# Patient Record
Sex: FEMALE | Race: WHITE | NOT HISPANIC OR LATINO | Employment: FULL TIME | URBAN - METROPOLITAN AREA
[De-identification: names, ages, dates, MRNs, and addresses within clinical notes are randomized per-mention and may not be internally consistent; named-entity substitution may affect disease eponyms.]

---

## 2017-08-08 ENCOUNTER — TELEPHONE (OUTPATIENT)
Dept: MEDICAL GROUP | Facility: PHYSICIAN GROUP | Age: 37
End: 2017-08-08

## 2017-08-08 NOTE — TELEPHONE ENCOUNTER
NEW PATIENT VISIT PRE-VISIT PLANNING    1.  [employer] Patient is checked in Patient Demographics? YES    2.  Immunizations were updated in [facility] using WebIZ?: Yes       •  Web Iz Recommendations: FLU and VARICELLA (Chicken Pox)     3.  Is this appointment scheduled as a Hospital Follow-Up? No    4.  Patient is due for the following Health Maintenance Topics:   There are no preventive care reminders to display for this patient.    - Patient declines Immunizations: Patient is up to date on all vaccines     5.  Reviewed/Updated the following with patient:       •   Preferred Pharmacy? YES       •   Preferred Lab? YES       •   Medications? YES. Was Abstract Encounter opened and chart updated? YES       •   Social History? YES. Was Abstract Encounter opened and chart updated? YES       •   Family History? YES. Was Abstract Encounter opened and chart updated? YES    6.  Updated Care Team?       •   DME Company (gait device, O2, CPAP, etc.) NO       •   Other Specialists (eye doctor, derm, GYN, cardiology, endo, etc): YES    7.  Patient was NOT informed to arrive 15 min prior to their scheduled appointment and bring in their medication bottles.

## 2017-08-09 ENCOUNTER — OFFICE VISIT (OUTPATIENT)
Dept: MEDICAL GROUP | Facility: PHYSICIAN GROUP | Age: 37
End: 2017-08-09
Payer: COMMERCIAL

## 2017-08-09 VITALS
BODY MASS INDEX: 36.7 KG/M2 | SYSTOLIC BLOOD PRESSURE: 128 MMHG | DIASTOLIC BLOOD PRESSURE: 76 MMHG | HEIGHT: 64 IN | WEIGHT: 214.95 LBS | TEMPERATURE: 97.6 F | OXYGEN SATURATION: 99 % | HEART RATE: 86 BPM

## 2017-08-09 DIAGNOSIS — D64.9 NORMOCYTIC ANEMIA: ICD-10-CM

## 2017-08-09 DIAGNOSIS — D72.829 LEUKOCYTOSIS, UNSPECIFIED TYPE: ICD-10-CM

## 2017-08-09 DIAGNOSIS — L98.9 SKIN LESION: ICD-10-CM

## 2017-08-09 DIAGNOSIS — I10 ESSENTIAL HYPERTENSION: ICD-10-CM

## 2017-08-09 DIAGNOSIS — E66.9 OBESITY (BMI 35.0-39.9 WITHOUT COMORBIDITY): ICD-10-CM

## 2017-08-09 PROCEDURE — 99204 OFFICE O/P NEW MOD 45 MIN: CPT | Performed by: INTERNAL MEDICINE

## 2017-08-09 ASSESSMENT — PATIENT HEALTH QUESTIONNAIRE - PHQ9: CLINICAL INTERPRETATION OF PHQ2 SCORE: 0

## 2017-08-09 NOTE — PROGRESS NOTES
Subjective:   Halle Savage is a 37 y.o. female here today for hypertension, lab work, referral for weight program improvement     Skin lesion  She has a growth, nodule on the back of the left ear, more of the skin rag, brown color. It has been for so many year, not changed. Get irritated sometimes.     Obesity (BMI 35.0-39.9 without comorbidity) ([facility])  Counseling for a small portion, balanced diet, exercising 3-5 times per week.      Normocytic anemia  Review of lab work 06/2016, showed normocytic anemia, post pregnancy. Need to be monitored. She denies extreme fatigue, shortness of breath, dizziness, being pale. She denies hematochezia, melanotic stool or abdominal pain. She still has regular period     Hypertension  Sometimes /80. She has been hypertensive during pregnancy. She used to be on medications during pregnancy. 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 She tries to eat healthy. She does not add salt     Elevated WBC count  From lab work 06/2016. She denies fever, unintentional weight loss, lymphadenopathy.         Current medicines (including changes today)  Current Outpatient Prescriptions   Medication Sig Dispense Refill   • Multiple Vitamins-Minerals (MULTIVITAMIN PO) Take  by mouth.     • Oxycodone-Acetaminophen (PERCOCET-10)  MG Tab Take 1 Tab by mouth every four hours as needed for Severe Pain ((Pain Scale 7-10)). 20 Tab 0   • ibuprofen (MOTRIN) 600 MG Tab Take 1 Tab by mouth every 6 hours as needed (Cramping). 30 Tab 3   • labetalol (NORMODYNE) 200 MG Tab Take 1 Tab by mouth 2 Times a Day. 60 Tab 3   • NIFEdipine SR (ADALAT CC) 60 MG CR tablet Take 1 Tab by mouth every day. 30 Tab 1   • docusate sodium 100 MG Cap Take 100 mg by mouth 2 times a day as needed for Constipation. 60 Cap 3   • Prenatal Vit-Fe Fumarate-FA (PRENATAL ONE DAILY PO) Take  by mouth.     • aspirin EC  (ECOTRIN) 81 MG Tablet Delayed Response Take 81 mg by mouth every day.     • azithromycin (ZITHROMAX) 250 MG Tab z-katalina; U.D. 6 Tab 1     No current facility-administered medications for this visit.     She  has a past medical history of Hypertension.    Current Outpatient Prescriptions   Medication Sig Dispense Refill   • Multiple Vitamins-Minerals (MULTIVITAMIN PO) Take  by mouth.     • Oxycodone-Acetaminophen (PERCOCET-10)  MG Tab Take 1 Tab by mouth every four hours as needed for Severe Pain ((Pain Scale 7-10)). 20 Tab 0   • ibuprofen (MOTRIN) 600 MG Tab Take 1 Tab by mouth every 6 hours as needed (Cramping). 30 Tab 3   • labetalol (NORMODYNE) 200 MG Tab Take 1 Tab by mouth 2 Times a Day. 60 Tab 3   • NIFEdipine SR (ADALAT CC) 60 MG CR tablet Take 1 Tab by mouth every day. 30 Tab 1   • docusate sodium 100 MG Cap Take 100 mg by mouth 2 times a day as needed for Constipation. 60 Cap 3   • Prenatal Vit-Fe Fumarate-FA (PRENATAL ONE DAILY PO) Take  by mouth.     • aspirin EC (ECOTRIN) 81 MG Tablet Delayed Response Take 81 mg by mouth every day.     • azithromycin (ZITHROMAX) 250 MG Tab z-katalina; U.D. 6 Tab 1     No current facility-administered medications for this visit.       Allergies as of 08/09/2017 - Pete as Reviewed 08/09/2017   Allergen Reaction Noted   • Augmentin Hives 07/13/2014       Social History     Social History   • Marital Status: Single     Spouse Name: N/A   • Number of Children: N/A   • Years of Education: N/A     Occupational History   • Not on file.     Social History Main Topics   • Smoking status: Never Smoker    • Smokeless tobacco: Never Used   • Alcohol Use: No   • Drug Use: No   • Sexual Activity: Yes      Comment: 3 boys     Other Topics Concern   • Not on file     Social History Narrative        Family History   Problem Relation Age of Onset   • Hypertension Mother    • Hyperlipidemia Mother    • Hypertension Father    • Hyperlipidemia Father    • Alcohol/Drug Father    • Cancer  "Father      kidney cancer   • Diabetes Neg Hx        Past Surgical History   Procedure Laterality Date   • Primary c section     • Repeat c section w tubal ligation  6/1/2016     Procedure: REPEAT C SECTION WITH TUBAL LIGATION;  Surgeon: Jana Shepherd M.D.;  Location: LABOR AND DELIVERY;  Service:    • Tonsillectomy     • Eye surgery       lasix   • Lymph node excision       right side of the neck   • Tube & ectopic preg., removal         ROS   All systems reviewed are negative except for HPI       Objective:     Blood pressure 128/76, pulse 86, temperature 36.4 °C (97.6 °F), height 1.613 m (5' 3.5\"), weight 97.5 kg (214 lb 15.2 oz), SpO2 99 %. Body mass index is 37.47 kg/(m^2).   Physical Exam:  Constitutional: Alert, no distress.  Skin: Warm, dry, good turgor, no rashes in visible areas.  Eye: Equal, round and reactive, conjunctiva clear, lids normal.  ENMT: Lips without lesions, good dentition, oropharynx clear.  Neck: Trachea midline, no masses, no thyromegaly. No cervical or supraclavicular lymphadenopathy  Respiratory: Unlabored respiratory effort, lungs clear to auscultation, no wheezes, no ronchi.  Cardiovascular: Normal S1, S2, no murmur, no edema.  Abdomen: Soft, non-tender, no masses, no hepatosplenomegaly.  Psych: Alert and oriented x3, normal affect and mood.  Muscleskeletal: leg straight test negative, hip range of motion intact. No tender point, no hip swelling or tender point         Assessment and Plan:   The following treatment plan was discussed    1. Obesity (BMI 35.0-39.9 without comorbidity) (HCC)  Counseling for a small portion, balanced diet, exercising 3-5 times per week.  Referral in place for weight loss program. TSH to evalute rule out hypothyroidism.     - REFERRAL TO [facility] HEALTH IMPROVEMENT PROGRAMS (HIP) Services Requested:: General-HIP Staff to Evaluate Best Program, Weight Management Program; Reason for Visit:: Overweight/Obesity  - TSH WITH REFLEX TO FT4; Future    2. " Essential hypertension  Continue to monitor 2-3 times per week, reevaluate in 4 weeks   Lab work to follow  - COMP METABOLIC PANEL; Future  - LIPID PROFILE; Future    3. Leukocytosis, unspecified type  Reevaluate, likely reactive at that time   - CBC WITH DIFFERENTIAL; Future    4. Normocytic anemia  Due to pregnancy. Continue to monitor   - CBC WITH DIFFERENTIAL; Future    5. Skin lesion  Skin tag, referral to derm for resection on monitoring   - REFERRAL TO DERMATOLOGY      Followup: Return in about 4 weeks (around 9/6/2017) for Short.

## 2017-08-09 NOTE — MR AVS SNAPSHOT
"        Halle Savage   2017 9:20 AM   Office Visit   MRN: [id number]    Department:  [facility]   Dept Phone:  [phone]    Description:  Female : 1980   Provider:  Slick Whitten M.D.           Reason for Visit     Hypertension High bp after pregnancy/would like to re-check       Allergies as of 2017     Allergen Noted Reactions    Augmentin 2014   Hives      You were diagnosed with     Obesity (BMI 35.0-39.9 without comorbidity) ([facility])   [630034]       Essential hypertension   [5049847]       Leukocytosis, unspecified type   [7231737]       Normocytic anemia   [683127]       Skin lesion   [524952]         Vital Signs     Blood Pressure Pulse Temperature Height Weight Body Mass Index    128/76 mmHg 86 36.4 °C (97.6 °F) 1.613 m (5' 3.5\") 97.5 kg (214 lb 15.2 oz) 37.47 kg/m2    Oxygen Saturation Smoking Status                99% Never Smoker           Basic Information     Date Of Birth Sex Race Ethnicity Preferred Language    1980 Female White Non- English      Your appointments     Sep 11, 2017  7:00 AM   Established Patient with Slick Whitten M.D.   [facility] (--)    [address]  [address]  [facility] [address] [phone]           You will be receiving a confirmation call a few days before your appointment from our automated call confirmation system.              Problem List              ICD-10-CM Priority Class Noted - Resolved    Hypertension I10   2015 - Present    Elevated WBC count D72.829   2015 - Present    Obesity (BMI 35.0-39.9 without comorbidity) (HCC) E66.9   2017 - Present    Normocytic anemia D64.9   2017 - Present    Skin lesion L98.9   2017 - Present      Health Maintenance        Date Due Completion Dates    IMM INFLUENZA (1) 2017 10/3/2016, 10/5/2015, 10/13/2014, 10/24/2013, 2012, 2011, 10/5/2010, 9/15/2009    PAP SMEAR 2017 (Done)    Override on 2014: Done (Dr. Jauregui per " patient)    IMM DTaP/Tdap/Td Vaccine (5 - Td) 5/31/2026 5/31/2016, 6/10/2011, 9/9/2009, 4/26/1994, 4/4/1984, 7/16/1982, 1980, 1979, 1980            Current Immunizations     DTP 7/16/1982, 1979, 1979, 1980    INFLUENZA VACCINE H1N1 10/22/2009    Influenza TIV (IM) 10/24/2013, 9/29/2012, 9/15/2009    Influenza Vac Subunit Quad Inj (Pf) 10/5/2010    Influenza Vaccine Quad Inj (Pf) 10/3/2016  8:16 AM, 10/5/2015  8:02 AM, 10/13/2014    Influenza Vaccine Quad Inj (Preserved) 9/29/2011    MMR Vaccine 6/10/2011, 9/9/2009, 8/26/1991, 7/16/1982    OPV - Historical Data 4/4/1984, 7/16/1982, 1979, 1979, 1979    TD Vaccine 4/26/1994    Tdap Vaccine 5/31/2016, 6/10/2011, 9/9/2009    Tuberculin Skin Test 5/19/2014 12:10 PM, 9/4/2013 12:20 PM, 9/10/2012 12:00 PM    dT Vaccine 4/4/1984      Below and/or attached are the medications your provider expects you to take. Review all of your home medications and newly ordered medications with your provider and/or pharmacist. Follow medication instructions as directed by your provider and/or pharmacist. Please keep your medication list with you and share with your provider. Update the information when medications are discontinued, doses are changed, or new medications (including over-the-counter products) are added; and carry medication information at all times in the event of emergency situations     Allergies:  AUGMENTIN - Hives               Medications  Valid as of: August 09, 2017 - 10:07 AM    Generic Name Brand Name Tablet Size Instructions for use    Aspirin (Tablet Delayed Response) ECOTRIN 81 MG Take 81 mg by mouth every day.        Azithromycin (Tab) ZITHROMAX 250 MG z-katalina; U.D.        Docusate Sodium (Cap)  MG Take 100 mg by mouth 2 times a day as needed for Constipation.        Ibuprofen (Tab) MOTRIN 600 MG Take 1 Tab by mouth every 6 hours as needed (Cramping).        Labetalol HCl (Tab) NORMODYNE 200 MG Take 1 Tab by  mouth 2 Times a Day.        Multiple Vitamins-Minerals   Take  by mouth.        NIFEdipine (TABLET SR 24 HR) ADALAT CC 60 MG Take 1 Tab by mouth every day.        Oxycodone-Acetaminophen (Tab) PERCOCET-10  MG Take 1 Tab by mouth every four hours as needed for Severe Pain ((Pain Scale 7-10)).        Prenatal Vit-Fe Fumarate-FA   Take  by mouth.        .                 Medicines prescribed today were sent to:     [facility] [address] [facility] [place] - [address] AT [facility] [address]    [address] [place] [address]    Phone: [phone] Fax: [phone]    Open 24 Hours?: No      Medication refill instructions:       If your prescription bottle indicates you have medication refills left, it is not necessary to call your provider’s office. Please contact your pharmacy and they will refill your medication.    If your prescription bottle indicates you do not have any refills left, you may request refills at any time through one of the following ways: The online "[employer] system (except Urgent Care), by calling your provider’s office, or by asking your pharmacy to contact your provider’s office with a refill request. Medication refills are processed only during regular business hours and may not be available until the next business day. Your provider may request additional information or to have a follow-up visit with you prior to refilling your medication.   *Please Note: Medication refills are assigned a new Rx number when refilled electronically. Your pharmacy may indicate that no refills were authorized even though a new prescription for the same medication is available at the pharmacy. Please request the medicine by name with the pharmacy before contacting your provider for a refill.        Your To Do List     Future Labs/Procedures Complete By Expires    CBC WITH DIFFERENTIAL  As directed 8/10/2018    COMP METABOLIC PANEL  As directed 8/10/2018    LIPID PROFILE  As directed  8/10/2018    TSH WITH REFLEX TO FT4  As directed 8/9/2018      Referral     A referral request has been sent to our patient care coordination department. Please allow 3-5 business days for us to process this request and contact you either by phone or mail. If you do not hear from us by the 5th business day, please call us at [phone].           [employer] Access Code: Activation code not generated  Current [employer] Status: Active

## 2017-08-09 NOTE — ASSESSMENT & PLAN NOTE
Review of lab work 06/2016, showed normocytic anemia, post pregnancy. Need to be monitored. She denies extreme fatigue, shortness of breath, dizziness, being pale. She denies hematochezia, melanotic stool or abdominal pain. She still has regular period

## 2017-08-09 NOTE — ASSESSMENT & PLAN NOTE
She has a growth, nodule on the back of the left ear, more of the skin rag, brown color. It has been for so many year, not changed. Get irritated sometimes.

## 2017-08-09 NOTE — ASSESSMENT & PLAN NOTE
Sometimes /80. She has been hypertensive during pregnancy. She used to be on medications during pregnancy. 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 She tries to eat healthy. She does not add salt

## 2017-08-24 ENCOUNTER — HOSPITAL ENCOUNTER (OUTPATIENT)
Dept: LAB | Facility: MEDICAL CENTER | Age: 37
End: 2017-08-24
Attending: INTERNAL MEDICINE
Payer: COMMERCIAL

## 2017-08-24 DIAGNOSIS — D64.9 NORMOCYTIC ANEMIA: ICD-10-CM

## 2017-08-24 DIAGNOSIS — D72.829 LEUKOCYTOSIS, UNSPECIFIED TYPE: ICD-10-CM

## 2017-08-24 DIAGNOSIS — E66.9 OBESITY (BMI 35.0-39.9 WITHOUT COMORBIDITY): ICD-10-CM

## 2017-08-24 DIAGNOSIS — I10 ESSENTIAL HYPERTENSION: ICD-10-CM

## 2017-08-24 LAB
ALBUMIN SERPL BCP-MCNC: 3.8 G/DL (ref 3.2–4.9)
ALBUMIN/GLOB SERPL: 1.1 G/DL
ALP SERPL-CCNC: 74 U/L (ref 30–99)
ALT SERPL-CCNC: 5 U/L (ref 2–50)
ANION GAP SERPL CALC-SCNC: 7 MMOL/L (ref 0–11.9)
AST SERPL-CCNC: 9 U/L (ref 12–45)
BASOPHILS # BLD AUTO: 1 % (ref 0–1.8)
BASOPHILS # BLD: 0.1 K/UL (ref 0–0.12)
BILIRUB SERPL-MCNC: 0.4 MG/DL (ref 0.1–1.5)
BUN SERPL-MCNC: 6 MG/DL (ref 8–22)
CALCIUM SERPL-MCNC: 9.2 MG/DL (ref 8.5–10.5)
CHLORIDE SERPL-SCNC: 106 MMOL/L (ref 96–112)
CHOLEST SERPL-MCNC: 169 MG/DL (ref 100–199)
CO2 SERPL-SCNC: 24 MMOL/L (ref 20–33)
CREAT SERPL-MCNC: 0.59 MG/DL (ref 0.5–1.4)
EOSINOPHIL # BLD AUTO: 0.11 K/UL (ref 0–0.51)
EOSINOPHIL NFR BLD: 1.1 % (ref 0–6.9)
ERYTHROCYTE [DISTWIDTH] IN BLOOD BY AUTOMATED COUNT: 44.5 FL (ref 35.9–50)
FASTING STATUS PATIENT QL REPORTED: NORMAL
GFR SERPL CREATININE-BSD FRML MDRD: >60 ML/MIN/1.73 M 2
GLOBULIN SER CALC-MCNC: 3.6 G/DL (ref 1.9–3.5)
GLUCOSE SERPL-MCNC: 79 MG/DL (ref 65–99)
HCT VFR BLD AUTO: 40.6 % (ref 37–47)
HDLC SERPL-MCNC: 40 MG/DL
HGB BLD-MCNC: 12.4 G/DL (ref 12–16)
IMM GRANULOCYTES # BLD AUTO: 0.02 K/UL (ref 0–0.11)
IMM GRANULOCYTES NFR BLD AUTO: 0.2 % (ref 0–0.9)
LDLC SERPL CALC-MCNC: 109 MG/DL
LYMPHOCYTES # BLD AUTO: 2.19 K/UL (ref 1–4.8)
LYMPHOCYTES NFR BLD: 21.9 % (ref 22–41)
MCH RBC QN AUTO: 26.8 PG (ref 27–33)
MCHC RBC AUTO-ENTMCNC: 30.5 G/DL (ref 33.6–35)
MCV RBC AUTO: 87.9 FL (ref 81.4–97.8)
MONOCYTES # BLD AUTO: 0.92 K/UL (ref 0–0.85)
MONOCYTES NFR BLD AUTO: 9.2 % (ref 0–13.4)
NEUTROPHILS # BLD AUTO: 6.68 K/UL (ref 2–7.15)
NEUTROPHILS NFR BLD: 66.6 % (ref 44–72)
NRBC # BLD AUTO: 0 K/UL
NRBC BLD AUTO-RTO: 0 /100 WBC
PLATELET # BLD AUTO: 339 K/UL (ref 164–446)
PMV BLD AUTO: 13.3 FL (ref 9–12.9)
POTASSIUM SERPL-SCNC: 3.9 MMOL/L (ref 3.6–5.5)
PROT SERPL-MCNC: 7.4 G/DL (ref 6–8.2)
RBC # BLD AUTO: 4.62 M/UL (ref 4.2–5.4)
SODIUM SERPL-SCNC: 137 MMOL/L (ref 135–145)
TRIGL SERPL-MCNC: 100 MG/DL (ref 0–149)
TSH SERPL DL<=0.005 MIU/L-ACNC: 1.78 UIU/ML (ref 0.3–3.7)
WBC # BLD AUTO: 10 K/UL (ref 4.8–10.8)

## 2017-08-24 PROCEDURE — 36415 COLL VENOUS BLD VENIPUNCTURE: CPT

## 2017-08-24 PROCEDURE — 84443 ASSAY THYROID STIM HORMONE: CPT

## 2017-08-24 PROCEDURE — 80053 COMPREHEN METABOLIC PANEL: CPT

## 2017-08-24 PROCEDURE — 80061 LIPID PANEL: CPT

## 2017-08-24 PROCEDURE — 85025 COMPLETE CBC W/AUTO DIFF WBC: CPT

## 2017-09-11 ENCOUNTER — OFFICE VISIT (OUTPATIENT)
Dept: MEDICAL GROUP | Facility: PHYSICIAN GROUP | Age: 37
End: 2017-09-11
Payer: COMMERCIAL

## 2017-09-11 VITALS
OXYGEN SATURATION: 97 % | SYSTOLIC BLOOD PRESSURE: 128 MMHG | HEART RATE: 80 BPM | RESPIRATION RATE: 16 BRPM | DIASTOLIC BLOOD PRESSURE: 80 MMHG | TEMPERATURE: 97.6 F | HEIGHT: 64 IN | WEIGHT: 215 LBS | BODY MASS INDEX: 36.7 KG/M2

## 2017-09-11 DIAGNOSIS — Z23 NEED FOR VACCINATION: ICD-10-CM

## 2017-09-11 DIAGNOSIS — E66.9 OBESITY (BMI 35.0-39.9 WITHOUT COMORBIDITY): ICD-10-CM

## 2017-09-11 DIAGNOSIS — I10 ESSENTIAL HYPERTENSION: ICD-10-CM

## 2017-09-11 DIAGNOSIS — L98.9 SKIN LESION: ICD-10-CM

## 2017-09-11 PROBLEM — D64.9 NORMOCYTIC ANEMIA: Status: RESOLVED | Noted: 2017-08-09 | Resolved: 2017-09-11

## 2017-09-11 PROCEDURE — 90686 IIV4 VACC NO PRSV 0.5 ML IM: CPT | Performed by: INTERNAL MEDICINE

## 2017-09-11 PROCEDURE — 90471 IMMUNIZATION ADMIN: CPT | Performed by: INTERNAL MEDICINE

## 2017-09-11 PROCEDURE — 99214 OFFICE O/P EST MOD 30 MIN: CPT | Mod: 25 | Performed by: INTERNAL MEDICINE

## 2017-09-11 RX ORDER — LISINOPRIL 10 MG/1
10 TABLET ORAL DAILY
Qty: 30 TAB | Refills: 3 | Status: SHIPPED | OUTPATIENT
Start: 2017-09-11 | End: 2018-03-08 | Stop reason: SDUPTHER

## 2017-09-11 ASSESSMENT — PAIN SCALES - GENERAL: PAINLEVEL: NO PAIN

## 2017-09-11 NOTE — ASSESSMENT & PLAN NOTE
She tells me most the time BP well controled at home. It is usually around 130/80. 30 % of the time will be 146/85. She denies headache, chest pain, palpitations, leg swelling. 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 She does not use salt in excessive. She has had tubal ligation, no plan for other pregnancy.

## 2017-09-11 NOTE — PROGRESS NOTES
Subjective:   Halle Savage is a 37 y.o. female here today for lab work review, hypertension     Hypertension  She tells me most the time BP well controled at home. It is usually around 130/80. 30 % of the time will be 146/85. She denies headache, chest pain, palpitations, leg swelling. 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 She does not use salt in excessive. She has had tubal ligation, no plan for other pregnancy.     Obesity (BMI 35.0-39.9 without comorbidity) ([facility])  Counseling for a small portion, balanced diet, exercising 3-5 times per week. She is planning to participate at  program for weight loss. Not very active due to job, children. Sometimes does not eat very healthy       Skin lesion  She has upcoming apt with derm, this week.     Lab work review. Anemia resolved, leukocytosis resolved. TSH nl     Current medicines (including changes today)  Current Outpatient Prescriptions   Medication Sig Dispense Refill   • lisinopril (PRINIVIL) 10 MG Tab Take 1 Tab by mouth every day. 30 Tab 3   • Multiple Vitamins-Minerals (MULTIVITAMIN PO) Take  by mouth.       No current facility-administered medications for this visit.      She  has a past medical history of Hypertension.    Current Outpatient Prescriptions   Medication Sig Dispense Refill   • lisinopril (PRINIVIL) 10 MG Tab Take 1 Tab by mouth every day. 30 Tab 3   • Multiple Vitamins-Minerals (MULTIVITAMIN PO) Take  by mouth.       No current facility-administered medications for this visit.        Allergies as of 09/11/2017 - Reviewed 09/11/2017   Allergen Reaction Noted   • Augmentin Hives 07/13/2014       Social History     Social History   • Marital status: Single     Spouse name: N/A   • Number of children: N/A   • Years of education: N/A     Occupational History   • Not on file.     Social History Main Topics   • Smoking status: Never Smoker   • Smokeless tobacco: Never Used   • Alcohol use No   • Drug  "use: No   • Sexual activity: Yes      Comment: 3 boys     Other Topics Concern   • Not on file     Social History Narrative   • No narrative on file        Family History   Problem Relation Age of Onset   • Hypertension Mother    • Hyperlipidemia Mother    • Hypertension Father    • Hyperlipidemia Father    • Alcohol/Drug Father    • Cancer Father      kidney cancer   • Diabetes Neg Hx        Past Surgical History:   Procedure Laterality Date   • REPEAT C SECTION W TUBAL LIGATION  6/1/2016    Procedure: REPEAT C SECTION WITH TUBAL LIGATION;  Surgeon: Jana Shepherd M.D.;  Location: LABOR AND DELIVERY;  Service:    • EYE SURGERY      lasix   • LYMPH NODE EXCISION      right side of the neck   • PRIMARY C SECTION     • TONSILLECTOMY     • TUBE & ECTOPIC PREG., REMOVAL         ROS   All systems reviewed are negative except for HPI         Objective:     Blood pressure 128/80, pulse 80, temperature 36.4 °C (97.6 °F), resp. rate 16, height 1.613 m (5' 3.5\"), weight 97.5 kg (215 lb), last menstrual period 09/01/2017, SpO2 97 %, not currently breastfeeding. Body mass index is 37.48 kg/m².   Physical Exam:  Constitutional: Alert, no distress.  Skin: Warm, dry, good turgor, no rashes in visible areas.  Eye: Equal, round and reactive, conjunctiva clear, lids normal.  ENMT: Lips without lesions, good dentition, oropharynx clear.  Neck: Trachea midline, no masses, no thyromegaly. No cervical or supraclavicular lymphadenopathy  Respiratory: Unlabored respiratory effort, lungs clear to auscultation, no wheezes, no ronchi.  Cardiovascular: Normal S1, S2, no murmur, no edema.  Abdomen: Soft, non-tender, no masses, no hepatosplenomegaly.  Psych: Alert and oriented x3, normal affect and mood.        Assessment and Plan:   The following treatment plan was discussed    1. Essential hypertension  Discuss with pt treatment option. Because of her age, she will benefits for treatment of low dose of lisinopril 10 mg. Discuss with her, " side effects as dry cough, pt agrees with the plan   - lisinopril (PRINIVIL) 10 MG Tab; Take 1 Tab by mouth every day.  Dispense: 30 Tab; Refill: 3  - COMP METABOLIC PANEL; Future  - MICROALBUMIN CREAT RATIO URINE; Future    2. Skin lesion  Follow up with derm     3. Obesity (BMI 35.0-39.9 without comorbidity) (HCC)  Counseling for a small portion, balanced diet, exercising 3-5 times per week.    4. Need for vaccination  Flu shot ordered   - INFLUENZA VACCINE QUAD INJ >3Y(PF)      Followup: Return in about 2 months (around 11/11/2017).

## 2017-09-11 NOTE — ASSESSMENT & PLAN NOTE
Counseling for a small portion, balanced diet, exercising 3-5 times per week. She is planning to participate at  program for weight loss. Not very active due to job, children. Sometimes does not eat very healthy

## 2017-09-27 ENCOUNTER — HOSPITAL ENCOUNTER (OUTPATIENT)
Dept: LAB | Facility: MEDICAL CENTER | Age: 37
End: 2017-09-27
Payer: COMMERCIAL

## 2017-09-27 LAB
BDY FAT % MEASURED: 43.7 %
BP DIAS: 69 MMHG
BP SYS: 115 MMHG
CHOLEST SERPL-MCNC: 172 MG/DL (ref 100–199)
DIABETES HTDIA: NO
EVENT NAME HTEVT: NORMAL
FASTING HTFAS: YES
GLUCOSE SERPL-MCNC: 76 MG/DL (ref 65–99)
HDLC SERPL-MCNC: 41 MG/DL
HYPERTENSION HTHYP: YES
LDLC SERPL CALC-MCNC: 112 MG/DL
SCREENING LOC CITY HTCIT: NORMAL
SCREENING LOC STATE HTSTA: NORMAL
SCREENING LOCATION HTLOC: NORMAL
SMOKING HTSMO: NO
SUBSCRIBER ID HTSID: NORMAL
TRIGL SERPL-MCNC: 94 MG/DL (ref 0–149)

## 2017-09-27 PROCEDURE — 82947 ASSAY GLUCOSE BLOOD QUANT: CPT

## 2017-09-27 PROCEDURE — S5190 WELLNESS ASSESSMENT BY NONPH: HCPCS

## 2017-09-27 PROCEDURE — 80061 LIPID PANEL: CPT

## 2017-09-27 PROCEDURE — 36415 COLL VENOUS BLD VENIPUNCTURE: CPT

## 2017-09-28 ENCOUNTER — EH NON-PROVIDER (OUTPATIENT)
Dept: OCCUPATIONAL MEDICINE | Facility: CLINIC | Age: 37
End: 2017-09-28

## 2017-09-28 ENCOUNTER — TELEPHONE (OUTPATIENT)
Dept: MEDICAL GROUP | Facility: PHYSICIAN GROUP | Age: 37
End: 2017-09-28

## 2017-09-28 DIAGNOSIS — Z29.89 NEED FOR ISOLATION: ICD-10-CM

## 2017-09-28 PROCEDURE — 94375 RESPIRATORY FLOW VOLUME LOOP: CPT

## 2017-09-28 NOTE — TELEPHONE ENCOUNTER
----- Message from Slick Whitten M.D. sent at 9/28/2017  7:28 AM PDT -----  Hello,  Blood pressure, fasting glucose are normal  Cholesterol is slight elevated, but not bad. Try to avoid fat food, and try to exercise 3-5 times per week  Best,  Slick Whitten M.D.

## 2017-11-11 ENCOUNTER — HOSPITAL ENCOUNTER (OUTPATIENT)
Dept: LAB | Facility: MEDICAL CENTER | Age: 37
End: 2017-11-11
Attending: INTERNAL MEDICINE
Payer: COMMERCIAL

## 2017-11-11 DIAGNOSIS — I10 ESSENTIAL HYPERTENSION: ICD-10-CM

## 2017-11-11 LAB
ALBUMIN SERPL BCP-MCNC: 4 G/DL (ref 3.2–4.9)
ALBUMIN/GLOB SERPL: 1.1 G/DL
ALP SERPL-CCNC: 60 U/L (ref 30–99)
ALT SERPL-CCNC: 7 U/L (ref 2–50)
ANION GAP SERPL CALC-SCNC: 6 MMOL/L (ref 0–11.9)
AST SERPL-CCNC: 12 U/L (ref 12–45)
BILIRUB SERPL-MCNC: 0.4 MG/DL (ref 0.1–1.5)
BUN SERPL-MCNC: 10 MG/DL (ref 8–22)
CALCIUM SERPL-MCNC: 9.2 MG/DL (ref 8.5–10.5)
CHLORIDE SERPL-SCNC: 104 MMOL/L (ref 96–112)
CO2 SERPL-SCNC: 24 MMOL/L (ref 20–33)
CREAT SERPL-MCNC: 0.68 MG/DL (ref 0.5–1.4)
CREAT UR-MCNC: 147.5 MG/DL
GFR SERPL CREATININE-BSD FRML MDRD: >60 ML/MIN/1.73 M 2
GLOBULIN SER CALC-MCNC: 3.6 G/DL (ref 1.9–3.5)
GLUCOSE SERPL-MCNC: 79 MG/DL (ref 65–99)
MICROALBUMIN UR-MCNC: <0.7 MG/DL
MICROALBUMIN/CREAT UR: NORMAL MG/G (ref 0–30)
POTASSIUM SERPL-SCNC: 4.1 MMOL/L (ref 3.6–5.5)
PROT SERPL-MCNC: 7.6 G/DL (ref 6–8.2)
SODIUM SERPL-SCNC: 134 MMOL/L (ref 135–145)

## 2017-11-11 PROCEDURE — 82043 UR ALBUMIN QUANTITATIVE: CPT

## 2017-11-11 PROCEDURE — 36415 COLL VENOUS BLD VENIPUNCTURE: CPT

## 2017-11-11 PROCEDURE — 80053 COMPREHEN METABOLIC PANEL: CPT

## 2017-11-11 PROCEDURE — 82570 ASSAY OF URINE CREATININE: CPT

## 2017-11-14 ENCOUNTER — OFFICE VISIT (OUTPATIENT)
Dept: MEDICAL GROUP | Facility: PHYSICIAN GROUP | Age: 37
End: 2017-11-14
Payer: COMMERCIAL

## 2017-11-14 VITALS
BODY MASS INDEX: 34.83 KG/M2 | SYSTOLIC BLOOD PRESSURE: 124 MMHG | TEMPERATURE: 98.5 F | WEIGHT: 204 LBS | RESPIRATION RATE: 16 BRPM | HEART RATE: 75 BPM | DIASTOLIC BLOOD PRESSURE: 76 MMHG | OXYGEN SATURATION: 99 % | HEIGHT: 64 IN

## 2017-11-14 DIAGNOSIS — N94.3 PREMENSTRUAL SYNDROME: ICD-10-CM

## 2017-11-14 DIAGNOSIS — I10 ESSENTIAL HYPERTENSION: ICD-10-CM

## 2017-11-14 DIAGNOSIS — E66.9 OBESITY (BMI 35.0-39.9 WITHOUT COMORBIDITY): ICD-10-CM

## 2017-11-14 DIAGNOSIS — L98.9 SKIN LESION: ICD-10-CM

## 2017-11-14 DIAGNOSIS — N64.4 BREAST TENDERNESS: ICD-10-CM

## 2017-11-14 PROCEDURE — 99214 OFFICE O/P EST MOD 30 MIN: CPT | Performed by: INTERNAL MEDICINE

## 2017-11-14 RX ORDER — DROSPIRENONE AND ETHINYL ESTRADIOL 0.03MG-3MG
1 KIT ORAL DAILY
Qty: 28 TAB | Refills: 11 | Status: SHIPPED | OUTPATIENT
Start: 2017-11-14 | End: 2018-11-21

## 2017-11-14 NOTE — ASSESSMENT & PLAN NOTE
Since she's been on lisinopril 10 mg daily, blood pressure is better controlled. She denies headache, chest pain, palpitation, lightheadedness,, leg swelling. She denies side effects from lisinopril

## 2017-11-14 NOTE — ASSESSMENT & PLAN NOTE
She has lost 10 lbs in two months. She continued healthy, exercise. Blood pressure better controlled.Counseling for a small portion, balanced diet, exercising 3-5 times per week.

## 2017-11-14 NOTE — ASSESSMENT & PLAN NOTE
She reports bilateral breast tenderness before her menstrual cycle. Breast exam done today, and I don't feel nodule, or lymphadenopathy. She reports being nervous before her menstrual cycle, her menstrual cycle is getting heavier. She reports being more emotional lately.

## 2017-11-14 NOTE — PROGRESS NOTES
Subjective:   Halle Savage is a 37 y.o. female here today for breast tenderness, hypertension     Obesity (BMI 35.0-39.9 without comorbidity) (HCC)  She has lost 10 lbs in two months. She continued healthy, exercise. Blood pressure better controlled.Counseling for a small portion, balanced diet, exercising 3-5 times per week.      Hypertension  Since she's been on lisinopril 10 mg daily, blood pressure is better controlled. She denies headache, chest pain, palpitation, lightheadedness,, leg swelling. She denies side effects from lisinopril    Breast tenderness  She reports bilateral breast tenderness before her menstrual cycle. Breast exam done today, and I don't feel nodule, or lymphadenopathy. She reports being nervous before her menstrual cycle, her menstrual cycle is getting heavier. She reports being more emotional lately.     Skin lesion  She has up coming apt with dermatology. No changes     Current medicines (including changes today)  Current Outpatient Prescriptions   Medication Sig Dispense Refill   • drospirenone-ethinyl estradiol (ROSENDA) 3-0.03 MG per tablet Take 1 Tab by mouth every day. 28 Tab 11   • lisinopril (PRINIVIL) 10 MG Tab Take 1 Tab by mouth every day. 30 Tab 3   • Multiple Vitamins-Minerals (MULTIVITAMIN PO) Take  by mouth.       No current facility-administered medications for this visit.      She  has a past medical history of Hypertension.    Current Outpatient Prescriptions   Medication Sig Dispense Refill   • drospirenone-ethinyl estradiol (ROSENDA) 3-0.03 MG per tablet Take 1 Tab by mouth every day. 28 Tab 11   • lisinopril (PRINIVIL) 10 MG Tab Take 1 Tab by mouth every day. 30 Tab 3   • Multiple Vitamins-Minerals (MULTIVITAMIN PO) Take  by mouth.       No current facility-administered medications for this visit.        Allergies as of 11/14/2017 - Reviewed 11/14/2017   Allergen Reaction Noted   • Augmentin Hives 07/13/2014       Social History     Social History   •  "Marital status: Single     Spouse name: N/A   • Number of children: N/A   • Years of education: N/A     Occupational History   • Not on file.     Social History Main Topics   • Smoking status: Never Smoker   • Smokeless tobacco: Never Used   • Alcohol use No   • Drug use: No   • Sexual activity: Yes      Comment: 3 boys     Other Topics Concern   • Not on file     Social History Narrative   • No narrative on file        Family History   Problem Relation Age of Onset   • Hypertension Mother    • Hyperlipidemia Mother    • Hypertension Father    • Hyperlipidemia Father    • Alcohol/Drug Father    • Cancer Father      kidney cancer   • Diabetes Neg Hx        Past Surgical History:   Procedure Laterality Date   • REPEAT C SECTION W TUBAL LIGATION  6/1/2016    Procedure: REPEAT C SECTION WITH TUBAL LIGATION;  Surgeon: Jana Shepherd M.D.;  Location: LABOR AND DELIVERY;  Service:    • EYE SURGERY      lasix   • LYMPH NODE EXCISION      right side of the neck   • PRIMARY C SECTION     • TONSILLECTOMY     • TUBE & ECTOPIC PREG., REMOVAL         ROS   All systems reviewed are negative except for HPI       Objective:     Blood pressure 124/76, pulse 75, temperature 36.9 °C (98.5 °F), resp. rate 16, height 1.613 m (5' 3.5\"), weight 92.5 kg (204 lb), last menstrual period 10/21/2017, SpO2 99 %, not currently breastfeeding. Body mass index is 35.57 kg/m².   Physical Exam:  Constitutional: Alert, no distress.  Skin: Warm, dry, good turgor, no rashes in visible areas.  Eye: Equal, round and reactive, conjunctiva clear, lids normal.  ENMT: Lips without lesions, good dentition, oropharynx clear.  Neck: Trachea midline, no masses, no thyromegaly. No cervical or supraclavicular lymphadenopathy  Breast: right always larger then the left, no nipple retraction, no lymphadenopathy, not able to palpate any nodule on both breast   Respiratory: Unlabored respiratory effort, lungs clear to auscultation, no wheezes, no " jessica.  Cardiovascular: Normal S1, S2, no murmur, no edema.  Abdomen: Soft, non-tender, no masses, no hepatosplenomegaly.  Psych: Alert and oriented x3, normal affect and mood.        Assessment and Plan:   The following treatment plan was discussed    1. Essential hypertension  Continue same treatment. Continue to monitor  - LIPID PROFILE; Future  - COMP METABOLIC PANEL; Future  - CBC WITH DIFFERENTIAL; Future    2. Obesity (BMI 35.0-39.9 without comorbidity)  Counseling for a small portion, balanced diet, exercising 3-5 times per week.    - LIPID PROFILE; Future  - COMP METABOLIC PANEL; Future  - CBC WITH DIFFERENTIAL; Future    3. Skin lesion  Follow up with dermatology     4. Breast tenderness  5. Premenstrual syndrome  Likely due to premenstrual syndrome. Pt agreed to restart oral contraceptive. I discussed IUD, if increase of blood pressure. No further test at this time. Lab work reviewed and all normal       Followup: Return in about 1 year (around 11/14/2018), or if symptoms worsen or fail to improve, for Short.

## 2018-01-25 ENCOUNTER — HOSPITAL ENCOUNTER (OUTPATIENT)
Facility: MEDICAL CENTER | Age: 38
End: 2018-01-25
Payer: COMMERCIAL

## 2018-01-25 LAB
EST. AVERAGE GLUCOSE BLD GHB EST-MCNC: 114 MG/DL
HBA1C MFR BLD: 5.6 % (ref 0–5.6)

## 2018-01-26 LAB
CHOLEST SERPL-MCNC: 204 MG/DL (ref 100–199)
GLUCOSE SERPL-MCNC: 89 MG/DL (ref 65–99)
HDLC SERPL-MCNC: 56 MG/DL
LDLC SERPL CALC-MCNC: 113 MG/DL
TRIGL SERPL-MCNC: 175 MG/DL (ref 0–149)

## 2018-01-27 LAB — LPA SERPL-MCNC: 143 MG/DL

## 2018-01-31 LAB
CHOLEST SERPL-MCNC: 209 MG/DL
HDL PARTICAL NO Q4363: 38.7 UMOL/L
HDL SIZE Q4361: 8.8 NM
HDLC SERPL-MCNC: 54 MG/DL (ref 40–59)
HLD.LARGE SERPL-SCNC: 4.7 UMOL/L
L VLDL PART NO Q4357: 3.2 NMOL/L
LDL SERPL QN: 20.5 NM
LDL SERPL-SCNC: 2008 NMOL/L
LDL SMALL SERPL-SCNC: >1085 NMOL/L
LDLC SERPL CALC-MCNC: 117 MG/DL
PATHOLOGY STUDY: ABNORMAL
TRIGL SERPL-MCNC: 191 MG/DL (ref 30–149)
VLDL SIZE Q4362: 48.4 NM

## 2018-03-08 DIAGNOSIS — I10 ESSENTIAL HYPERTENSION: ICD-10-CM

## 2018-03-09 RX ORDER — LISINOPRIL 10 MG/1
TABLET ORAL
Qty: 90 TAB | Refills: 3 | Status: SHIPPED | OUTPATIENT
Start: 2018-03-09 | End: 2018-05-16

## 2018-03-28 ENCOUNTER — OFFICE VISIT (OUTPATIENT)
Dept: MEDICAL GROUP | Facility: CLINIC | Age: 38
End: 2018-03-28
Payer: COMMERCIAL

## 2018-03-28 ENCOUNTER — HOSPITAL ENCOUNTER (OUTPATIENT)
Dept: LAB | Facility: MEDICAL CENTER | Age: 38
End: 2018-03-28
Attending: FAMILY MEDICINE
Payer: COMMERCIAL

## 2018-03-28 VITALS
WEIGHT: 188 LBS | HEART RATE: 76 BPM | HEIGHT: 64 IN | RESPIRATION RATE: 14 BRPM | SYSTOLIC BLOOD PRESSURE: 118 MMHG | OXYGEN SATURATION: 99 % | DIASTOLIC BLOOD PRESSURE: 72 MMHG | TEMPERATURE: 97.2 F | BODY MASS INDEX: 32.1 KG/M2

## 2018-03-28 DIAGNOSIS — R19.7 DIARRHEA, UNSPECIFIED TYPE: ICD-10-CM

## 2018-03-28 DIAGNOSIS — R11.0 NAUSEA: ICD-10-CM

## 2018-03-28 LAB
ALBUMIN SERPL BCP-MCNC: 3.8 G/DL (ref 3.2–4.9)
ALBUMIN/GLOB SERPL: 1.1 G/DL
ALP SERPL-CCNC: 60 U/L (ref 30–99)
ALT SERPL-CCNC: 24 U/L (ref 2–50)
ANION GAP SERPL CALC-SCNC: 8 MMOL/L (ref 0–11.9)
AST SERPL-CCNC: 25 U/L (ref 12–45)
BASOPHILS # BLD AUTO: 0.9 % (ref 0–1.8)
BASOPHILS # BLD: 0.09 K/UL (ref 0–0.12)
BILIRUB SERPL-MCNC: 0.3 MG/DL (ref 0.1–1.5)
BUN SERPL-MCNC: 9 MG/DL (ref 8–22)
CALCIUM SERPL-MCNC: 8.5 MG/DL (ref 8.5–10.5)
CHLORIDE SERPL-SCNC: 106 MMOL/L (ref 96–112)
CO2 SERPL-SCNC: 23 MMOL/L (ref 20–33)
CREAT SERPL-MCNC: 0.66 MG/DL (ref 0.5–1.4)
EOSINOPHIL # BLD AUTO: 0 K/UL (ref 0–0.51)
EOSINOPHIL NFR BLD: 0 % (ref 0–6.9)
ERYTHROCYTE [DISTWIDTH] IN BLOOD BY AUTOMATED COUNT: 45.1 FL (ref 35.9–50)
GLOBULIN SER CALC-MCNC: 3.4 G/DL (ref 1.9–3.5)
GLUCOSE SERPL-MCNC: 81 MG/DL (ref 65–99)
HCT VFR BLD AUTO: 41.8 % (ref 37–47)
HGB BLD-MCNC: 13 G/DL (ref 12–16)
LYMPHOCYTES # BLD AUTO: 3.23 K/UL (ref 1–4.8)
LYMPHOCYTES NFR BLD: 33 % (ref 22–41)
MANUAL DIFF BLD: NORMAL
MCH RBC QN AUTO: 27.5 PG (ref 27–33)
MCHC RBC AUTO-ENTMCNC: 31.1 G/DL (ref 33.6–35)
MCV RBC AUTO: 88.6 FL (ref 81.4–97.8)
MONOCYTES # BLD AUTO: 0.6 K/UL (ref 0–0.85)
MONOCYTES NFR BLD AUTO: 6.1 % (ref 0–13.4)
MORPHOLOGY BLD-IMP: NORMAL
NEUTROPHILS # BLD AUTO: 5.88 K/UL (ref 2–7.15)
NEUTROPHILS NFR BLD: 60 % (ref 44–72)
NRBC # BLD AUTO: 0 K/UL
NRBC BLD-RTO: 0 /100 WBC
PLATELET # BLD AUTO: 359 K/UL (ref 164–446)
PLATELET BLD QL SMEAR: NORMAL
PMV BLD AUTO: 13.5 FL (ref 9–12.9)
POTASSIUM SERPL-SCNC: 3.8 MMOL/L (ref 3.6–5.5)
PROT SERPL-MCNC: 7.2 G/DL (ref 6–8.2)
RBC # BLD AUTO: 4.72 M/UL (ref 4.2–5.4)
RBC BLD AUTO: PRESENT
SODIUM SERPL-SCNC: 137 MMOL/L (ref 135–145)
VARIANT LYMPHS BLD QL SMEAR: NORMAL
WBC # BLD AUTO: 9.8 K/UL (ref 4.8–10.8)

## 2018-03-28 PROCEDURE — 85027 COMPLETE CBC AUTOMATED: CPT

## 2018-03-28 PROCEDURE — 36415 COLL VENOUS BLD VENIPUNCTURE: CPT

## 2018-03-28 PROCEDURE — 85007 BL SMEAR W/DIFF WBC COUNT: CPT

## 2018-03-28 PROCEDURE — 99213 OFFICE O/P EST LOW 20 MIN: CPT | Performed by: FAMILY MEDICINE

## 2018-03-28 PROCEDURE — 80053 COMPREHEN METABOLIC PANEL: CPT

## 2018-03-28 NOTE — PROGRESS NOTES
"CC: Nausea and diarrhea    HPI:   Halle is a 37-year-old white female who presents today with the following.    5 days ago she started having nausea. The following day she had one episode of vomiting. She felt so bad that she stated that all day on Sunday which was 3 days ago. Her appetite is decreased. Yesterday she had one episode of watery diarrhea and today she has had about 4 or 5 episodes of watery diarrhea. Denies any blood in stool. She is having some general abdominal discomfort but no specific pain. She has had a bilateral tubal ligation and is also taking birth control pills. She has not missed a period.  She denies fever or chills. She denies head or chest congestion. Denies shortness of breath, dizziness. Denies change in bladder habits.      Patient Active Problem List    Diagnosis Date Noted   • Breast tenderness 11/14/2017   • Premenstrual syndrome 11/14/2017   • Obesity (BMI 35.0-39.9 without comorbidity) 08/09/2017   • Skin lesion 08/09/2017   • Hypertension 01/23/2015       Current Outpatient Prescriptions   Medication Sig Dispense Refill   • lisinopril (PRINIVIL) 10 MG Tab TAKE 1 TABLET BY MOUTH EVERY DAY 90 Tab 3   • drospirenone-ethinyl estradiol (ROSENDA) 3-0.03 MG per tablet Take 1 Tab by mouth every day. 28 Tab 11   • Multiple Vitamins-Minerals (MULTIVITAMIN PO) Take  by mouth.       No current facility-administered medications for this visit.          Allergies as of 03/28/2018 - Reviewed 03/28/2018   Allergen Reaction Noted   • Augmentin Hives 07/13/2014        ROS: As per HPI.    /72   Pulse 76   Temp 36.2 °C (97.2 °F)   Resp 14   Ht 1.613 m (5' 3.5\")   Wt 85.3 kg (188 lb)   LMP 02/28/2018   SpO2 99%   Breastfeeding? No   BMI 32.78 kg/m²     Physical Exam:  Gen:         Alert and oriented, No apparent distress.  HEENT:    PERRLA, EOMI, oropharynx clear without exudates, TMs clear bilaterally.  Neck:        No Lymphadenopathy   Lungs:     Clear to auscultation bilaterally  CV: "          Regular rate and rhythm. No murmurs, rubs or gallops.  Abd:         Soft, scant tenderness to palpation in the right upper quadrant and midepigastrium but otherwise non tender, non distended, no guarding or rebound. Normal active bowel sounds.  No  Hepatosplenomegaly, No pulsatile masses.                   Ext:          No clubbing, cyanosis, edema.      Assessment and Plan.   37 y.o. female with the following issues.    1. Nausea for 5 days with diarrhea for 2 days    - CBC WITH DIFFERENTIAL; Future  - COMP METABOLIC PANEL; Future  -Clear liquids for 24-36 hours then advance diet as tolerated.      Follow-up pending results. If symptoms not resolving, will get stool studies, consider gallbladder ultrasound.

## 2018-04-19 DIAGNOSIS — I10 ESSENTIAL HYPERTENSION: ICD-10-CM

## 2018-04-19 RX ORDER — METOPROLOL SUCCINATE 25 MG/1
25 TABLET, EXTENDED RELEASE ORAL DAILY
Qty: 30 TAB | Refills: 0 | Status: SHIPPED | OUTPATIENT
Start: 2018-04-19 | End: 2018-05-16

## 2018-05-03 ENCOUNTER — HOSPITAL ENCOUNTER (OUTPATIENT)
Facility: MEDICAL CENTER | Age: 38
End: 2018-05-03
Attending: FAMILY MEDICINE
Payer: COMMERCIAL

## 2018-05-03 LAB
EST. AVERAGE GLUCOSE BLD GHB EST-MCNC: 114 MG/DL
GLUCOSE SERPL-MCNC: 88 MG/DL (ref 65–99)
HBA1C MFR BLD: 5.6 % (ref 0–5.6)

## 2018-05-05 LAB — LPA SERPL-MCNC: 118 MG/DL

## 2018-05-10 LAB
CHOLEST SERPL-MCNC: 202 MG/DL
HDL PARTICAL NO Q4363: 33.9 UMOL/L
HDL SIZE Q4361: 9.1 NM
HDLC SERPL-MCNC: 53 MG/DL (ref 40–59)
HLD.LARGE SERPL-SCNC: 6.9 UMOL/L
L VLDL PART NO Q4357: 1.8 NMOL/L
LDL SERPL QN: 21 NM
LDL SERPL-SCNC: 1711 NMOL/L
LDL SMALL SERPL-SCNC: 712 NMOL/L
LDLC SERPL CALC-MCNC: 120 MG/DL
PATHOLOGY STUDY: ABNORMAL
TRIGL SERPL-MCNC: 145 MG/DL (ref 30–149)
VLDL SIZE Q4362: 44.6 NM

## 2018-05-14 ENCOUNTER — HOSPITAL ENCOUNTER (OUTPATIENT)
Dept: LAB | Facility: MEDICAL CENTER | Age: 38
End: 2018-05-14
Attending: INTERNAL MEDICINE
Payer: COMMERCIAL

## 2018-05-14 DIAGNOSIS — E66.9 OBESITY (BMI 35.0-39.9 WITHOUT COMORBIDITY): ICD-10-CM

## 2018-05-14 DIAGNOSIS — I10 ESSENTIAL HYPERTENSION: ICD-10-CM

## 2018-05-14 LAB
ALBUMIN SERPL BCP-MCNC: 3.1 G/DL (ref 3.2–4.9)
ALBUMIN/GLOB SERPL: 0.9 G/DL
ALP SERPL-CCNC: 44 U/L (ref 30–99)
ALT SERPL-CCNC: <5 U/L (ref 2–50)
ANION GAP SERPL CALC-SCNC: 6 MMOL/L (ref 0–11.9)
AST SERPL-CCNC: 9 U/L (ref 12–45)
BASOPHILS # BLD AUTO: 0.9 % (ref 0–1.8)
BASOPHILS # BLD: 0.1 K/UL (ref 0–0.12)
BILIRUB SERPL-MCNC: 0.2 MG/DL (ref 0.1–1.5)
BUN SERPL-MCNC: 10 MG/DL (ref 8–22)
CALCIUM SERPL-MCNC: 8.5 MG/DL (ref 8.5–10.5)
CHLORIDE SERPL-SCNC: 108 MMOL/L (ref 96–112)
CHOLEST SERPL-MCNC: 165 MG/DL (ref 100–199)
CO2 SERPL-SCNC: 22 MMOL/L (ref 20–33)
CREAT SERPL-MCNC: 0.63 MG/DL (ref 0.5–1.4)
EOSINOPHIL # BLD AUTO: 0.12 K/UL (ref 0–0.51)
EOSINOPHIL NFR BLD: 1.1 % (ref 0–6.9)
ERYTHROCYTE [DISTWIDTH] IN BLOOD BY AUTOMATED COUNT: 44.1 FL (ref 35.9–50)
GLOBULIN SER CALC-MCNC: 3.5 G/DL (ref 1.9–3.5)
GLUCOSE SERPL-MCNC: 91 MG/DL (ref 65–99)
HCT VFR BLD AUTO: 40 % (ref 37–47)
HDLC SERPL-MCNC: 48 MG/DL
HGB BLD-MCNC: 12.7 G/DL (ref 12–16)
IMM GRANULOCYTES # BLD AUTO: 0.03 K/UL (ref 0–0.11)
IMM GRANULOCYTES NFR BLD AUTO: 0.3 % (ref 0–0.9)
LDLC SERPL CALC-MCNC: 92 MG/DL
LYMPHOCYTES # BLD AUTO: 3.82 K/UL (ref 1–4.8)
LYMPHOCYTES NFR BLD: 34.4 % (ref 22–41)
MCH RBC QN AUTO: 28.5 PG (ref 27–33)
MCHC RBC AUTO-ENTMCNC: 31.8 G/DL (ref 33.6–35)
MCV RBC AUTO: 89.7 FL (ref 81.4–97.8)
MONOCYTES # BLD AUTO: 0.8 K/UL (ref 0–0.85)
MONOCYTES NFR BLD AUTO: 7.2 % (ref 0–13.4)
NEUTROPHILS # BLD AUTO: 6.22 K/UL (ref 2–7.15)
NEUTROPHILS NFR BLD: 56.1 % (ref 44–72)
NRBC # BLD AUTO: 0 K/UL
NRBC BLD-RTO: 0 /100 WBC
PLATELET # BLD AUTO: 313 K/UL (ref 164–446)
PMV BLD AUTO: 13.7 FL (ref 9–12.9)
POTASSIUM SERPL-SCNC: 4 MMOL/L (ref 3.6–5.5)
PROT SERPL-MCNC: 6.6 G/DL (ref 6–8.2)
RBC # BLD AUTO: 4.46 M/UL (ref 4.2–5.4)
SODIUM SERPL-SCNC: 136 MMOL/L (ref 135–145)
TRIGL SERPL-MCNC: 127 MG/DL (ref 0–149)
WBC # BLD AUTO: 11.1 K/UL (ref 4.8–10.8)

## 2018-05-14 PROCEDURE — 85025 COMPLETE CBC W/AUTO DIFF WBC: CPT

## 2018-05-14 PROCEDURE — 36415 COLL VENOUS BLD VENIPUNCTURE: CPT

## 2018-05-14 PROCEDURE — 80053 COMPREHEN METABOLIC PANEL: CPT

## 2018-05-14 PROCEDURE — 80061 LIPID PANEL: CPT

## 2018-05-16 ENCOUNTER — OFFICE VISIT (OUTPATIENT)
Dept: MEDICAL GROUP | Facility: PHYSICIAN GROUP | Age: 38
End: 2018-05-16
Payer: COMMERCIAL

## 2018-05-16 VITALS
BODY MASS INDEX: 32.61 KG/M2 | HEART RATE: 73 BPM | HEIGHT: 64 IN | DIASTOLIC BLOOD PRESSURE: 62 MMHG | TEMPERATURE: 97.7 F | SYSTOLIC BLOOD PRESSURE: 110 MMHG | OXYGEN SATURATION: 99 % | WEIGHT: 191 LBS | RESPIRATION RATE: 14 BRPM

## 2018-05-16 DIAGNOSIS — D72.829 LEUKOCYTOSIS, UNSPECIFIED TYPE: ICD-10-CM

## 2018-05-16 DIAGNOSIS — E66.9 OBESITY (BMI 35.0-39.9 WITHOUT COMORBIDITY): ICD-10-CM

## 2018-05-16 DIAGNOSIS — I10 ESSENTIAL HYPERTENSION: ICD-10-CM

## 2018-05-16 DIAGNOSIS — R77.0 ABNORMALITY OF ALBUMIN: ICD-10-CM

## 2018-05-16 DIAGNOSIS — N64.4 BREAST TENDERNESS: ICD-10-CM

## 2018-05-16 PROCEDURE — 99214 OFFICE O/P EST MOD 30 MIN: CPT | Performed by: INTERNAL MEDICINE

## 2018-05-16 RX ORDER — AMLODIPINE BESYLATE 5 MG/1
5 TABLET ORAL DAILY
Qty: 90 TAB | Refills: 3 | Status: SHIPPED | OUTPATIENT
Start: 2018-05-16 | End: 2018-11-21

## 2018-05-16 NOTE — ASSESSMENT & PLAN NOTE
Her father was recently diagnosed with angioedema after using ace for 10 years. Pt is concerned for that. She was switched from lisinopril to metoprolol. She is concerned at work /86 on metoprolol, well controled. She denies chest pain, sob, leg swelling, blurry vision, lightheaded. She has been on amlodipine when she was pregnant, but was taken off amlodipine because of pregnancy.

## 2018-05-16 NOTE — LETTER
[facility]  Slick Whitten M.D.  [address]  [place] [address]  Fax: [phone]   Authorization for Release/Disclosure of   Protected Health Information   Name: HALLE CUNNINGHAM : 1980 SSN: [id number]   Address: [address] [place], [place] [address]  [place] [address] Phone:    [phone] (home) [phone] (work)   I authorize the entity listed below to release/disclose the PHI below to:   [facility]/Slick Whitten M.D. and Slick Whitten M.D.   Provider or Entity Name:  [facility]    Address   City, [facility]   Phone:      Fax:     Reason for request: continuity of care   Information to be released:    [  ] LAST COLONOSCOPY,  including any PATH REPORT and follow-up  [  ] LAST FIT/COLOGUARD RESULT [  ] LAST DEXA  [  ] LAST MAMMOGRAM  [ X ] LAST PAP  [  ] LAST LABS [  ] RETINA EXAM REPORT  [  ] IMMUNIZATION RECORDS  [  ] Release all info      [  ] Check here and initial the line next to each item to release ALL health information INCLUDING  _____ Care and treatment for drug and / or alcohol abuse  _____ HIV testing, infection status, or AIDS  _____ Genetic Testing    DATES OF SERVICE OR TIME PERIOD TO BE DISCLOSED: _____________  I understand and acknowledge that:  * This Authorization may be revoked at any time by you in writing, except if your health information has already been used or disclosed.  * Your health information that will be used or disclosed as a result of you signing this authorization could be re-disclosed by the recipient. If this occurs, your re-disclosed health information may no longer be protected by State or Federal laws.  * You may refuse to sign this Authorization. Your refusal will not affect your ability to obtain treatment.  * This Authorization becomes effective upon signing and will  on (date) __________.      If no date is indicated, this Authorization will  one (1) year from the signature date.    Name: Halle Walker  NaliniRon    Signature:   Date:     5/16/2018       PLEASE FAX REQUESTED RECORDS BACK TO: [phone]

## 2018-05-16 NOTE — ASSESSMENT & PLAN NOTE
Note from lab work albumin 3.1, leukocytosis, no B signs. She is eating healthier, but she denies extreme diet. She does not feel sick. She is up to date with preventive care. Continue to monitor

## 2018-05-16 NOTE — ASSESSMENT & PLAN NOTE
Noted leukocytosis again., nl 03/2018. She lymphadenopathy, night sweats, unintentional weight loss. She denies fever, diarrhea, dysuria, hemoptysis, melanotic stool, hematochezia, recent dental work up. TSH nl 08/2017. Continue to monitor

## 2018-05-16 NOTE — ASSESSMENT & PLAN NOTE
She has joined a gym, she had lost 14 lbs. She is trying to eat healthy. Counseling for a small portion, balanced diet, exercising for3-5 times per week.

## 2018-05-16 NOTE — PROGRESS NOTES
Subjective:   Halle Savage is a 37 y.o. female here today for hypertension, lab work review     Hypertension  Her father was recently diagnosed with angioedema after using ace for 10 years. Pt is concerned for that. She was switched from lisinopril to metoprolol. She is concerned at work /86 on metoprolol, well controled. She denies chest pain, sob, leg swelling, blurry vision, lightheaded. She has been on amlodipine when she was pregnant, but was taken off amlodipine because of pregnancy.     Leukocytosis  Noted leukocytosis again., nl 03/2018. She lymphadenopathy, night sweats, unintentional weight loss. She denies fever, diarrhea, dysuria, hemoptysis, melanotic stool, hematochezia, recent dental work up. TSH nl 08/2017. Continue to monitor     Obesity (BMI 35.0-39.9 without comorbidity) (HCC)  She has joined a gym, she had lost 14 lbs. She is trying to eat healthy. Counseling for a small portion, balanced diet, exercising for3-5 times per week.      Breast tenderness  OCP is helping, regular menstrual cycle. No side effects     Abnormality of albumin  Note from lab work albumin 3.1, leukocytosis, no B signs. She is eating healthier, but she denies extreme diet. She does not feel sick. She is up to date with preventive care. Continue to monitor       Current medicines (including changes today)  Current Outpatient Prescriptions   Medication Sig Dispense Refill   • amLODIPine (NORVASC) 5 MG Tab Take 1 Tab by mouth every day. 90 Tab 3   • drospirenone-ethinyl estradiol (ROSENDA) 3-0.03 MG per tablet Take 1 Tab by mouth every day. 28 Tab 11   • Multiple Vitamins-Minerals (MULTIVITAMIN PO) Take  by mouth.       No current facility-administered medications for this visit.      She  has a past medical history of Hypertension.    Current Outpatient Prescriptions   Medication Sig Dispense Refill   • amLODIPine (NORVASC) 5 MG Tab Take 1 Tab by mouth every day. 90 Tab 3   • drospirenone-ethinyl estradiol  "(ROSENDA) 3-0.03 MG per tablet Take 1 Tab by mouth every day. 28 Tab 11   • Multiple Vitamins-Minerals (MULTIVITAMIN PO) Take  by mouth.       No current facility-administered medications for this visit.        Allergies as of 05/16/2018 - Reviewed 05/16/2018   Allergen Reaction Noted   • Augmentin Hives 07/13/2014       Social History     Social History   • Marital status: Single     Spouse name: N/A   • Number of children: N/A   • Years of education: N/A     Occupational History   • Not on file.     Social History Main Topics   • Smoking status: Never Smoker   • Smokeless tobacco: Never Used   • Alcohol use No   • Drug use: No   • Sexual activity: Yes      Comment: 3 boys     Other Topics Concern   • Not on file     Social History Narrative   • No narrative on file        Family History   Problem Relation Age of Onset   • Hypertension Mother    • Hyperlipidemia Mother    • Hypertension Father    • Hyperlipidemia Father    • Alcohol/Drug Father    • Cancer Father      kidney cancer   • Diabetes Neg Hx        Past Surgical History:   Procedure Laterality Date   • REPEAT C SECTION W TUBAL LIGATION  6/1/2016    Procedure: REPEAT C SECTION WITH TUBAL LIGATION;  Surgeon: Jana Shepherd M.D.;  Location: LABOR AND DELIVERY;  Service:    • EYE SURGERY      lasix   • LYMPH NODE EXCISION      right side of the neck   • PRIMARY C SECTION     • TONSILLECTOMY     • TUBE & ECTOPIC PREG., REMOVAL         ROS   All systems reviewed are negative except for HPI       Objective:     Blood pressure 110/62, pulse 73, temperature 36.5 °C (97.7 °F), resp. rate 14, height 1.613 m (5' 3.5\"), weight 86.6 kg (191 lb), last menstrual period 04/28/2018, SpO2 99 %, not currently breastfeeding. Body mass index is 33.3 kg/m².   Physical Exam:  Constitutional: Alert, no distress.  Skin: Warm, dry, good turgor, no rashes in visible areas.  Eye: Equal, round and reactive, conjunctiva clear, lids normal.  ENMT: Lips without lesions, good " dentition, oropharynx clear.  Neck: Trachea midline, no masses, no thyromegaly. No cervical or supraclavicular lymphadenopathy  Respiratory: Unlabored respiratory effort, lungs clear to auscultation, no wheezes, no ronchi.  Cardiovascular: Normal S1, S2, no murmur, no edema.  Abdomen: Soft, non-tender, no masses, no hepatosplenomegaly.  Psych: Alert and oriented x3, normal affect and mood.        Assessment and Plan:   The following treatment plan was discussed    1. Essential hypertension  I will switch her to amlodipine 5 mg daily. She has tolerated before. We can continue to monitor   - amLODIPine (NORVASC) 5 MG Tab; Take 1 Tab by mouth every day.  Dispense: 90 Tab; Refill: 3    2. Obesity (BMI 35.0-39.9 without comorbidity)  Counseling for a small portion, balanced diet, exercising for3-5 times per week.    3. Breast tenderness  Continue OCP     4. Leukocytosis, unspecified type  Continue to monitor   - CBC WITH DIFFERENTIAL; Future  - TSH WITH REFLEX TO FT4; Future    5. Abnormality of albumin  Continue to monitor . No B signs   - COMP METABOLIC PANEL; Future      Followup: Return in about 6 months (around 11/16/2018), or if symptoms worsen or fail to improve, for Short.

## 2018-08-14 ENCOUNTER — DOCUMENTATION (OUTPATIENT)
Dept: OCCUPATIONAL MEDICINE | Facility: CLINIC | Age: 38
End: 2018-08-14

## 2018-09-12 ENCOUNTER — EH NON-PROVIDER (OUTPATIENT)
Dept: OCCUPATIONAL MEDICINE | Facility: CLINIC | Age: 38
End: 2018-09-12

## 2018-09-12 DIAGNOSIS — Z02.89 ENCOUNTER FOR OCCUPATIONAL HEALTH EXAMINATION INVOLVING RESPIRATOR: Primary | ICD-10-CM

## 2018-09-12 PROCEDURE — 94375 RESPIRATORY FLOW VOLUME LOOP: CPT | Performed by: PREVENTIVE MEDICINE

## 2018-09-14 ENCOUNTER — HOSPITAL ENCOUNTER (OUTPATIENT)
Dept: LAB | Facility: MEDICAL CENTER | Age: 38
End: 2018-09-14
Attending: INTERNAL MEDICINE
Payer: COMMERCIAL

## 2018-09-14 DIAGNOSIS — R77.0 ABNORMALITY OF ALBUMIN: ICD-10-CM

## 2018-09-14 DIAGNOSIS — D72.829 LEUKOCYTOSIS, UNSPECIFIED TYPE: ICD-10-CM

## 2018-09-14 LAB
ALBUMIN SERPL BCP-MCNC: 3.6 G/DL (ref 3.2–4.9)
ALBUMIN/GLOB SERPL: 1.1 G/DL
ALP SERPL-CCNC: 57 U/L (ref 30–99)
ALT SERPL-CCNC: 12 U/L (ref 2–50)
ANION GAP SERPL CALC-SCNC: 8 MMOL/L (ref 0–11.9)
AST SERPL-CCNC: 15 U/L (ref 12–45)
BASOPHILS # BLD AUTO: 1.3 % (ref 0–1.8)
BASOPHILS # BLD: 0.12 K/UL (ref 0–0.12)
BDY FAT % MEASURED: 40.8 %
BILIRUB SERPL-MCNC: 0.3 MG/DL (ref 0.1–1.5)
BP DIAS: 70 MMHG
BP SYS: 120 MMHG
BUN SERPL-MCNC: 9 MG/DL (ref 8–22)
CALCIUM SERPL-MCNC: 9.2 MG/DL (ref 8.5–10.5)
CHLORIDE SERPL-SCNC: 107 MMOL/L (ref 96–112)
CHOLEST SERPL-MCNC: 211 MG/DL (ref 100–199)
CO2 SERPL-SCNC: 23 MMOL/L (ref 20–33)
CREAT SERPL-MCNC: 0.66 MG/DL (ref 0.5–1.4)
DIABETES HTDIA: NO
EOSINOPHIL # BLD AUTO: 0.13 K/UL (ref 0–0.51)
EOSINOPHIL NFR BLD: 1.4 % (ref 0–6.9)
ERYTHROCYTE [DISTWIDTH] IN BLOOD BY AUTOMATED COUNT: 45 FL (ref 35.9–50)
EVENT NAME HTEVT: NORMAL
FASTING HTFAS: YES
FASTING STATUS PATIENT QL REPORTED: NORMAL
GLOBULIN SER CALC-MCNC: 3.4 G/DL (ref 1.9–3.5)
GLUCOSE SERPL-MCNC: 85 MG/DL (ref 65–99)
GLUCOSE SERPL-MCNC: 86 MG/DL (ref 65–99)
HCT VFR BLD AUTO: 42 % (ref 37–47)
HDLC SERPL-MCNC: 58 MG/DL
HGB BLD-MCNC: 13 G/DL (ref 12–16)
HYPERTENSION HTHYP: YES
IMM GRANULOCYTES # BLD AUTO: 0.01 K/UL (ref 0–0.11)
IMM GRANULOCYTES NFR BLD AUTO: 0.1 % (ref 0–0.9)
LDLC SERPL CALC-MCNC: 130 MG/DL
LYMPHOCYTES # BLD AUTO: 3.41 K/UL (ref 1–4.8)
LYMPHOCYTES NFR BLD: 36.6 % (ref 22–41)
MCH RBC QN AUTO: 27.6 PG (ref 27–33)
MCHC RBC AUTO-ENTMCNC: 31 G/DL (ref 33.6–35)
MCV RBC AUTO: 89.2 FL (ref 81.4–97.8)
MONOCYTES # BLD AUTO: 0.6 K/UL (ref 0–0.85)
MONOCYTES NFR BLD AUTO: 6.4 % (ref 0–13.4)
NEUTROPHILS # BLD AUTO: 5.04 K/UL (ref 2–7.15)
NEUTROPHILS NFR BLD: 54.2 % (ref 44–72)
NRBC # BLD AUTO: 0 K/UL
NRBC BLD-RTO: 0 /100 WBC
PLATELET # BLD AUTO: 324 K/UL (ref 164–446)
PMV BLD AUTO: 13.1 FL (ref 9–12.9)
POTASSIUM SERPL-SCNC: 4 MMOL/L (ref 3.6–5.5)
PROT SERPL-MCNC: 7 G/DL (ref 6–8.2)
RBC # BLD AUTO: 4.71 M/UL (ref 4.2–5.4)
SCREENING LOC CITY HTCIT: NORMAL
SCREENING LOC STATE HTSTA: NORMAL
SCREENING LOCATION HTLOC: NORMAL
SMOKING HTSMO: NO
SODIUM SERPL-SCNC: 138 MMOL/L (ref 135–145)
SUBSCRIBER ID HTSID: NORMAL
TRIGL SERPL-MCNC: 115 MG/DL (ref 0–149)
TSH SERPL DL<=0.005 MIU/L-ACNC: 2.64 UIU/ML (ref 0.38–5.33)
WBC # BLD AUTO: 9.3 K/UL (ref 4.8–10.8)

## 2018-09-14 PROCEDURE — 84443 ASSAY THYROID STIM HORMONE: CPT

## 2018-09-14 PROCEDURE — 80053 COMPREHEN METABOLIC PANEL: CPT

## 2018-09-14 PROCEDURE — 85025 COMPLETE CBC W/AUTO DIFF WBC: CPT

## 2018-09-14 PROCEDURE — 82947 ASSAY GLUCOSE BLOOD QUANT: CPT

## 2018-09-14 PROCEDURE — 80061 LIPID PANEL: CPT

## 2018-09-14 PROCEDURE — 36415 COLL VENOUS BLD VENIPUNCTURE: CPT

## 2018-09-14 PROCEDURE — S5190 WELLNESS ASSESSMENT BY NONPH: HCPCS

## 2018-11-07 ENCOUNTER — OFFICE VISIT (OUTPATIENT)
Dept: URGENT CARE | Facility: CLINIC | Age: 38
End: 2018-11-07
Payer: COMMERCIAL

## 2018-11-07 VITALS
OXYGEN SATURATION: 93 % | TEMPERATURE: 98.1 F | HEIGHT: 63 IN | WEIGHT: 192.6 LBS | RESPIRATION RATE: 18 BRPM | SYSTOLIC BLOOD PRESSURE: 102 MMHG | BODY MASS INDEX: 34.12 KG/M2 | DIASTOLIC BLOOD PRESSURE: 70 MMHG | HEART RATE: 112 BPM

## 2018-11-07 DIAGNOSIS — J03.90 TONSILLITIS: ICD-10-CM

## 2018-11-07 DIAGNOSIS — R50.9 FEVER, UNSPECIFIED FEVER CAUSE: ICD-10-CM

## 2018-11-07 PROCEDURE — 99214 OFFICE O/P EST MOD 30 MIN: CPT | Performed by: PHYSICIAN ASSISTANT

## 2018-11-07 RX ORDER — CEFUROXIME AXETIL 500 MG/1
500 TABLET ORAL 2 TIMES DAILY
Qty: 20 TAB | Refills: 0 | Status: SHIPPED | OUTPATIENT
Start: 2018-11-07 | End: 2018-11-17

## 2018-11-07 ASSESSMENT — ENCOUNTER SYMPTOMS
GASTROINTESTINAL NEGATIVE: 1
SWOLLEN GLANDS: 1
RESPIRATORY NEGATIVE: 1
CONSTITUTIONAL NEGATIVE: 1
CARDIOVASCULAR NEGATIVE: 1
SORE THROAT: 1
EYES NEGATIVE: 1
COUGH: 0
TROUBLE SWALLOWING: 1

## 2018-11-07 NOTE — PROGRESS NOTES
Subjective:      Halle Savage is a 38 y.o. female who presents with Pharyngitis (fever, sore throat, cough, chest congestion, stomach ache, vomiting, diarrhea)            Pharyngitis    This is a new (fever, st) problem. The current episode started yesterday. The problem has been unchanged. Neither side of throat is experiencing more pain than the other. The fever has been present for less than 1 day. The pain is moderate. Associated symptoms include swollen glands and trouble swallowing. Pertinent negatives include no coughing. She has had no exposure to strep. She has tried nothing for the symptoms. The treatment provided no relief.       Review of Systems   Constitutional: Negative.    HENT: Positive for sore throat and trouble swallowing.    Eyes: Negative.    Respiratory: Negative.  Negative for cough.    Cardiovascular: Negative.    Gastrointestinal: Negative.    Skin: Negative.    All other systems reviewed and are negative.         Objective:     LMP 04/28/2018      Physical Exam   Constitutional: She is oriented to person, place, and time. She appears well-developed and well-nourished. No distress.   HENT:   Head: Normocephalic and atraumatic.   Nose: Nose normal.   Mouth/Throat: No oropharyngeal exudate.   +phar./tons redn     Eyes: Pupils are equal, round, and reactive to light. Conjunctivae and EOM are normal.   Neck: Normal range of motion. Neck supple.   Cardiovascular: Normal rate and regular rhythm.    Pulmonary/Chest: Effort normal and breath sounds normal. No respiratory distress.   Genitourinary: Vaginal discharge:     Lymphadenopathy:     She has cervical adenopathy (+submandib. adenop).   Neurological: She is alert and oriented to person, place, and time.   Skin: Skin is warm and dry. No rash noted.   Psychiatric: She has a normal mood and affect. Her behavior is normal.   Nursing note and vitals reviewed.    Active Ambulatory Problems     Diagnosis Date Noted   • Hypertension  01/23/2015   • Leukocytosis 01/23/2015   • Obesity (BMI 35.0-39.9 without comorbidity) 08/09/2017   • Skin lesion 08/09/2017   • Breast tenderness 11/14/2017   • Premenstrual syndrome 11/14/2017   • Abnormality of albumin 05/16/2018     Resolved Ambulatory Problems     Diagnosis Date Noted   • Elevated blood protein 01/23/2015   • Normocytic anemia 08/09/2017     Past Medical History:   Diagnosis Date   • Hypertension      Current Outpatient Prescriptions on File Prior to Visit   Medication Sig Dispense Refill   • amLODIPine (NORVASC) 5 MG Tab Take 1 Tab by mouth every day. 90 Tab 3   • drospirenone-ethinyl estradiol (ROSENDA) 3-0.03 MG per tablet Take 1 Tab by mouth every day. 28 Tab 11   • Multiple Vitamins-Minerals (MULTIVITAMIN PO) Take  by mouth.       No current facility-administered medications on file prior to visit.      Social History     Social History   • Marital status: Single     Spouse name: N/A   • Number of children: N/A   • Years of education: N/A     Occupational History   • Not on file.     Social History Main Topics   • Smoking status: Never Smoker   • Smokeless tobacco: Never Used   • Alcohol use No   • Drug use: No   • Sexual activity: Yes      Comment: 3 boys     Other Topics Concern   • Not on file     Social History Narrative   • No narrative on file     Family History   Problem Relation Age of Onset   • Hypertension Mother    • Hyperlipidemia Mother    • Hypertension Father    • Hyperlipidemia Father    • Alcohol/Drug Father    • Cancer Father         kidney cancer   • Diabetes Neg Hx      Augmentin              Assessment/Plan:     ·  fever, st      · rx meds; Gargles, Cepacol lozenges, Aleve/Advil as needed for throat pain; rx abx;  Return to clinic 3-5 days if symptoms persist or worsen or follow up with Primary Doctor

## 2018-11-21 ENCOUNTER — OFFICE VISIT (OUTPATIENT)
Dept: MEDICAL GROUP | Facility: PHYSICIAN GROUP | Age: 38
End: 2018-11-21
Payer: COMMERCIAL

## 2018-11-21 VITALS
BODY MASS INDEX: 34.66 KG/M2 | TEMPERATURE: 98.3 F | HEART RATE: 88 BPM | WEIGHT: 195.6 LBS | OXYGEN SATURATION: 99 % | HEIGHT: 63 IN | SYSTOLIC BLOOD PRESSURE: 140 MMHG | DIASTOLIC BLOOD PRESSURE: 94 MMHG | RESPIRATION RATE: 16 BRPM

## 2018-11-21 DIAGNOSIS — I10 ESSENTIAL HYPERTENSION: ICD-10-CM

## 2018-11-21 DIAGNOSIS — K13.79 MOUTH SORES: ICD-10-CM

## 2018-11-21 PROCEDURE — 99214 OFFICE O/P EST MOD 30 MIN: CPT | Performed by: PHYSICIAN ASSISTANT

## 2018-11-21 RX ORDER — AMLODIPINE BESYLATE 10 MG/1
10 TABLET ORAL DAILY
Qty: 90 TAB | Refills: 3 | Status: SHIPPED | OUTPATIENT
Start: 2018-11-21 | End: 2019-01-24

## 2018-11-21 ASSESSMENT — PATIENT HEALTH QUESTIONNAIRE - PHQ9: CLINICAL INTERPRETATION OF PHQ2 SCORE: 0

## 2018-11-21 ASSESSMENT — PAIN SCALES - GENERAL: PAINLEVEL: NO PAIN

## 2018-11-22 NOTE — PROGRESS NOTES
Chief Complaint   Patient presents with   • Hypertension       HISTORY OF PRESENT ILLNESS: Halle Savage is an established 38 y.o. female here to discuss the evaluation and management of:    Essential hypertension  Chronic with stable problem.  Patient is here today to follow-up on hypertension.  Patient's blood pressure was taken on 2 separate occasions during today's appointment.  First reading 140/94 mmHg.  Second reading 140/94 mmHg.  She tells me she is currently taking amlodipine 5 mg tab once daily.  States she is compliant with medication experience is no side effects or comp occasions medication.  She admits to monitoring blood pressure at home.  States in the morning without medication blood pressure on average is 140/80 mmHg.  States after taking blood pressure medication systolic blood pressure ranges from 110-120 mmHg and diastolic ranges from 70-80 mmHg.  She denies having a regular exercise routine at this time.  Admits diet could improve.  Denies chest pain, shortness of breath, heart palpitations, dizziness, syncope, severe headache, vision changes or peripheral edema.    Mouth sores  She tells me that she recently had strep throat and was prescribed antibiotics.  States that after she developed white patches on her tongue that were painful.  States she is still experiencing intermittent oral mouth pain when eating and drinking.        Patient Active Problem List    Diagnosis Date Noted   • Mouth sores 11/21/2018   • Abnormality of albumin 05/16/2018   • Breast tenderness 11/14/2017   • Premenstrual syndrome 11/14/2017   • Obesity (BMI 35.0-39.9 without comorbidity) 08/09/2017   • Skin lesion 08/09/2017   • Hypertension 01/23/2015   • Leukocytosis 01/23/2015       Allergies:Augmentin    Current Outpatient Prescriptions   Medication Sig Dispense Refill   • amLODIPine (NORVASC) 10 MG Tab Take 1 Tab by mouth every day. 90 Tab 3   • nystatin (MYCOSTATIN) 984706 UNIT/ML Suspension Take 5 mL  "by mouth 4 times a day. 200 mL 0   • Multiple Vitamins-Minerals (MULTIVITAMIN PO) Take  by mouth.     • Ibuprofen (ADVIL PO) Take  by mouth.       No current facility-administered medications for this visit.        Social History   Substance Use Topics   • Smoking status: Never Smoker   • Smokeless tobacco: Never Used   • Alcohol use No       Family Status   Relation Status   • Mo Alive   • Fa Alive   • Sis Alive   • Neg Hx (Not Specified)     Family History   Problem Relation Age of Onset   • Hypertension Mother    • Hyperlipidemia Mother    • Hypertension Father    • Hyperlipidemia Father    • Alcohol/Drug Father    • Cancer Father         kidney cancer   • Diabetes Neg Hx        ROS:  Review of Systems   Constitutional: Negative for fever, chills, weight loss and malaise/fatigue.   HENT: Negative for ear pain, nosebleeds, congestion, sore throat and neck pain.  Positive for oral cavity pain with eating and drinking.  Eyes: Negative for blurred vision.   Respiratory: Negative for cough, sputum production, shortness of breath and wheezing.    Cardiovascular: Negative for chest pain, palpitations, orthopnea and leg swelling.   Gastrointestinal: Negative for heartburn, nausea, vomiting and abdominal pain.   Genitourinary: Negative for dysuria, urgency and frequency.   Musculoskeletal: Negative for myalgias, back pain and joint pain.   Skin: Negative for rash and itching.   Neurological: Negative for dizziness, tingling, tremors, sensory change, focal weakness and headaches.   Endo/Heme/Allergies: Does not bruise/bleed easily.   Psychiatric/Behavioral: Negative for depression, suicidal ideas and memory loss.  The patient is not nervous/anxious and does not have insomnia.    All other systems reviewed and are negative except as in HPI.    Exam: Blood pressure 140/94, pulse 88, temperature 36.8 °C (98.3 °F), temperature source Temporal, resp. rate 16, height 1.6 m (5' 3\"), weight 88.7 kg (195 lb 9.6 oz), last menstrual " period 11/07/2018, SpO2 99 %, not currently breastfeeding. Body mass index is 34.65 kg/m².  General: Normal appearing. No distress.  HEENT: Normocephalic. Eyes conjunctiva clear lids without ptosis, pupils equal and reactive to light accommodation, ears normal shape and contour, canals are clear bilaterally, tympanic membranes are benign, nasal mucosa benign, oropharynx is without erythema, edema or exudates.   Neck: Supple without JVD or bruit. Thyroid is not enlarged.  Pulmonary: Clear to ausculation.  Normal effort. No rales, ronchi, or wheezing.  Cardiovascular: Regular rate and rhythm without murmur.   Abdomen: Soft, nontender, nondistended. Normal bowel sounds. Liver and spleen are not palpable  Neurologic: Grossly nonfocal.  Cranial nerves are normal.  Lymph: No cervical, supraclavicular or axillary lymph nodes are palpable  Skin: Warm and dry.  No rashes or suspicious skin lesions.  Musculoskeletal: Normal gait. No extremity cyanosis, clubbing, or edema.  Psych: Normal mood and affect. Alert and oriented x3. Judgment and insight is normal.    Medical decision-making and discussion:  1. Essential hypertension  Discontinue amlodipine 5 mg tab once daily and increase dosage to amlodipine 10 mg tab once daily.  Discussed proper ways to monitor blood pressure at home with patient.  Patient will follow-up in 2 months for med eval with her PCP.    Discussed decreasing salt intake. Emphasized benefits of exercise and diet. Continue to monitor.    - amLODIPine (NORVASC) 10 MG Tab; Take 1 Tab by mouth every day.  Dispense: 90 Tab; Refill: 3    2. Mouth sores  On physical exam was unable to visualize oral lesions or skin discoloration of oral cavity.    She has been prescribed nystatin oral suspension.    Follow-up for worsening symptoms,lack of expected recovery, or should new symptoms or problems arise.      - nystatin (MYCOSTATIN) 182801 UNIT/ML Suspension; Take 5 mL by mouth 4 times a day.  Dispense: 200 mL;  Refill: 0      Please note that this dictation was created using voice recognition software. I have made every reasonable attempt to correct obvious errors, but I expect that there are errors of grammar and possibly content that I did not discover before finalizing the note.        Return in about 2 months (around 1/21/2019), or if symptoms worsen or fail to improve.

## 2019-01-24 ENCOUNTER — OFFICE VISIT (OUTPATIENT)
Dept: MEDICAL GROUP | Facility: PHYSICIAN GROUP | Age: 39
End: 2019-01-24
Payer: COMMERCIAL

## 2019-01-24 VITALS
HEIGHT: 63 IN | RESPIRATION RATE: 16 BRPM | HEART RATE: 79 BPM | SYSTOLIC BLOOD PRESSURE: 140 MMHG | BODY MASS INDEX: 35.61 KG/M2 | DIASTOLIC BLOOD PRESSURE: 96 MMHG | WEIGHT: 201 LBS | OXYGEN SATURATION: 99 % | TEMPERATURE: 97.7 F

## 2019-01-24 DIAGNOSIS — I10 ESSENTIAL HYPERTENSION: ICD-10-CM

## 2019-01-24 DIAGNOSIS — E66.9 OBESITY (BMI 35.0-39.9 WITHOUT COMORBIDITY): ICD-10-CM

## 2019-01-24 PROBLEM — L98.9 SKIN LESION: Status: RESOLVED | Noted: 2017-08-09 | Resolved: 2019-01-24

## 2019-01-24 PROBLEM — R77.0 ABNORMALITY OF ALBUMIN: Status: RESOLVED | Noted: 2018-05-16 | Resolved: 2019-01-24

## 2019-01-24 PROBLEM — N64.4 BREAST TENDERNESS: Status: RESOLVED | Noted: 2017-11-14 | Resolved: 2019-01-24

## 2019-01-24 PROBLEM — K13.79 MOUTH SORES: Status: RESOLVED | Noted: 2018-11-21 | Resolved: 2019-01-24

## 2019-01-24 PROCEDURE — 99214 OFFICE O/P EST MOD 30 MIN: CPT | Performed by: INTERNAL MEDICINE

## 2019-01-24 RX ORDER — HYDROCHLOROTHIAZIDE 25 MG/1
25 TABLET ORAL DAILY
Qty: 30 TAB | Refills: 3 | Status: SHIPPED | OUTPATIENT
Start: 2019-01-24 | End: 2019-02-22

## 2019-01-24 ASSESSMENT — PATIENT HEALTH QUESTIONNAIRE - PHQ9: CLINICAL INTERPRETATION OF PHQ2 SCORE: 0

## 2019-01-24 NOTE — PROGRESS NOTES
Subjective:   Halle Savage is a 38 y.o. female here today for hypertension     39 y/o F with pmh of hypertension presented reporting the blood pressures not well controlled.  Patient has been on lisinopril in the past for hypertension.however she had to stop it because her father had angioedema on ace. Her blood pressure is high. She was started last on amlodipine 5 mg. BP still not well controled. It was increased to 10 mg daily. She started to have leg swelling. She stopped taking it because of swelling. BP today 150/90.  She denies headache, leg swelling, blurry vision, lightheadedness, palpitation, chest pain, shortness of breath.     Current medicines (including changes today)  Current Outpatient Prescriptions   Medication Sig Dispense Refill   • hydroCHLOROthiazide (HYDRODIURIL) 25 MG Tab Take 1 Tab by mouth every day. 30 Tab 3   • nystatin (MYCOSTATIN) 329706 UNIT/ML Suspension Take 5 mL by mouth 4 times a day. 200 mL 0   • Ibuprofen (ADVIL PO) Take  by mouth.     • Multiple Vitamins-Minerals (MULTIVITAMIN PO) Take  by mouth.       No current facility-administered medications for this visit.      She  has a past medical history of Hypertension.    Current Outpatient Prescriptions   Medication Sig Dispense Refill   • hydroCHLOROthiazide (HYDRODIURIL) 25 MG Tab Take 1 Tab by mouth every day. 30 Tab 3   • nystatin (MYCOSTATIN) 743908 UNIT/ML Suspension Take 5 mL by mouth 4 times a day. 200 mL 0   • Ibuprofen (ADVIL PO) Take  by mouth.     • Multiple Vitamins-Minerals (MULTIVITAMIN PO) Take  by mouth.       No current facility-administered medications for this visit.        Allergies as of 01/24/2019 - Reviewed 01/24/2019   Allergen Reaction Noted   • Augmentin Hives 07/13/2014       Social History     Social History   • Marital status: Single     Spouse name: N/A   • Number of children: N/A   • Years of education: N/A     Occupational History   • Not on file.     Social History Main Topics   •  "Smoking status: Never Smoker   • Smokeless tobacco: Never Used   • Alcohol use No   • Drug use: No   • Sexual activity: Yes      Comment: 3 boys     Other Topics Concern   • Not on file     Social History Narrative   • No narrative on file        Family History   Problem Relation Age of Onset   • Hypertension Mother    • Hyperlipidemia Mother    • Hypertension Father    • Hyperlipidemia Father    • Alcohol/Drug Father    • Cancer Father         kidney cancer   • Diabetes Neg Hx        Past Surgical History:   Procedure Laterality Date   • REPEAT C SECTION W TUBAL LIGATION  6/1/2016    Procedure: REPEAT C SECTION WITH TUBAL LIGATION;  Surgeon: Jana Shepherd M.D.;  Location: LABOR [facility] DELIVERY;  Service:    • EYE SURGERY      lasix   • LYMPH NODE EXCISION      right side of the neck   • PRIMARY C SECTION     • TONSILLECTOMY     • TUBE & ECTOPIC PREG., REMOVAL         ROS   All systems reviewed are negative except for HPI       Objective:     Blood pressure 140/96, pulse 79, temperature 36.5 °C (97.7 °F), temperature source Temporal, resp. rate 16, height 1.6 m (5' 3\"), weight 91.2 kg (201 lb), SpO2 99 %, not currently breastfeeding. Body mass index is 35.61 kg/m².   Physical Exam:  Constitutional: Alert, no distress.  Skin: Warm, dry, good turgor, no rashes in visible areas.  Eye: Equal, round and reactive, conjunctiva clear, lids normal.  ENMT: Lips without lesions, good dentition, oropharynx clear.  Neck: Trachea midline, no masses, no thyromegaly. No cervical or supraclavicular lymphadenopathy  Respiratory: Unlabored respiratory effort, lungs clear to auscultation, no wheezes, no ronchi.  Cardiovascular: Normal S1, S2, no murmur, no edema.  Abdomen: Soft, non-tender, no masses, no hepatosplenomegaly.  Psych: Alert and oriented x3, normal affect and mood.        Assessment and Plan:   The following treatment plan was discussed    1. Essential hypertension  I will start pt on HCTZ 25 mg daily. Continue to " monitor. Blood work review in one week for electrolytes monitoring. Consider adding 5 mg of amlodipine if BP still not well controled on HCTZ .   - hydroCHLOROthiazide (HYDRODIURIL) 25 MG Tab; Take 1 Tab by mouth every day.  Dispense: 30 Tab; Refill: 3  - BASIC METABOLIC PANEL; Future    2. Obesity (BMI 35.0-39.9 without comorbidity)  She is gaining weight, due to new work, more stressed. Counseling for a small portion, balanced diet, exercising for3-5 times per week.        Followup: Return in about 4 weeks (around 2/21/2019), or if symptoms worsen or fail to improve, for Short.

## 2019-02-20 ENCOUNTER — HOSPITAL ENCOUNTER (OUTPATIENT)
Dept: LAB | Facility: MEDICAL CENTER | Age: 39
End: 2019-02-20
Attending: INTERNAL MEDICINE
Payer: COMMERCIAL

## 2019-02-20 DIAGNOSIS — I10 ESSENTIAL HYPERTENSION: ICD-10-CM

## 2019-02-20 LAB
ANION GAP SERPL CALC-SCNC: 6 MMOL/L (ref 0–11.9)
BUN SERPL-MCNC: 12 MG/DL (ref 8–22)
CALCIUM SERPL-MCNC: 10.2 MG/DL (ref 8.5–10.5)
CHLORIDE SERPL-SCNC: 100 MMOL/L (ref 96–112)
CO2 SERPL-SCNC: 29 MMOL/L (ref 20–33)
CREAT SERPL-MCNC: 0.72 MG/DL (ref 0.5–1.4)
GLUCOSE SERPL-MCNC: 91 MG/DL (ref 65–99)
POTASSIUM SERPL-SCNC: 3.5 MMOL/L (ref 3.6–5.5)
SODIUM SERPL-SCNC: 135 MMOL/L (ref 135–145)

## 2019-02-20 PROCEDURE — 80048 BASIC METABOLIC PNL TOTAL CA: CPT

## 2019-02-20 PROCEDURE — 36415 COLL VENOUS BLD VENIPUNCTURE: CPT

## 2019-02-22 ENCOUNTER — OFFICE VISIT (OUTPATIENT)
Dept: MEDICAL GROUP | Facility: PHYSICIAN GROUP | Age: 39
End: 2019-02-22
Payer: COMMERCIAL

## 2019-02-22 VITALS
SYSTOLIC BLOOD PRESSURE: 118 MMHG | BODY MASS INDEX: 35.61 KG/M2 | HEIGHT: 63 IN | TEMPERATURE: 97.3 F | DIASTOLIC BLOOD PRESSURE: 70 MMHG | WEIGHT: 201 LBS | HEART RATE: 80 BPM | RESPIRATION RATE: 14 BRPM | OXYGEN SATURATION: 98 %

## 2019-02-22 DIAGNOSIS — R00.2 PALPITATION: ICD-10-CM

## 2019-02-22 DIAGNOSIS — I10 ESSENTIAL HYPERTENSION: ICD-10-CM

## 2019-02-22 DIAGNOSIS — E87.6 HYPOKALEMIA: ICD-10-CM

## 2019-02-22 PROCEDURE — 93000 ELECTROCARDIOGRAM COMPLETE: CPT | Performed by: INTERNAL MEDICINE

## 2019-02-22 PROCEDURE — 99214 OFFICE O/P EST MOD 30 MIN: CPT | Performed by: INTERNAL MEDICINE

## 2019-02-22 RX ORDER — TRIAMTERENE AND HYDROCHLOROTHIAZIDE 37.5; 25 MG/1; MG/1
1 CAPSULE ORAL EVERY MORNING
Qty: 90 CAP | Refills: 3 | Status: SHIPPED | OUTPATIENT
Start: 2019-02-22 | End: 2020-01-20 | Stop reason: SDUPTHER

## 2019-02-22 NOTE — PROGRESS NOTES
Subjective:   Halle Savage is a 38 y.o. female here today for hypertension, lab work review     38-year-old female past medical history of obesity, hypertension presented to follow-up in regards to hypertension.  Please refer to previous note. She has not been able to tolerate amlodipine due to leg swelling. Her father has history of angioedema from ace inhibitors, so pt would prefer to no be on ARBs or ACE which is reasonable precautions. Last apt I did start her on HCTZ. She denies any side effects. She is able to tolerate well. From lab work note, mild hypokalemia. BP at home 120/70. I did personally recheck BP and it is 118/70.  She denies chest pain, shortness of breath, leg swelling, lightheadedness, blurry vision.  She reports sometimes she has palpitations which are fast and irregular.  They last only for a few seconds.  Sometimes they are triggered due to anxiety, but there are times to no trigger.   EKG personally interpretation: Sinus rhythm, heart rate 73, IN less than 200 ms, QRS less than 120 ms, borderline left axis deviation. No ST changes or Q wave     Current medicines (including changes today)  Current Outpatient Prescriptions   Medication Sig Dispense Refill   • triamterene/hctz (MAXZIDE-25/DYAZIDE) 37.5-25 MG Cap Take 1 Cap by mouth every morning. 90 Cap 3   • Multiple Vitamins-Minerals (MULTIVITAMIN PO) Take  by mouth.     • Ibuprofen (ADVIL PO) Take  by mouth.       No current facility-administered medications for this visit.      She  has a past medical history of Hypertension.    Current Outpatient Prescriptions   Medication Sig Dispense Refill   • triamterene/hctz (MAXZIDE-25/DYAZIDE) 37.5-25 MG Cap Take 1 Cap by mouth every morning. 90 Cap 3   • Multiple Vitamins-Minerals (MULTIVITAMIN PO) Take  by mouth.     • Ibuprofen (ADVIL PO) Take  by mouth.       No current facility-administered medications for this visit.        Allergies as of 02/22/2019 - Reviewed 02/22/2019  "  Allergen Reaction Noted   • Augmentin Hives 07/13/2014       Social History     Social History   • Marital status: Single     Spouse name: N/A   • Number of children: N/A   • Years of education: N/A     Occupational History   • Not on file.     Social History Main Topics   • Smoking status: Never Smoker   • Smokeless tobacco: Never Used   • Alcohol use No   • Drug use: No   • Sexual activity: Yes      Comment: 3 boys     Other Topics Concern   • Not on file     Social History Narrative   • No narrative on file        Family History   Problem Relation Age of Onset   • Hypertension Mother    • Hyperlipidemia Mother    • Hypertension Father    • Hyperlipidemia Father    • Alcohol/Drug Father    • Cancer Father         kidney cancer   • Diabetes Neg Hx        Past Surgical History:   Procedure Laterality Date   • REPEAT C SECTION W TUBAL LIGATION  6/1/2016    Procedure: REPEAT C SECTION WITH TUBAL LIGATION;  Surgeon: Jana Shepherd M.D.;  Location: LABOR [facility] DELIVERY;  Service:    • EYE SURGERY      lasix   • LYMPH NODE EXCISION      right side of the neck   • PRIMARY C SECTION     • TONSILLECTOMY     • TUBE & ECTOPIC PREG., REMOVAL         ROS   All systems reviewed are negative except for HPI       Objective:     Blood pressure 118/70, pulse 80, temperature 36.3 °C (97.3 °F), temperature source Temporal, resp. rate 14, height 1.6 m (5' 3\"), weight 91.2 kg (201 lb), SpO2 98 %, not currently breastfeeding. Body mass index is 35.61 kg/m².   Physical Exam:  Constitutional: Alert, no distress.  Skin: Warm, dry, good turgor, no rashes in visible areas.  Eye: Equal, round and reactive, conjunctiva clear, lids normal.  ENMT: Lips without lesions, good dentition, oropharynx clear.  Neck: Trachea midline, no masses, no thyromegaly. No cervical or supraclavicular lymphadenopathy  Respiratory: Unlabored respiratory effort, lungs clear to auscultation, no wheezes, no ronchi.  Cardiovascular: Normal S1, S2, no murmur, no " edema.  Abdomen: Soft, non-tender, no masses, no hepatosplenomegaly.  Psych: Alert and oriented x3, normal affect and mood.        Assessment and Plan:   The following treatment plan was discussed    1. Palpitation  EKG as per above. I will order echocardiogram for further evaluation. Consider cardiology referral if more persistent or more frequent  - EKG; Future  - EC-ECHOCARDIOGRAM COMPLETE W/ CONT; Future    2. Essential hypertension  Switch to triamterene/HCTZ since pt has hypokalemia. Follow up in 2 weeks with MA visits. If BP still elevated, we will add amlodipine 5 mg daily. I did encourage to continue monitoring at home   - Basic Metabolic Panel; Future  - EC-ECHOCARDIOGRAM COMPLETE W/ CONT; Future    3. Hypokalemia  Continue to monitor   - Basic Metabolic Panel; Future      Followup: Return in about 6 months (around 8/22/2019), or if symptoms worsen or fail to improve, for Short.

## 2019-03-15 ENCOUNTER — NON-PROVIDER VISIT (OUTPATIENT)
Dept: MEDICAL GROUP | Facility: PHYSICIAN GROUP | Age: 39
End: 2019-03-15
Payer: COMMERCIAL

## 2019-03-15 VITALS — SYSTOLIC BLOOD PRESSURE: 128 MMHG | DIASTOLIC BLOOD PRESSURE: 76 MMHG

## 2019-03-15 NOTE — PROGRESS NOTES
Halle Walker Hussein is a 38 y.o. female here for a non-provider visit for Bp check     Vitals:    03/15/19 0816   BP: 128/76   BP Location: Left arm   Patient Position: Sitting   BP Cuff Size: Large adult     If abnormal, was an in office provider notified today? Not Indicated  Routed to PCP? Yes

## 2019-05-02 ENCOUNTER — HOSPITAL ENCOUNTER (OUTPATIENT)
Dept: CARDIOLOGY | Facility: MEDICAL CENTER | Age: 39
End: 2019-05-02
Attending: INTERNAL MEDICINE
Payer: COMMERCIAL

## 2019-05-02 DIAGNOSIS — R00.2 PALPITATION: ICD-10-CM

## 2019-05-02 DIAGNOSIS — I10 ESSENTIAL HYPERTENSION: ICD-10-CM

## 2019-05-02 LAB
LV EJECT FRACT  99904: 65
LV EJECT FRACT MOD 2C 99903: 65.44
LV EJECT FRACT MOD 4C 99902: 72.15
LV EJECT FRACT MOD BP 99901: 69.01

## 2019-05-02 PROCEDURE — 93306 TTE W/DOPPLER COMPLETE: CPT

## 2019-05-02 PROCEDURE — 93306 TTE W/DOPPLER COMPLETE: CPT | Mod: 26 | Performed by: INTERNAL MEDICINE

## 2019-07-10 ENCOUNTER — OFFICE VISIT (OUTPATIENT)
Dept: MEDICAL GROUP | Facility: PHYSICIAN GROUP | Age: 39
End: 2019-07-10
Payer: COMMERCIAL

## 2019-07-10 VITALS
HEART RATE: 76 BPM | HEIGHT: 63 IN | TEMPERATURE: 97.6 F | RESPIRATION RATE: 20 BRPM | WEIGHT: 206.2 LBS | BODY MASS INDEX: 36.54 KG/M2 | SYSTOLIC BLOOD PRESSURE: 132 MMHG | DIASTOLIC BLOOD PRESSURE: 72 MMHG | OXYGEN SATURATION: 98 %

## 2019-07-10 DIAGNOSIS — I10 ESSENTIAL HYPERTENSION: Primary | ICD-10-CM

## 2019-07-10 DIAGNOSIS — N94.3 PREMENSTRUAL SYNDROME: ICD-10-CM

## 2019-07-10 DIAGNOSIS — E66.9 OBESITY (BMI 35.0-39.9 WITHOUT COMORBIDITY): ICD-10-CM

## 2019-07-10 PROBLEM — R00.2 PALPITATION: Status: RESOLVED | Noted: 2019-02-22 | Resolved: 2019-07-10

## 2019-07-10 PROCEDURE — 99214 OFFICE O/P EST MOD 30 MIN: CPT | Performed by: FAMILY MEDICINE

## 2019-07-10 RX ORDER — VALACYCLOVIR HYDROCHLORIDE 500 MG/1
500 TABLET, FILM COATED ORAL 2 TIMES DAILY PRN
Refills: 4 | COMMUNITY
Start: 2019-05-16 | End: 2024-02-28

## 2019-07-10 NOTE — ASSESSMENT & PLAN NOTE
This is a chronic condition. She follows with gynecology, Dr. Amato. There are some cycles that are worse than others and she feels more irritable and emotional. She now gets some headaches around her menses.  She was on OCPs for a little while, which helped regulate her periods. Since being off OCPs her periods have gotten heavier. So she is going to get a pelvic ultrasound soon.

## 2019-07-10 NOTE — ASSESSMENT & PLAN NOTE
This is a chronic condition.  Weight change: +5 lbs since 2/2019  She changed jobs in 12/0218 which is more sedentary and she hasn't been working on weight loss recently. She plans to do that soon.

## 2019-07-10 NOTE — PROGRESS NOTES
Subjective:     CC:  Diagnoses of Essential hypertension, Premenstrual syndrome, and Obesity (BMI 35.0-39.9 without comorbidity) were pertinent to this visit.    HISTORY OF THE PRESENT ILLNESS: Patient is a 39 y.o. female. This pleasant patient is here today to establish care. Her prior PCP was Slick Whitten MD.    Hypertension  This is a chronic condition.  Current Meds: triamterene-HCTZ 37.5-25 mg qd  Side effects: none  Home BP Los-130s/70s-80s  Associated symptoms: no cp, no sob      Premenstrual syndrome  This is a chronic condition. She follows with gynecology, Dr. Amato. There are some cycles that are worse than others and she feels more irritable and emotional. She now gets some headaches around her menses.  She was on OCPs for a little while, which helped regulate her periods. Since being off OCPs her periods have gotten heavier. So she is going to get a pelvic ultrasound soon.     Obesity (BMI 35.0-39.9 without comorbidity) ([facility])  This is a chronic condition.  Weight change: +5 lbs since 2019  She changed jobs in 218 which is more sedentary and she hasn't been working on weight loss recently. She plans to do that soon.         Allergies: Augmentin    Current Outpatient Prescriptions Ordered in [facility]   Medication Sig Dispense Refill   • triamterene/hctz (MAXZIDE-25/DYAZIDE) 37.5-25 MG Cap Take 1 Cap by mouth every morning. 90 Cap 3   • Multiple Vitamins-Minerals (MULTIVITAMIN PO) Take  by mouth.     • valACYclovir (VALTREX) 500 MG Tab Take 500 mg by mouth 2 times a day as needed.  4   • Ibuprofen (ADVIL PO) Take  by mouth.       No current Epic-ordered facility-administered medications on file.        Past Medical History:   Diagnosis Date   • Hypertension        Past Surgical History:   Procedure Laterality Date   • REPEAT C SECTION W TUBAL LIGATION  2016    Procedure: REPEAT C SECTION WITH TUBAL LIGATION;  Surgeon: Jana Shepherd M.D.;  Location: LABOR AND DELIVERY;  Service:    • EYE  "SURGERY      lasik   • LYMPH NODE EXCISION      right side of the neck   • PRIMARY C SECTION     • TONSILLECTOMY     • TUBE & ECTOPIC PREG., REMOVAL     • VEIN LIGATION      right greater saphenous for varicose veins       Social History   Substance Use Topics   • Smoking status: Never Smoker   • Smokeless tobacco: Never Used   • Alcohol use No       Social History     Social History Narrative   • No narrative on file       Family History   Problem Relation Age of Onset   • Hypertension Mother    • Hyperlipidemia Mother    • Hypertension Father    • Hyperlipidemia Father    • Alcohol/Drug Father         EtOH   • Cancer Father         kidney cancer   • Cancer Sister         skin   • Stroke Paternal Uncle    • Heart Disease Paternal Uncle    • Diabetes Neg Hx        Health Maintenance: Completed    ROS:   Gen: no fevers/chills, no changes in weight  Eyes: no changes in vision  ENT: no sore throat  Pulm: no cough  CV: no palpitations  GI: no diarrhea  : no dysuria  MSk: no myalgias  Skin: no rash  Neuro: + headaches - with menses      Objective:     Exam: /72 (BP Location: Right arm, Patient Position: Sitting, BP Cuff Size: Adult)   Pulse 76   Temp 36.4 °C (97.6 °F) (Temporal)   Resp 20   Ht 1.6 m (5' 3\")   Wt 93.5 kg (206 lb 3.2 oz)   SpO2 98%  Body mass index is 36.53 kg/m².    General: Normal appearing. No distress.  HEENT: Normocephalic. Eyes conjunctiva clear lids without ptosis, pupils equal and reactive to light accommodation, ears normal shape and contour, canals are clear bilaterally, tympanic membranes are benign, oropharynx is without erythema, edema or exudates.   Neck: Supple without JVD. Thyroid is not enlarged.  Pulmonary: Clear to ausculation.  Normal effort. No rales, ronchi, or wheezing.  Cardiovascular: Regular rate and rhythm with soft systolic murmur. Carotid and radial pulses are intact and equal bilaterally.  Abdomen: Soft, nontender, nondistended. Normal bowel sounds. Liver and " spleen are not palpable  Neurologic: Grossly nonfocal  Lymph: No cervical or supraclavicular lymph nodes are palpable  Skin: Warm and dry.  No obvious lesions.  Musculoskeletal: Normal gait. No extremity cyanosis, clubbing, or edema.  Psych: Normal mood and affect. Alert and oriented x3. Judgment and insight is normal.    Assessment & Plan:   39 y.o. female with the following -    1. Essential hypertension  This is a chronic condition, controlled.  She is currently on triamterene-hydrochlorothiazide and tolerating well without any side effect.  She reports her home blood pressure readings range 110s-130/70s-80s.  -Continue triamterene-hydrochlorothiazide 37.5-25 mg daily    2. Premenstrual syndrome  This is a chronic condition, stable.  She is following with gynecology who is managing the condition.  She gets irritability and emotional around some cycles as well as some headaches. She has also noted heavier menses and is going to get a pelvic ultrasound next week to evaluate.    3. Obesity (BMI 35.0-39.9 without comorbidity)  This is a chronic condition, stable.  She has gained approximately 5 pounds since December, 2018.  She reports that she changed jobs in December, more sedentary than she is used to.  She also has been working on weight loss recently but plans to do that soon.  We briefly discussed the Mediterranean and the DASH diet.   - Patient identified as having weight management issue.  Appropriate orders and counseling given.    Return in about 4 months (around 11/10/2019) for f/u weight loss.    Please note that this dictation was created using voice recognition software. I have made every reasonable attempt to correct obvious errors, but I expect that there are errors of grammar and possibly content that I did not discover before finalizing the note.

## 2019-07-10 NOTE — ASSESSMENT & PLAN NOTE
This is a chronic condition.  Current Meds: triamterene-HCTZ 37.5-25 mg qd  Side effects: none  Home BP Los-130s/70s-80s  Associated symptoms: no cp, no sob

## 2019-07-10 NOTE — PATIENT INSTRUCTIONS
Mediterranean Diet  DASH Diet    150 minutes of moderate aerobic exercise per week (talk but not sing)   independent

## 2019-07-10 NOTE — LETTER
[facility]  Marcelina Richard M.D.  [address]  [place] [address]  Fax: [phone]   Authorization for Release/Disclosure of   Protected Health Information   Name: HERNAN HUBER : 1980 SSN: [id number]   Address: [address] [place] [address] [address]  [place] [address] Phone:    [phone] (home) [phone] (work)   I authorize the entity listed below to release/disclose the PHI below to:   [facility]/Marcelina Richard M.D. and Marcelina Richard M.D.   Provider or Entity Name:  Serena Escobar   Address   City, State, [facility]  [place] Phone:  [phone]    Fax:  [phone]   Reason for request: continuity of care   Information to be released:    [  ] LAST COLONOSCOPY,  including any PATH REPORT and follow-up  [  ] LAST FIT/COLOGUARD RESULT [  ] LAST DEXA  [  ] LAST MAMMOGRAM  [XX] LAST PAP  [  ] LAST LABS [  ] RETINA EXAM REPORT  [  ] IMMUNIZATION RECORDS  [  ] Release all info      [  ] Check here and initial the line next to each item to release ALL health information INCLUDING  _____ Care and treatment for drug and / or alcohol abuse  _____ HIV testing, infection status, or AIDS  _____ Genetic Testing    DATES OF SERVICE OR TIME PERIOD TO BE DISCLOSED: _____________  I understand and acknowledge that:  * This Authorization may be revoked at any time by you in writing, except if your health information has already been used or disclosed.  * Your health information that will be used or disclosed as a result of you signing this authorization could be re-disclosed by the recipient. If this occurs, your re-disclosed health information may no longer be protected by State or Federal laws.  * You may refuse to sign this Authorization. Your refusal will not affect your ability to obtain treatment.  * This Authorization becomes effective upon signing and will  on (date) __________.      If no date is indicated, this Authorization will  one (1) year from the signature date.    Name:  Halle Velasquez    Signature:   Date:     7/10/2019       PLEASE FAX REQUESTED RECORDS BACK TO: [phone]

## 2019-08-19 DIAGNOSIS — Z01.812 PRE-OPERATIVE LABORATORY EXAMINATION: ICD-10-CM

## 2019-08-19 LAB
ALBUMIN SERPL BCP-MCNC: 4.2 G/DL (ref 3.2–4.9)
ALBUMIN/GLOB SERPL: 1.2 G/DL
ALP SERPL-CCNC: 72 U/L (ref 30–99)
ALT SERPL-CCNC: 10 U/L (ref 2–50)
ANION GAP SERPL CALC-SCNC: 9 MMOL/L (ref 0–11.9)
APPEARANCE UR: CLEAR
AST SERPL-CCNC: 13 U/L (ref 12–45)
BACTERIA #/AREA URNS HPF: NEGATIVE /HPF
BASOPHILS # BLD AUTO: 0.7 % (ref 0–1.8)
BASOPHILS # BLD: 0.11 K/UL (ref 0–0.12)
BILIRUB SERPL-MCNC: 0.3 MG/DL (ref 0.1–1.5)
BILIRUB UR QL STRIP.AUTO: NEGATIVE
BUN SERPL-MCNC: 13 MG/DL (ref 8–22)
CALCIUM SERPL-MCNC: 9.7 MG/DL (ref 8.5–10.5)
CHLORIDE SERPL-SCNC: 102 MMOL/L (ref 96–112)
CO2 SERPL-SCNC: 26 MMOL/L (ref 20–33)
COLOR UR: YELLOW
CREAT SERPL-MCNC: 0.86 MG/DL (ref 0.5–1.4)
EOSINOPHIL # BLD AUTO: 0.18 K/UL (ref 0–0.51)
EOSINOPHIL NFR BLD: 1.1 % (ref 0–6.9)
EPI CELLS #/AREA URNS HPF: NEGATIVE /HPF
ERYTHROCYTE [DISTWIDTH] IN BLOOD BY AUTOMATED COUNT: 44.3 FL (ref 35.9–50)
GLOBULIN SER CALC-MCNC: 3.6 G/DL (ref 1.9–3.5)
GLUCOSE SERPL-MCNC: 93 MG/DL (ref 65–99)
GLUCOSE UR STRIP.AUTO-MCNC: NEGATIVE MG/DL
HCG SERPL QL: NEGATIVE
HCT VFR BLD AUTO: 40.3 % (ref 37–47)
HGB BLD-MCNC: 12.6 G/DL (ref 12–16)
HYALINE CASTS #/AREA URNS LPF: NORMAL /LPF
IMM GRANULOCYTES # BLD AUTO: 0.06 K/UL (ref 0–0.11)
IMM GRANULOCYTES NFR BLD AUTO: 0.4 % (ref 0–0.9)
KETONES UR STRIP.AUTO-MCNC: NEGATIVE MG/DL
LEUKOCYTE ESTERASE UR QL STRIP.AUTO: NEGATIVE
LYMPHOCYTES # BLD AUTO: 3.95 K/UL (ref 1–4.8)
LYMPHOCYTES NFR BLD: 23.7 % (ref 22–41)
MCH RBC QN AUTO: 28.1 PG (ref 27–33)
MCHC RBC AUTO-ENTMCNC: 31.3 G/DL (ref 33.6–35)
MCV RBC AUTO: 89.8 FL (ref 81.4–97.8)
MICRO URNS: ABNORMAL
MONOCYTES # BLD AUTO: 1.23 K/UL (ref 0–0.85)
MONOCYTES NFR BLD AUTO: 7.4 % (ref 0–13.4)
NEUTROPHILS # BLD AUTO: 11.13 K/UL (ref 2–7.15)
NEUTROPHILS NFR BLD: 66.7 % (ref 44–72)
NITRITE UR QL STRIP.AUTO: NEGATIVE
NRBC # BLD AUTO: 0 K/UL
NRBC BLD-RTO: 0 /100 WBC
PH UR STRIP.AUTO: 6.5 [PH] (ref 5–8)
PLATELET # BLD AUTO: 363 K/UL (ref 164–446)
PMV BLD AUTO: 12.8 FL (ref 9–12.9)
POTASSIUM SERPL-SCNC: 3.1 MMOL/L (ref 3.6–5.5)
PROT SERPL-MCNC: 7.8 G/DL (ref 6–8.2)
PROT UR QL STRIP: NEGATIVE MG/DL
RBC # BLD AUTO: 4.49 M/UL (ref 4.2–5.4)
RBC # URNS HPF: NORMAL /HPF
RBC UR QL AUTO: ABNORMAL
SODIUM SERPL-SCNC: 137 MMOL/L (ref 135–145)
SP GR UR STRIP.AUTO: 1.01
UROBILINOGEN UR STRIP.AUTO-MCNC: 0.2 MG/DL
WBC # BLD AUTO: 16.7 K/UL (ref 4.8–10.8)
WBC #/AREA URNS HPF: NORMAL /HPF

## 2019-08-19 PROCEDURE — 81001 URINALYSIS AUTO W/SCOPE: CPT

## 2019-08-19 PROCEDURE — 36415 COLL VENOUS BLD VENIPUNCTURE: CPT

## 2019-08-19 PROCEDURE — 84703 CHORIONIC GONADOTROPIN ASSAY: CPT

## 2019-08-19 PROCEDURE — 80053 COMPREHEN METABOLIC PANEL: CPT

## 2019-08-19 PROCEDURE — 85025 COMPLETE CBC W/AUTO DIFF WBC: CPT

## 2019-08-19 RX ORDER — ACETAMINOPHEN 500 MG
500-1000 TABLET ORAL 2 TIMES DAILY PRN
COMMUNITY
End: 2020-12-28

## 2019-08-19 NOTE — OR NURSING
Patient here for pre-admit appointment. Labs drawn as ordered/protocal; abnormals faxed to Dr. Nehal Amato [phone]

## 2019-08-21 NOTE — H&P
DATE OF SCHEDULED SURGERY:  2019    CHIEF COMPLAINT:  1.  Menorrhagia.  2.  Adenomyosis.  3.  Hydrosalpinx.    PLANNED SURGERY:  Laparoscopic-assisted vaginal hysterectomy with bilateral   salpingectomy.    HISTORY OF PRESENT ILLNESS:  A 39-year-old  4, para 3 female, last   menstrual period 2019, relying on tubal sterilization for contraception.    She presents with menorrhagia, adenomyosis and hydrosalpinx as determined by   pelvic exam and pelvic ultrasound.  She has failed expectant management.    Alternative treatment includes continued expectant management.  Hormonal   contraception such as oral contraceptive pills is contraindicated due to her   hypertension and endometrial ablation is contraindicated due to thin    section scar as well as the fact that it will not treat her adenomyosis or   hydrosalpinx.  Benefits expected of the surgery include resolution of   menorrhagia and removal of hydrosalpinx.    PAST MEDICAL HISTORY:  1.  Hypertension.  2.  Cervical dysplasia.  Most recent Pap smears have been normal.    PAST SURGICAL HISTORY:  1.   section x3.  2.  Ectopic pregnancy removal.  3.  ORIF of right middle finger.  4.  LASIK surgery.  5.  Tonsillectomy.  6.  Vein ablation.    ALLERGIES:  AUGMENTIN CAUSING A RASH.    CURRENT MEDICATIONS:  Valtrex 500 mg and metoprolol/hydrochlorothiazide.    FAMILY HISTORY:  Significant for hypertension, stroke, coronary artery   disease, osteoporosis and kidney cancer.    REPRODUCTIVE HISTORY:  Three prior  sections.  She has had an ectopic   pregnancy with surgical treatment and tubal ligation.    SOCIAL HISTORY:  She is .  She is employed as a registered nurse.  She   has 3 children.    REVIEW OF SYSTEMS:  Significant for heavy menstrual bleeding.    PHYSICAL EXAMINATION:  VITAL SIGNS:  Blood pressure 140/80, height 5 feet 3 inches, weight 208, body   mass index 36.89.  GENERAL:  Well-developed, well-nourished female in  no acute distress.  HEENT:  Normocephalic, atraumatic.  Eyes, ears, nose, throat grossly within   normal limits.  NECK:  No thyromegaly.  LUNGS:  Clear.  HEART:  Regular rate and rhythm without murmur.  ABDOMEN:  Nontender, no hepatosplenomegaly.  PELVIC:  External genitalia normal.  Vagina, no discharge.  Cervix is   nulliparous, no lesion.  Uterus is midline, mobile, nontender, normal size.    Adnexa, no mass or tenderness.    IMAGING:  Transvaginal ultrasound identifies what appears to be a hydrosalpinx   on the right side.  The uterus is enlarged and heterogeneous.    PLAN:  The patient will undergo laparoscopic-assisted total vaginal   hysterectomy with bilateral salpingectomy or at least removal of any residual   fallopian tube tissue bilaterally.  She will also have a postoperative   cystoscopy.  We have discussed alternatives, benefits and risks of planned   surgery and the option of obtaining a second opinion.    I have discussed the anesthetic risks including the risk of allergic reaction,   aspiration pneumonia, heart attack, stroke, death and the need for n.p.o.   status.    I have discussed generalized surgical risks including the risk of surgical   site wound infection, vascular injury, bleeding, hemorrhage and need for   transfusion.  I have discussed the risk of bowel, bladder or ureteral injury   and potential for additional surgical procedures to repair any such injury.  I   have discussed risk of postoperative adhesion formation, chronic pain,   dyspareunia, risk of deep vein thrombosis, pulmonary embolism or death.    Specific to laparoscopic approach, I have discussed risks associated with   placement of Veress needle and primary port, particularly risk of inadvertent   injury to bowel or vascular structures since the ports are placed by sense of   feel and anatomic variations or adhesions could increase risk of inadvertent   injury.    I have discussed specific hysterectomy risks including the  fact that the   greatest risks with hysterectomy are injury to bowel, bladder and ureter and   that an injury of such could require additional surgery in the future for   repair or management.  I have also discussed the potential for needing to   convert the surgery from planned approach to an abdominal approach.  I have   also discussed the option of a planned abdominal approach.  After discussing   the risks, benefits and alternatives of surgery and the scheduled procedure,   she wishes to proceed with surgery.    Postoperative pain management was discussed and she was given prescription for   Percocet 5/325, 8 tablets, no refill; ibuprofen 600 mg, 30 tablets, 1 refill   and Zofran 4 mg 8 tablets with 1 refill.  I have checked the [facility] aware   website for prior narcotic prescriptions and I have determined she is a   candidate for postoperative narcotic pain management.  She has completed the   patient risk assessment, which I have reviewed and I have reviewed her past   medical records.  I have discussed the risk of narcotic use including the risk   of abuse, dependence and addiction and explained that following major surgery   the expectation is that narcotic use will be for short-term pain management   and she should rapidly transition to nonnarcotic forms of pain management.    She is advised not to share or sell any unused narcotic.  We have discussed   proper use, storage and disposal.  I feel that the substance I am prescribing   is medically necessary and appropriate.       ____________________________________     MD IRIS OROSCO / MALI    DD:  08/20/2019 18:52:33  DT:  08/20/2019 19:07:54    D#:  [id number]  Job#:  [id number]

## 2019-08-22 ENCOUNTER — ANESTHESIA EVENT (OUTPATIENT)
Dept: SURGERY | Facility: MEDICAL CENTER | Age: 39
End: 2019-08-22
Payer: COMMERCIAL

## 2019-08-22 ENCOUNTER — ANESTHESIA (OUTPATIENT)
Dept: SURGERY | Facility: MEDICAL CENTER | Age: 39
End: 2019-08-22
Payer: COMMERCIAL

## 2019-08-22 ENCOUNTER — HOSPITAL ENCOUNTER (OUTPATIENT)
Facility: MEDICAL CENTER | Age: 39
End: 2019-08-22
Attending: OBSTETRICS & GYNECOLOGY | Admitting: OBSTETRICS & GYNECOLOGY
Payer: COMMERCIAL

## 2019-08-22 VITALS
DIASTOLIC BLOOD PRESSURE: 71 MMHG | HEART RATE: 73 BPM | SYSTOLIC BLOOD PRESSURE: 120 MMHG | WEIGHT: 207.89 LBS | RESPIRATION RATE: 16 BRPM | BODY MASS INDEX: 36.84 KG/M2 | HEIGHT: 63 IN | OXYGEN SATURATION: 98 % | TEMPERATURE: 97.8 F

## 2019-08-22 LAB
B-HCG FREE SERPL-ACNC: <5 MIU/ML
IHCGL IHCGL: NEGATIVE MIU/ML
PATHOLOGY CONSULT NOTE: NORMAL

## 2019-08-22 PROCEDURE — A6402 STERILE GAUZE <= 16 SQ IN: HCPCS | Performed by: OBSTETRICS & GYNECOLOGY

## 2019-08-22 PROCEDURE — A9270 NON-COVERED ITEM OR SERVICE: HCPCS | Performed by: OBSTETRICS & GYNECOLOGY

## 2019-08-22 PROCEDURE — A4338 INDWELLING CATHETER LATEX: HCPCS | Performed by: OBSTETRICS & GYNECOLOGY

## 2019-08-22 PROCEDURE — 502704 HCHG DEVICE, LIGASURE IMPACT: Performed by: OBSTETRICS & GYNECOLOGY

## 2019-08-22 PROCEDURE — 700101 HCHG RX REV CODE 250: Performed by: ANESTHESIOLOGY

## 2019-08-22 PROCEDURE — 700105 HCHG RX REV CODE 258: Performed by: ANESTHESIOLOGY

## 2019-08-22 PROCEDURE — 160048 HCHG OR STATISTICAL LEVEL 1-5: Performed by: OBSTETRICS & GYNECOLOGY

## 2019-08-22 PROCEDURE — 700101 HCHG RX REV CODE 250: Performed by: OBSTETRICS & GYNECOLOGY

## 2019-08-22 PROCEDURE — 502587 HCHG PACK, D&C: Performed by: OBSTETRICS & GYNECOLOGY

## 2019-08-22 PROCEDURE — 500886 HCHG PACK, LAPAROSCOPY: Performed by: OBSTETRICS & GYNECOLOGY

## 2019-08-22 PROCEDURE — 700105 HCHG RX REV CODE 258: Performed by: OBSTETRICS & GYNECOLOGY

## 2019-08-22 PROCEDURE — A9270 NON-COVERED ITEM OR SERVICE: HCPCS | Performed by: ANESTHESIOLOGY

## 2019-08-22 PROCEDURE — 88307 TISSUE EXAM BY PATHOLOGIST: CPT

## 2019-08-22 PROCEDURE — 501582 HCHG TROCAR, THRD BLADED: Performed by: OBSTETRICS & GYNECOLOGY

## 2019-08-22 PROCEDURE — 700102 HCHG RX REV CODE 250 W/ 637 OVERRIDE(OP): Performed by: ANESTHESIOLOGY

## 2019-08-22 PROCEDURE — 700102 HCHG RX REV CODE 250 W/ 637 OVERRIDE(OP): Performed by: OBSTETRICS & GYNECOLOGY

## 2019-08-22 PROCEDURE — 160025 RECOVERY II MINUTES (STATS): Performed by: OBSTETRICS & GYNECOLOGY

## 2019-08-22 PROCEDURE — 501330 HCHG SET, CYSTO IRRIG TUBING: Performed by: OBSTETRICS & GYNECOLOGY

## 2019-08-22 PROCEDURE — 160035 HCHG PACU - 1ST 60 MINS PHASE I: Performed by: OBSTETRICS & GYNECOLOGY

## 2019-08-22 PROCEDURE — 700111 HCHG RX REV CODE 636 W/ 250 OVERRIDE (IP): Performed by: ANESTHESIOLOGY

## 2019-08-22 PROCEDURE — 160002 HCHG RECOVERY MINUTES (STAT): Performed by: OBSTETRICS & GYNECOLOGY

## 2019-08-22 PROCEDURE — 84702 CHORIONIC GONADOTROPIN TEST: CPT

## 2019-08-22 PROCEDURE — 500440 HCHG DRESSING, STERILE ROLL (KERLIX): Performed by: OBSTETRICS & GYNECOLOGY

## 2019-08-22 PROCEDURE — 160046 HCHG PACU - 1ST 60 MINS PHASE II: Performed by: OBSTETRICS & GYNECOLOGY

## 2019-08-22 PROCEDURE — 160041 HCHG SURGERY MINUTES - EA ADDL 1 MIN LEVEL 4: Performed by: OBSTETRICS & GYNECOLOGY

## 2019-08-22 PROCEDURE — 500868 HCHG NEEDLE, SURGI(VARES): Performed by: OBSTETRICS & GYNECOLOGY

## 2019-08-22 PROCEDURE — 160009 HCHG ANES TIME/MIN: Performed by: OBSTETRICS & GYNECOLOGY

## 2019-08-22 PROCEDURE — 501411 HCHG SPONGE, BABY LAP W/O RINGS: Performed by: OBSTETRICS & GYNECOLOGY

## 2019-08-22 PROCEDURE — 501838 HCHG SUTURE GENERAL: Performed by: OBSTETRICS & GYNECOLOGY

## 2019-08-22 PROCEDURE — 160029 HCHG SURGERY MINUTES - 1ST 30 MINS LEVEL 4: Performed by: OBSTETRICS & GYNECOLOGY

## 2019-08-22 PROCEDURE — 160036 HCHG PACU - EA ADDL 30 MINS PHASE I: Performed by: OBSTETRICS & GYNECOLOGY

## 2019-08-22 PROCEDURE — 160047 HCHG PACU  - EA ADDL 30 MINS PHASE II: Performed by: OBSTETRICS & GYNECOLOGY

## 2019-08-22 RX ORDER — LIDOCAINE HYDROCHLORIDE 20 MG/ML
INJECTION, SOLUTION EPIDURAL; INFILTRATION; INTRACAUDAL; PERINEURAL PRN
Status: DISCONTINUED | OUTPATIENT
Start: 2019-08-22 | End: 2019-08-22 | Stop reason: SURG

## 2019-08-22 RX ORDER — GABAPENTIN 300 MG/1
300 CAPSULE ORAL ONCE
Status: COMPLETED | OUTPATIENT
Start: 2019-08-22 | End: 2019-08-22

## 2019-08-22 RX ORDER — DIPHENHYDRAMINE HYDROCHLORIDE 50 MG/ML
12.5 INJECTION INTRAMUSCULAR; INTRAVENOUS
Status: DISCONTINUED | OUTPATIENT
Start: 2019-08-22 | End: 2019-08-22 | Stop reason: HOSPADM

## 2019-08-22 RX ORDER — SIMETHICONE 80 MG
80 TABLET,CHEWABLE ORAL EVERY 8 HOURS PRN
Status: DISCONTINUED | OUTPATIENT
Start: 2019-08-22 | End: 2019-08-22 | Stop reason: HOSPADM

## 2019-08-22 RX ORDER — HALOPERIDOL 5 MG/ML
1 INJECTION INTRAMUSCULAR
Status: DISCONTINUED | OUTPATIENT
Start: 2019-08-22 | End: 2019-08-22 | Stop reason: HOSPADM

## 2019-08-22 RX ORDER — HYDROMORPHONE HYDROCHLORIDE 1 MG/ML
0.1 INJECTION, SOLUTION INTRAMUSCULAR; INTRAVENOUS; SUBCUTANEOUS
Status: DISCONTINUED | OUTPATIENT
Start: 2019-08-22 | End: 2019-08-22 | Stop reason: HOSPADM

## 2019-08-22 RX ORDER — KETAMINE HYDROCHLORIDE 50 MG/ML
INJECTION, SOLUTION INTRAMUSCULAR; INTRAVENOUS PRN
Status: DISCONTINUED | OUTPATIENT
Start: 2019-08-22 | End: 2019-08-22 | Stop reason: SURG

## 2019-08-22 RX ORDER — SODIUM CHLORIDE, SODIUM LACTATE, POTASSIUM CHLORIDE, CALCIUM CHLORIDE 600; 310; 30; 20 MG/100ML; MG/100ML; MG/100ML; MG/100ML
INJECTION, SOLUTION INTRAVENOUS CONTINUOUS
Status: DISCONTINUED | OUTPATIENT
Start: 2019-08-22 | End: 2019-08-22 | Stop reason: HOSPADM

## 2019-08-22 RX ORDER — CELECOXIB 200 MG/1
400 CAPSULE ORAL ONCE
Status: COMPLETED | OUTPATIENT
Start: 2019-08-22 | End: 2019-08-22

## 2019-08-22 RX ORDER — ROCURONIUM BROMIDE 10 MG/ML
INJECTION, SOLUTION INTRAVENOUS PRN
Status: DISCONTINUED | OUTPATIENT
Start: 2019-08-22 | End: 2019-08-22 | Stop reason: SURG

## 2019-08-22 RX ORDER — OXYCODONE HCL 5 MG/5 ML
5 SOLUTION, ORAL ORAL
Status: COMPLETED | OUTPATIENT
Start: 2019-08-22 | End: 2019-08-22

## 2019-08-22 RX ORDER — ONDANSETRON 2 MG/ML
4 INJECTION INTRAMUSCULAR; INTRAVENOUS
Status: COMPLETED | OUTPATIENT
Start: 2019-08-22 | End: 2019-08-22

## 2019-08-22 RX ORDER — HYDROMORPHONE HYDROCHLORIDE 1 MG/ML
0.2 INJECTION, SOLUTION INTRAMUSCULAR; INTRAVENOUS; SUBCUTANEOUS
Status: DISCONTINUED | OUTPATIENT
Start: 2019-08-22 | End: 2019-08-22 | Stop reason: HOSPADM

## 2019-08-22 RX ORDER — ACETAMINOPHEN 500 MG
1000 TABLET ORAL ONCE
Status: COMPLETED | OUTPATIENT
Start: 2019-08-22 | End: 2019-08-22

## 2019-08-22 RX ORDER — MEPERIDINE HYDROCHLORIDE 25 MG/ML
6.25 INJECTION INTRAMUSCULAR; INTRAVENOUS; SUBCUTANEOUS
Status: DISCONTINUED | OUTPATIENT
Start: 2019-08-22 | End: 2019-08-22 | Stop reason: HOSPADM

## 2019-08-22 RX ORDER — HYDROMORPHONE HYDROCHLORIDE 1 MG/ML
0.4 INJECTION, SOLUTION INTRAMUSCULAR; INTRAVENOUS; SUBCUTANEOUS
Status: DISCONTINUED | OUTPATIENT
Start: 2019-08-22 | End: 2019-08-22 | Stop reason: HOSPADM

## 2019-08-22 RX ORDER — BUPIVACAINE HYDROCHLORIDE AND EPINEPHRINE 2.5; 5 MG/ML; UG/ML
INJECTION, SOLUTION EPIDURAL; INFILTRATION; INTRACAUDAL; PERINEURAL
Status: DISCONTINUED | OUTPATIENT
Start: 2019-08-22 | End: 2019-08-22 | Stop reason: HOSPADM

## 2019-08-22 RX ORDER — MIDAZOLAM HYDROCHLORIDE 1 MG/ML
INJECTION INTRAMUSCULAR; INTRAVENOUS PRN
Status: DISCONTINUED | OUTPATIENT
Start: 2019-08-22 | End: 2019-08-22 | Stop reason: SURG

## 2019-08-22 RX ORDER — CEFAZOLIN SODIUM 1 G/3ML
INJECTION, POWDER, FOR SOLUTION INTRAMUSCULAR; INTRAVENOUS PRN
Status: DISCONTINUED | OUTPATIENT
Start: 2019-08-22 | End: 2019-08-22 | Stop reason: SURG

## 2019-08-22 RX ORDER — DEXAMETHASONE SODIUM PHOSPHATE 4 MG/ML
INJECTION, SOLUTION INTRA-ARTICULAR; INTRALESIONAL; INTRAMUSCULAR; INTRAVENOUS; SOFT TISSUE PRN
Status: DISCONTINUED | OUTPATIENT
Start: 2019-08-22 | End: 2019-08-22 | Stop reason: SURG

## 2019-08-22 RX ORDER — CEFAZOLIN SODIUM 2 G/100ML
2 INJECTION, SOLUTION INTRAVENOUS
Status: DISCONTINUED | OUTPATIENT
Start: 2019-08-22 | End: 2019-08-22 | Stop reason: HOSPADM

## 2019-08-22 RX ORDER — OXYCODONE HCL 5 MG/5 ML
10 SOLUTION, ORAL ORAL
Status: COMPLETED | OUTPATIENT
Start: 2019-08-22 | End: 2019-08-22

## 2019-08-22 RX ORDER — BUPIVACAINE HYDROCHLORIDE AND EPINEPHRINE 2.5; 5 MG/ML; UG/ML
INJECTION, SOLUTION EPIDURAL; INFILTRATION; INTRACAUDAL; PERINEURAL
Status: DISCONTINUED
Start: 2019-08-22 | End: 2019-08-22 | Stop reason: HOSPADM

## 2019-08-22 RX ORDER — MIDAZOLAM HYDROCHLORIDE 1 MG/ML
1 INJECTION INTRAMUSCULAR; INTRAVENOUS
Status: DISCONTINUED | OUTPATIENT
Start: 2019-08-22 | End: 2019-08-22 | Stop reason: HOSPADM

## 2019-08-22 RX ADMIN — CELECOXIB 400 MG: 200 CAPSULE ORAL at 07:17

## 2019-08-22 RX ADMIN — MIDAZOLAM 2 MG: 1 INJECTION INTRAMUSCULAR; INTRAVENOUS at 07:41

## 2019-08-22 RX ADMIN — FENTANYL CITRATE 25 MCG: 0.05 INJECTION, SOLUTION INTRAMUSCULAR; INTRAVENOUS at 10:47

## 2019-08-22 RX ADMIN — ONDANSETRON 4 MG: 2 INJECTION INTRAMUSCULAR; INTRAVENOUS at 09:46

## 2019-08-22 RX ADMIN — GABAPENTIN 300 MG: 300 CAPSULE ORAL at 07:16

## 2019-08-22 RX ADMIN — ACETAMINOPHEN 1000 MG: 500 TABLET ORAL at 07:16

## 2019-08-22 RX ADMIN — FLUORESCEIN SODIUM 0.5 ML: 100 INJECTION INTRAVENOUS at 09:16

## 2019-08-22 RX ADMIN — DEXAMETHASONE SODIUM PHOSPHATE 8 MG: 4 INJECTION, SOLUTION INTRA-ARTICULAR; INTRALESIONAL; INTRAMUSCULAR; INTRAVENOUS; SOFT TISSUE at 07:52

## 2019-08-22 RX ADMIN — ROCURONIUM BROMIDE 50 MG: 10 INJECTION, SOLUTION INTRAVENOUS at 07:45

## 2019-08-22 RX ADMIN — FENTANYL CITRATE 50 MCG: 50 INJECTION, SOLUTION INTRAMUSCULAR; INTRAVENOUS at 07:45

## 2019-08-22 RX ADMIN — CEFAZOLIN 3 G: 330 INJECTION, POWDER, FOR SOLUTION INTRAMUSCULAR; INTRAVENOUS at 07:49

## 2019-08-22 RX ADMIN — LIDOCAINE HYDROCHLORIDE 60 ML: 20 INJECTION, SOLUTION EPIDURAL; INFILTRATION; INTRACAUDAL; PERINEURAL at 07:45

## 2019-08-22 RX ADMIN — EPHEDRINE SULFATE 10 MG: 50 INJECTION, SOLUTION INTRAVENOUS at 08:13

## 2019-08-22 RX ADMIN — KETAMINE HYDROCHLORIDE 0.2 MCG/KG/MIN: 50 INJECTION, SOLUTION INTRAMUSCULAR; INTRAVENOUS at 07:51

## 2019-08-22 RX ADMIN — SODIUM CHLORIDE, POTASSIUM CHLORIDE, SODIUM LACTATE AND CALCIUM CHLORIDE: 600; 310; 30; 20 INJECTION, SOLUTION INTRAVENOUS at 07:15

## 2019-08-22 RX ADMIN — KETAMINE HYDROCHLORIDE 50 MG: 50 INJECTION, SOLUTION INTRAMUSCULAR; INTRAVENOUS at 07:50

## 2019-08-22 RX ADMIN — PROPOFOL 200 MG: 10 INJECTION, EMULSION INTRAVENOUS at 07:45

## 2019-08-22 RX ADMIN — OXYCODONE HYDROCHLORIDE 5 MG: 5 SOLUTION ORAL at 13:48

## 2019-08-22 RX ADMIN — OXYCODONE HYDROCHLORIDE 5 MG: 5 SOLUTION ORAL at 11:48

## 2019-08-22 RX ADMIN — FENTANYL CITRATE 50 MCG: 50 INJECTION, SOLUTION INTRAMUSCULAR; INTRAVENOUS at 09:10

## 2019-08-22 RX ADMIN — FENTANYL CITRATE 25 MCG: 0.05 INJECTION, SOLUTION INTRAMUSCULAR; INTRAVENOUS at 10:41

## 2019-08-22 ASSESSMENT — PAIN SCALES - GENERAL: PAIN_LEVEL: 4

## 2019-08-22 NOTE — ANESTHESIA PROCEDURE NOTES
Airway  Date/Time: 8/22/2019 7:46 AM  Performed by: Ana Paula Palencia M.D.  Authorized by: Ana Paula Palencia M.D.     Location:  OR  Urgency:  Elective  Difficult Airway: No    Indications for Airway Management:  Anesthesia  Spontaneous Ventilation: absent    Sedation Level:  Deep  Preoxygenated: Yes    Patient Position:  Sniffing  Mask Difficulty Assessment:  1 - vent by mask  Final Airway Type:  Endotracheal airway  Final Endotracheal Airway:  ETT  Cuffed: Yes    Technique Used for Successful ETT Placement:  Direct laryngoscopy  Insertion Site:  Oral  Blade Type:  Rolando  Laryngoscope Blade/Videolaryngoscope Blade Size:  3  ETT Size (mm):  7.0  Measured from:  Teeth  ETT to Teeth (cm):  21  Placement Verified by: auscultation and capnometry    Cormack-Lehane Classification:  Grade IIa - partial view of glottis  Number of Attempts at Approach:  1  Number of Other Approaches Attempted:  0

## 2019-08-22 NOTE — ANESTHESIA QCDR
2019 [facility] Clinical Data Registry (for Quality Improvement)     Postoperative nausea/vomiting risk protocol (Adult = 18 yrs and Pediatric 3-17 yrs)- (430 and 463)  General inhalation anesthetic (NOT TIVA) with PONV risk factors: Yes  Provision of anti-emetic therapy with at least 2 different classes of agents: Yes   Patient DID NOT receive anti-emetic therapy and reason is documented in Medical Record:  N/A    Multimodal Pain Management- (AQI59)  Patient undergoing Elective Surgery (i.e. Outpatient, or ASC, or Prescheduled Surgery prior to Hospital Admission): Yes  Use of Multimodal Pain Management, two or more drugs and/or interventions, NOT including systemic opioids: Yes   Exception: Documented allergy to multiple classes of analgesics:  N/A    PACU assessment of acute postoperative pain prior to Anesthesia Care End- Applies to Patients Age = 18- (ABG7)  Initial PACU pain score is which of the following: < 7/10  Patient unable to report pain score: N/A    Post-anesthetic transfer of care checklist/protocol to PACU/ICU- (426 and 427)  Upon conclusion of case, patient transferred to which of the following locations: PACU/Non-ICU  Use of transfer checklist/protocol: Yes  Exclusion: Service Performed in Patient Hospital Room (and thus did not require transfer): N/A    PACU Reintubation- (AQI31)  General anesthesia requiring endotracheal intubation (ETT) along with subsequent extubation in OR or PACU: Yes  Required reintubation in the PACU: No   Extubation was a planned trial documented in the medical record prior to removal of the original airway device:  N/A    Unplanned admission to ICU related to anesthesia service up through end of PACU care- (MD51)  Unplanned admission to ICU (not initially anticipated at anesthesia start time): No

## 2019-08-22 NOTE — OR SURGEON
Immediate Post OP Note    PreOp Diagnosis: MENORRHAGIA, ADENOMYOSIS, RIGHT HYDROSALPINX    PostOp Diagnosis: MENORRHAGIA, ADENOMYOSIS, RIGHT HYDROSALPINX    Procedure(s):  HYSTERECTOMY, TOTAL, VAGINAL, LAPAROSCOPY-ASSISTED - Wound Class: Clean Contaminated  BILATERAL SALPINGECTOMY - Wound Class: Clean Contaminated  CYSTOSCOPY - Wound Class: Clean Contaminated    Surgeon(s):  Nehal Amato M.D.    Assistant surgeon:   Raquel Martinez M.D.    Anesthesiologist/Type of Anesthesia:  Anesthesiologist: Ana Paula Palencia M.D./General    Surgical Staff:  Circulator: Dory Liao R.N.  Relief Circulator: Edgar Sanchez R.N.  Scrub Person: Annamarie Rashid    Specimens removed if any:  ID Type Source Tests Collected by Time Destination   A : uterus, cervix, bilateral tubes Tissue Uterus PATHOLOGY SPECIMEN Nehal Amato M.D. 8/22/2019  8:26 AM        Estimated Blood Loss: 75cc    Findings: normal ovaries bilaterally, fallopian tubes s/p bilateral interruption,distal right hydrosalpinx, left paratubal cyst. On cystoscopy - intact bladder, strong jets of urine from the ureters bilaterally    Complications: none        8/22/2019 10:01 AM Nehal Amato M.D.

## 2019-08-22 NOTE — OR NURSING
"0937 Patient arrived from OR on [facility]. Report received from RN and Anesthesia. Patient's VS WNL, patient arousable, stable, breathing even/non labored. Dressings CDI, major in.   0946 Zofran IV given as per Dr. Palencia's request.   1014 Major catheter removed as ordered.   1051 Pt's  at bedside.   1054 Pt tolerating sips of water.  1130 Pt states she is feeling nauseated \"on and off\", queaseease with pt, refusing nausea medicine at this time.   1148 Pt tolerating jello. Pt says she is ready for pain medicine, requested 5 mg of oxycodone.  1239 Pt meets criteria for phase 2.   1310 pt to bathroom, voided. Up on recliner chair.   1320 Hand-off to RAFI Damico for lunch coverage.  1415 pt up to bathroom, voided. Small amount of drainage on peripad.    1422 Patient verbalized readiness to go home. Discharge instructions discussed with patient and , copy given. Patient verbalized understanding to instructions.Belongings with patient.   1430 Discharge criteria met. PIV out, Discharged via wheelchair.        "

## 2019-08-22 NOTE — ANESTHESIA POSTPROCEDURE EVALUATION
Patient: Halle Vu Ron    Procedure Summary     Date:  08/22/19 Room / Location:  [facility] ROOM 23 / SURGERY SAME DAY [facility] [place]    Anesthesia Start:  0741 Anesthesia Stop:  0939    Procedures:       HYSTERECTOMY, TOTAL, VAGINAL, LAPAROSCOPY-ASSISTED (N/A )      SALPINGECTOMY (Bilateral )      CYSTOSCOPY (N/A ) Diagnosis:  (MENORRHAGIA, ADENOMYOSIS)    Surgeon:  Nehal Amato M.D. Responsible Provider:  Ana Paula Palencia M.D.    Anesthesia Type:  general ASA Status:  2          Final Anesthesia Type: general  Last vitals  BP   Blood Pressure: 112/60    Temp   36.2 °C (97.2 °F)    Pulse   Pulse: 70, Heart Rate (Monitored): 84   Resp   18    SpO2   99 %      Anesthesia Post Evaluation    Patient location during evaluation: PACU  Patient participation: complete - patient participated  Level of consciousness: awake and alert  Pain score: 4    Airway patency: patent  Anesthetic complications: no  Cardiovascular status: hemodynamically stable  Respiratory status: acceptable  Hydration status: euvolemic    PONV: none           Nurse Pain Score: 5 (NPRS)

## 2019-08-22 NOTE — OR NURSING
1320 assumed care for rn break. Pt is resting comfortable still feels sleepy and not ready to dc to home yet.   1350 remedicated 5 mg oxycodone report back to Meredith walker

## 2019-08-22 NOTE — ANESTHESIA TIME REPORT
Anesthesia Start and Stop Event Times     Date Time Event    8/22/2019 0709 Ready for Procedure     0741 Anesthesia Start     0939 Anesthesia Stop        Responsible Staff  08/22/19    Name Role Begin End    Ana Paula Palencia M.D. Anesth 0741 0939        Preop Diagnosis (Free Text):  Pre-op Diagnosis     MENORRHAGIA, ADENOMYOSIS        Preop Diagnosis (Codes):    Post op Diagnosis  Menorrhagia      Premium Reason  Non-Premium    Comments:

## 2019-08-22 NOTE — ANESTHESIA PREPROCEDURE EVALUATION
Relevant Problems   CARDIAC   (+) Hypertension       Physical Exam    Airway   Mallampati: II  TM distance: >3 FB  Neck ROM: full       Cardiovascular - normal exam  Rhythm: regular  Rate: normal  (-) murmur     Dental - normal exam         Pulmonary - normal exam  Breath sounds clear to auscultation     Abdominal - normal exam     Neurological - normal exam                 Anesthesia Plan    ASA 2       Plan - general       Airway plan will be ETT      Plan Factors:   Patient did not smoke on day of procedure.    Induction: intravenous    Postoperative Plan: Postoperative administration of opioids is intended.    Pertinent diagnostic labs and testing reviewed    Informed Consent:    Anesthetic plan and risks discussed with patient.    Use of blood products discussed with: patient whom.

## 2019-08-22 NOTE — DISCHARGE INSTRUCTIONS
ACTIVITY: Rest and take it easy for the first 24 hours.  A responsible adult is recommended to remain with you during that time.  It is normal to feel sleepy.  We encourage you to not do anything that requires balance, judgment or coordination.    MILD FLU-LIKE SYMPTOMS ARE NORMAL. YOU MAY EXPERIENCE GENERALIZED MUSCLE ACHES, THROAT IRRITATION, HEADACHE AND/OR SOME NAUSEA.    FOR 24 HOURS DO NOT:  Drive, operate machinery or run household appliances.  Drink beer or alcoholic beverages.   Make important decisions or sign legal documents.    SPECIAL INSTRUCTIONS: see hand-out. No intercourse/douche/lifting over 20 lbs.     DIET: To avoid nausea, slowly advance diet as tolerated, avoiding spicy or greasy foods for the first day.  Add more substantial food to your diet according to your physician's instructions.  Babies can be fed formula or breast milk as soon as they are hungry.  INCREASE FLUIDS AND FIBER TO AVOID CONSTIPATION.    SURGICAL DRESSING/BATHING: May shower tomorrow, no tub baths/hot tubs/swimming until cleared by doctor.     FOLLOW-UP APPOINTMENT:  A follow-up appointment should be arranged with your doctor ; call to schedule.    You should CALL YOUR PHYSICIAN if you develop:  Fever greater than 101 degrees F.  Pain not relieved by medication, or persistent nausea or vomiting.  Excessive bleeding (blood soaking through dressing) or unexpected drainage from the wound.  Extreme redness or swelling around the incision site, drainage of pus or foul smelling drainage.  Inability to urinate or empty your bladder within 8 hours.  Problems with breathing or chest pain.    You should call 911 if you develop problems with breathing or chest pain.  If you are unable to contact your doctor or surgical center, you should go to the nearest emergency room or urgent care center.  Physician's telephone #: DR. Amato [phone]    If any questions arise, call your doctor.  If your doctor is not available, please feel free  to call the Surgical Center at [phone].  The Center is open Monday through Friday from 7AM to 7PM.  You can also call the HEALTH HOTLINE open 24 hours/day, 7 days/week and speak to a nurse at [phone], or toll free at [phone].    A registered nurse may call you a few days after your surgery to see how you are doing after your procedure.    MEDICATIONS: Resume taking daily medication.  Take prescribed pain medication with food.  If no medication is prescribed, you may take non-aspirin pain medication if needed.  PAIN MEDICATION CAN BE VERY CONSTIPATING.  Take a stool softener or laxative such as senokot, pericolace, or milk of magnesia if needed.    Prescription given for given preop.  Last pain medication given at 11:48 am.    If your physician has prescribed pain medication that includes Acetaminophen (Tylenol), do not take additional Acetaminophen (Tylenol) while taking the prescribed medication.    Depression / Suicide Risk    As you are discharged from this [facility] facility, it is important to learn how to keep safe from harming yourself.    Recognize the warning signs:  · Abrupt changes in personality, positive or negative- including increase in energy   · Giving away possessions  · Change in eating patterns- significant weight changes-  positive or negative  · Change in sleeping patterns- unable to sleep or sleeping all the time   · Unwillingness or inability to communicate  · Depression  · Unusual sadness, discouragement and loneliness  · Talk of wanting to die  · Neglect of personal appearance   · Rebelliousness- reckless behavior  · Withdrawal from people/activities they love  · Confusion- inability to concentrate     If you or a loved one observes any of these behaviors or has concerns about self-harm, here's what you can do:  · Talk about it- your feelings and reasons for harming yourself  · Remove any means that you might use to hurt yourself (examples: pills, rope, extension  cords, firearm)  · Get professional help from the community (Mental Health, Substance Abuse, psychological counseling)  · Do not be alone:Call your Safe Contact- someone whom you trust who will be there for you.  · Call your local CRISIS HOTLINE 231-9376 or 659-794-9263  · Call your local Children's Mobile Crisis Response Team [place] (821) 928-4738 or www.[facility]  · Call the toll free National Suicide Prevention Hotlines   · National Suicide Prevention Lifeline 218-717-JTAC (2844)  · National Hope Line Network 800-SUICIDE (208-8843)

## 2019-08-22 NOTE — OP REPORT
DATE OF SERVICE:  2019    PREOPERATIVE DIAGNOSES:  1.  Menorrhagia.  2.  Adenomyosis.  3.  Right hydrosalpinx.    POSTOPERATIVE DIAGNOSES:  1.  Menorrhagia.  2.  Adenomyosis.  3.  Right hydrosalpinx.    PROCEDURES PERFORMED:  Laparoscopic-assisted total vaginal hysterectomy with   bilateral salpingectomy and postoperative cystoscopy.    SURGEON:  Nehal Amato MD    ASSISTANT:  Raquel Martinez MD    ANESTHESIOLOGIST:  Ana Paula Palencia MD    ANESTHESIA:  General endotracheal.    COMPLICATIONS:  None.    ESTIMATED BLOOD LOSS:  75 mL    FINDINGS:  Normal ovaries bilaterally, fallopian tubes, status post prior   bilateral tubal interruption.  There is a distal right hydrosalpinx and a left   paratubal cyst.  On cystoscopy, the bladder was intact and there were strong   jets of urine from the ureters bilaterally.    INDICATION FOR PROCEDURE:  The patient is a 39-year-old  4, para 3   female, prior  section x3, prior ectopic pregnancy treatment x1.  She   suffers menorrhagia and adenomyosis and is noted to have a right hydrosalpinx.    She wishes definitive surgical management in the form of hysterectomy.    DETAILS OF PROCEDURE:  The patient was taken to the operating room where she   was placed in dorsal supine position.  After induction of general endotracheal   anesthesia, she was repositioned in a dorsal lithotomy position.  Abdomen,   perineum, and vagina were prepped and draped in the usual sterile fashion.  A   time-out was called.  The correct patient and procedure was confirmed.  I then   placed a Woodward catheter myself.  Talihina speculum was placed into the vagina.    The cervix was grasped with a single-tooth tenaculum.  The uterus was   sounded to 9 cm.  An 8 cm Moon uterine manipulator was placed.  The speculum   and tenaculum were then removed and 's gloves were changed.  Attention   was directed to the abdomen.  Umbilical site selected, infiltrated with 0.25%   Marcaine with  epinephrine.  An incision was made.  A Veress needle inserted.    Once correct intraperitoneal placement was confirmed, the abdomen was   insufflated.  The Veress needle was then removed and 11 mm trocar and port was   inserted and through this, a laparoscope was placed.  A suprapubic site was   selected and infiltrated with 0.25% Marcaine with epinephrine.  An incision   was made and under direct visualization, a 5 mm trocar and port was inserted   and through this, a Prestige grasper was placed.  The above-mentioned findings   were noted.    The right distal fallopian tube was grasped and the mesosalpinx cauterized and   transected.  The distal fallopian tube was removed through the umbilical   port.  The uteroovarian ligament on the right was cauterized and transected   using a LigaSure bipolar cutting device.  The round ligament was cauterized   and transected.  The anterior leaf of the broad ligament was incised down to   the midline.  The uterine vessels on the right were skeletonized.  Attention   directed to the left side where the left distal fallopian tube was grasped and   elevated, and the mesosalpinx was cauterized and transected, and the   fallopian tube remnant was removed through the umbilical port.  The   uteroovarian ligament on the left was cauterized and transected, the round   ligament was cauterized and transected, and the anterior leaf of the broad   ligament was incised down to the midline.  Bladder flap was fully created and   the vessels on the left were skeletonized.  The uterine vessels bilaterally   were cauterized and then transected.  Hemostasis was noted and attention   directed to the vaginal portion.    The Moon uterine manipulator was removed.  A weighted speculum was placed into   the vagina.  The cervix was grasped with a single-tooth tenaculum.  The   cervix was circumferentially infiltrated with 0.25% Marcaine with epinephrine.    A circumferential incision was made with  electrocautery.  Sharp dissection   was utilized to enter the posterior cul-de-sac, which was secured with an   interrupted suture of 0 Vicryl.  A long-weighted speculum was placed.  The   anterior vaginal mucosa was grasped with an Allis clamp and sharp dissection   was utilized to enter anteriorly and a Jaime retractor was placed.  The   uterosacral ligaments bilaterally were clamped, cut and suture ligated;   cardinal ligaments bilaterally were clamped, cut and suture ligated; and the   remaining broad ligament pedicles were clamped, cut and suture ligated, and   the uterus was removed from the vaginal cuff.  Hemostasis was identified.  The   vaginal cuff was closed with running locked suture of 0 Vicryl.  A sponge   stick was placed in the vagina and other instruments were removed.    's gloves were changed and attention redirected to the laparoscopic   portion.  Laparoscope was reintroduced.  Pelvis was thoroughly irrigated.    Hemostasis was noted.  Karla hemostatic powder was applied and remained white   and dry.  The abdomen was desufflated.  Instruments were removed.  Umbilical   fascia closed with interrupted suture of 0 Vicryl and the skin incisions were   closed with 4-0 Vicryl subcuticular stitch.  The patient was administered   intravenous fluorescein dye.  Woodward catheter was removed.  Cystoscopy was   performed.  Strong jets of urine were noted bilaterally.  Woodward catheter was   then reintroduced.  The patient tolerated the procedure well and went to   recovery area, extubated, in stable condition with her Woodward catheter draining   fluorescein-stained urine.  All sponge, needle, and instrument counts were   correct x2 and the specimen, which is the uterus attached, cervix and   bilateral distal fallopian tubes were sent to pathology for routine   evaluation.       ____________________________________     MD IRIS OROSCO / MALI    DD:  08/22/2019 10:14:46  DT:  08/22/2019  10:36:20    D#:  [id number]  Job#:  [id number]    cc: Marcelina Richard MD, CHANI EASON MD

## 2019-09-18 ENCOUNTER — HOSPITAL ENCOUNTER (OUTPATIENT)
Dept: LAB | Facility: MEDICAL CENTER | Age: 39
End: 2019-09-18
Payer: COMMERCIAL

## 2019-09-18 LAB
BDY FAT % MEASURED: 43.4 %
BP DIAS: 92 MMHG
BP SYS: 133 MMHG
CHOLEST SERPL-MCNC: 216 MG/DL (ref 100–199)
DIABETES HTDIA: NO
EVENT NAME HTEVT: NORMAL
FASTING HTFAS: YES
GLUCOSE SERPL-MCNC: 92 MG/DL (ref 65–99)
HDLC SERPL-MCNC: 45 MG/DL
HYPERTENSION HTHYP: YES
LDLC SERPL CALC-MCNC: 145 MG/DL
SCREENING LOC CITY HTCIT: NORMAL
SCREENING LOC STATE HTSTA: NORMAL
SCREENING LOCATION HTLOC: NORMAL
SMOKING HTSMO: NO
SUBSCRIBER ID HTSID: NORMAL
TRIGL SERPL-MCNC: 132 MG/DL (ref 0–149)

## 2019-09-18 PROCEDURE — 36415 COLL VENOUS BLD VENIPUNCTURE: CPT

## 2019-09-18 PROCEDURE — 82947 ASSAY GLUCOSE BLOOD QUANT: CPT

## 2019-09-18 PROCEDURE — S5190 WELLNESS ASSESSMENT BY NONPH: HCPCS

## 2019-09-18 PROCEDURE — 80061 LIPID PANEL: CPT

## 2019-09-30 ENCOUNTER — IMMUNIZATION (OUTPATIENT)
Dept: OCCUPATIONAL MEDICINE | Facility: CLINIC | Age: 39
End: 2019-09-30

## 2019-09-30 DIAGNOSIS — Z23 NEED FOR VACCINATION: ICD-10-CM

## 2019-09-30 PROCEDURE — 90686 IIV4 VACC NO PRSV 0.5 ML IM: CPT | Performed by: PREVENTIVE MEDICINE

## 2019-11-20 ENCOUNTER — HOSPITAL ENCOUNTER (OUTPATIENT)
Dept: RADIOLOGY | Facility: MEDICAL CENTER | Age: 39
End: 2019-11-20
Attending: OBSTETRICS & GYNECOLOGY
Payer: COMMERCIAL

## 2019-11-20 DIAGNOSIS — Z12.31 VISIT FOR SCREENING MAMMOGRAM: ICD-10-CM

## 2019-11-20 PROCEDURE — 77063 BREAST TOMOSYNTHESIS BI: CPT

## 2020-01-20 ENCOUNTER — OFFICE VISIT (OUTPATIENT)
Dept: MEDICAL GROUP | Facility: PHYSICIAN GROUP | Age: 40
End: 2020-01-20
Payer: COMMERCIAL

## 2020-01-20 VITALS
OXYGEN SATURATION: 96 % | HEIGHT: 63 IN | HEART RATE: 84 BPM | BODY MASS INDEX: 35.93 KG/M2 | WEIGHT: 202.8 LBS | RESPIRATION RATE: 16 BRPM | TEMPERATURE: 98.6 F | SYSTOLIC BLOOD PRESSURE: 110 MMHG | DIASTOLIC BLOOD PRESSURE: 74 MMHG

## 2020-01-20 DIAGNOSIS — E78.00 PURE HYPERCHOLESTEROLEMIA: ICD-10-CM

## 2020-01-20 DIAGNOSIS — E66.9 OBESITY (BMI 35.0-39.9 WITHOUT COMORBIDITY): ICD-10-CM

## 2020-01-20 DIAGNOSIS — I10 ESSENTIAL HYPERTENSION: ICD-10-CM

## 2020-01-20 PROBLEM — N94.3 PREMENSTRUAL SYNDROME: Status: RESOLVED | Noted: 2017-11-14 | Resolved: 2020-01-20

## 2020-01-20 PROCEDURE — 99214 OFFICE O/P EST MOD 30 MIN: CPT | Performed by: FAMILY MEDICINE

## 2020-01-20 RX ORDER — TRIAMTERENE AND HYDROCHLOROTHIAZIDE 37.5; 25 MG/1; MG/1
1 CAPSULE ORAL EVERY MORNING
Qty: 90 CAP | Refills: 3 | Status: SHIPPED | OUTPATIENT
Start: 2020-01-20 | End: 2021-02-09 | Stop reason: SDUPTHER

## 2020-01-20 ASSESSMENT — PATIENT HEALTH QUESTIONNAIRE - PHQ9: CLINICAL INTERPRETATION OF PHQ2 SCORE: 0

## 2020-01-20 NOTE — LETTER
[facility]  Marcelina Richard M.D.  [address]  [place] [address]  Fax: [phone]   Authorization for Release/Disclosure of   Protected Health Information   Name: HALLE VELASQUEZ : 1980 SSN: [id number]   Address: [address] [place] [address] [address]  [place] [address] Phone:    [phone] (home) [phone] (work)   I authorize the entity listed below to release/disclose the PHI below to:   [facility]/Marcelina Richard M.D. and Marcelina Richard M.D.   Provider or Entity Name:  Dr. Amato   Address   City, [facility], [facility]   Phone:      Fax:     Reason for request: continuity of care   Information to be released:    [  ] LAST COLONOSCOPY,  including any PATH REPORT and follow-up  [  ] LAST FIT/COLOGUARD RESULT [  ] LAST DEXA  [  ] LAST MAMMOGRAM  [X] LAST PAP  [  ] LAST LABS [  ] RETINA EXAM REPORT  [  ] IMMUNIZATION RECORDS  [  ] Release all info      [  ] Check here and initial the line next to each item to release ALL health information INCLUDING  _____ Care and treatment for drug and / or alcohol abuse  _____ HIV testing, infection status, or AIDS  _____ Genetic Testing    DATES OF SERVICE OR TIME PERIOD TO BE DISCLOSED: _____________  I understand and acknowledge that:  * This Authorization may be revoked at any time by you in writing, except if your health information has already been used or disclosed.  * Your health information that will be used or disclosed as a result of you signing this authorization could be re-disclosed by the recipient. If this occurs, your re-disclosed health information may no longer be protected by State or Federal laws.  * You may refuse to sign this Authorization. Your refusal will not affect your ability to obtain treatment.  * This Authorization becomes effective upon signing and will  on (date) __________.      If no date is indicated, this Authorization will  one (1) year from the signature date.    Name: Halle Velasquez    Signature:   Date:     1/20/2020       PLEASE FAX REQUESTED RECORDS BACK TO: [phone]

## 2020-01-20 NOTE — ASSESSMENT & PLAN NOTE
This is a chronic condition.  Current Meds: triamterene-HCTZ 37.5-25 mg qd  Side effects: none  Home BP Los-120s/60s-70s  Associated symptoms: no cp, no sob

## 2020-01-20 NOTE — ASSESSMENT & PLAN NOTE
This is a new condition.  Recent [employer] tracks screening showed elevated total cholesterol and LDL.  Today her ASCVD score is 1.2%.

## 2020-01-20 NOTE — LETTER
[facility]  Marcelina Richard M.D.  [address]  [place] [address]  Fax: [phone]   Authorization for Release/Disclosure of   Protected Health Information   Name: HALLE HUBER : 1980 SSN: [id number]   Address: [address] [place] [address] [address]  [place] [address] Phone:    [phone] (home) [phone] (work)   I authorize the entity listed below to release/disclose the PHI below to:   [facility]/Marcelina Richard M.D. and Marcelina Richard M.D.   Provider or Entity Name:  Dr.Sherry Amato    Address   City, [facility], [place], [place]  Phone:      Fax:     Reason for request: continuity of care   Information to be released:    [  ] LAST COLONOSCOPY,  including any PATH REPORT and follow-up  [  ] LAST FIT/COLOGUARD RESULT [  ] LAST DEXA  [  ] LAST MAMMOGRAM  [ X] LAST PAP  [  ] LAST LABS [  ] RETINA EXAM REPORT  [  ] IMMUNIZATION RECORDS  [  ] Release all info      [  ] Check here and initial the line next to each item to release ALL health information INCLUDING  _____ Care and treatment for drug and / or alcohol abuse  _____ HIV testing, infection status, or AIDS  _____ Genetic Testing    DATES OF SERVICE OR TIME PERIOD TO BE DISCLOSED: _____________  I understand and acknowledge that:  * This Authorization may be revoked at any time by you in writing, except if your health information has already been used or disclosed.  * Your health information that will be used or disclosed as a result of you signing this authorization could be re-disclosed by the recipient. If this occurs, your re-disclosed health information may no longer be protected by State or Federal laws.  * You may refuse to sign this Authorization. Your refusal will not affect your ability to obtain treatment.  * This Authorization becomes effective upon signing and will  on (date) __________.      If no date is indicated, this Authorization will  one (1) year from the signature date.    Name: Halle Vu  Ron    Signature:   Date:     1/20/2020       PLEASE FAX REQUESTED RECORDS BACK TO: [phone]

## 2020-01-20 NOTE — ASSESSMENT & PLAN NOTE
This is a new condition.  Onset: 8-12 months  Location: lateral corner of left eye  Quality: bump  Associated symptoms: no pain, no itching, no erythema, no drainage

## 2020-01-20 NOTE — PROGRESS NOTES
"Subjective:     CC: f/u BP, lump    HPI:   Halle presents today with     Obesity (BMI 35.0-39.9 without comorbidity) (HCC)  This is a chronic condition.  Max weight: 215 lbs (2017)  Current weight: 202.8 lbs  Weight change: -5 lbs since 2019  Diet: making healthier choices  Exercise: gym at least 2x/week      Hypertension  This is a chronic condition.  Current Meds: triamterene-HCTZ 37.5-25 mg qd  Side effects: none  Home BP Los-120s/60s-70s  Associated symptoms: no cp, no sob      Pure hypercholesterolemia  This is a new condition.  Recent [employer] screening showed elevated total cholesterol and LDL.  Today her ASCVD score is 1.2%.    Lump  This is a new condition.  Onset: 8-12 months  Location: lateral corner of left eye  Quality: bump  Associated symptoms: no pain, no itching, no erythema, no drainage      Past Medical History:   Diagnosis Date   • Anesthesia 2019    post op n/v   • High cholesterol 2019   • Hypertension    • Pneumonia     hx        Social History     Tobacco Use   • Smoking status: Never Smoker   • Smokeless tobacco: Never Used   Substance Use Topics   • Alcohol use: No   • Drug use: No       Current Outpatient Medications Ordered in Epic   Medication Sig Dispense Refill   • triamterene/hctz (MAXZIDE-25/DYAZIDE) 37.5-25 MG Cap Take 1 Cap by mouth every morning. 90 Cap 3   • acetaminophen (TYLENOL) 500 MG Tab Take 500-1,000 mg by mouth 2 times a day as needed.     • valACYclovir (VALTREX) 500 MG Tab Take 500 mg by mouth 2 times a day as needed.  4     No current [facility]-ordered facility-administered medications on file.        Allergies:  Augmentin    Health Maintenance: Completed    ROS:  Gen: no fevers/chills  Pulm: no sob  CV: no chest pain    Objective:       Exam:  /74 (BP Location: Left arm, Patient Position: Sitting, BP Cuff Size: Large adult)   Pulse 84   Temp 37 °C (98.6 °F) (Temporal)   Resp 16   Ht 1.6 m (5' 3\")   Wt 92 kg (202 lb 12.8 oz)   " SpO2 96%   BMI 35.92 kg/m²  Body mass index is 35.92 kg/m².    Gen: Alert and oriented, No apparent distress.  Neck: Neck is supple without lymphadenopathy.  Lungs: Normal effort, CTA bilaterally, no wheezes, rhonchi, or rales  CV: Regular rate and rhythm. No murmurs, rubs, or gallops.  Ext: No clubbing, cyanosis, edema.  Skin: 5 mm pearly lump with vascularity in lateral corner of left eye.    Assessment & Plan:     39 y.o. female with the following -     1. Obesity (BMI 35.0-39.9 without comorbidity)  This is a chronic condition, improving.  She lost approximately 5 pounds since February, 2019.  She has been trying to go to the gym at least twice a week and has been trying to make healthier choices when eating.  -Continue to monitor    2. Essential hypertension  Chronic condition, well-controlled.  She is on medication and tolerating it well.  Her home blood pressure log indicates good control of her blood pressures.  -Continue triamterene-hydrochlorothiazide 37.5-25 mg daily    3. Pure hypercholesterolemia  This is a new condition.  Recent healthy tract screening labs showed elevated total cholesterol and LDL.  Her a CV risk were is 1.2%.  Therefore, no medication is necessary at this time we did discuss that continuing healthy lifestyle changes and weight loss would improve her numbers as well.    4. Lump  This is a new condition.  She reports for the last 8-12 months has had a lump in the lateral corner of her left eye.  There is no pain, no itching, no erythema, or drainage.  On exam it does not appear to be a stye.  Instead it looks like a pearly lump with some vascularity so we will send her to ophthalmology for further evaluation and treatment.      Return in about 1 year (around 1/20/2021) for Annual/wellness visit.    Please note that this dictation was created using voice recognition software. I have made every reasonable attempt to correct obvious errors, but I expect that there are errors of grammar  and possibly content that I did not discover before finalizing the note.

## 2020-03-13 ENCOUNTER — OFFICE VISIT (OUTPATIENT)
Dept: MEDICAL GROUP | Facility: PHYSICIAN GROUP | Age: 40
End: 2020-03-13
Payer: COMMERCIAL

## 2020-03-13 ENCOUNTER — HOSPITAL ENCOUNTER (OUTPATIENT)
Dept: RADIOLOGY | Facility: MEDICAL CENTER | Age: 40
End: 2020-03-13
Attending: FAMILY MEDICINE
Payer: COMMERCIAL

## 2020-03-13 VITALS
HEIGHT: 63 IN | WEIGHT: 208 LBS | DIASTOLIC BLOOD PRESSURE: 84 MMHG | TEMPERATURE: 97.9 F | RESPIRATION RATE: 16 BRPM | SYSTOLIC BLOOD PRESSURE: 114 MMHG | HEART RATE: 86 BPM | OXYGEN SATURATION: 97 % | BODY MASS INDEX: 36.86 KG/M2

## 2020-03-13 DIAGNOSIS — M79.671 RIGHT FOOT PAIN: ICD-10-CM

## 2020-03-13 PROCEDURE — 99214 OFFICE O/P EST MOD 30 MIN: CPT | Performed by: FAMILY MEDICINE

## 2020-03-13 PROCEDURE — 73630 X-RAY EXAM OF FOOT: CPT | Mod: RT

## 2020-03-13 ASSESSMENT — FIBROSIS 4 INDEX: FIB4 SCORE: 0.45

## 2020-03-13 NOTE — ASSESSMENT & PLAN NOTE
This is a new condition.  Onset: 1 week  Location: lateral right foot  Duration: constant  Quality: pressure, ache  Modifying factors: worse - taking first step; gets a little better as walking but too long and it hurts again  Precipitating trauma: no  Associated symptoms: no fevers/chills  Home treatments: tried changing shoes - no help; ibuprofen 400 mg - helps a little; heat - helps

## 2020-03-13 NOTE — PROGRESS NOTES
"Subjective:     CC: foot pain    HPI:   Halle presents today with     Right foot pain  This is a new condition.  Onset: 1 week  Location: lateral right foot  Duration: constant  Quality: pressure, ache  Modifying factors: worse - taking first step; gets a little better as walking but too long and it hurts again  Precipitating trauma: no  Associated symptoms: no fevers/chills  Home treatments: tried changing shoes - no help; ibuprofen 400 mg - helps a little; heat - helps        Past Medical History:   Diagnosis Date   • Anesthesia 08/19/2019    post op n/v   • High cholesterol 08/19/2019   • Hypertension    • Pneumonia     hx        Social History     Tobacco Use   • Smoking status: Never Smoker   • Smokeless tobacco: Never Used   Substance Use Topics   • Alcohol use: No   • Drug use: No       Current Outpatient Medications Ordered in Epic   Medication Sig Dispense Refill   • Ibuprofen (MOTRIN PO) Take  by mouth.     • triamterene/hctz (MAXZIDE-25/DYAZIDE) 37.5-25 MG Cap Take 1 Cap by mouth every morning. 90 Cap 3   • acetaminophen (TYLENOL) 500 MG Tab Take 500-1,000 mg by mouth 2 times a day as needed.     • valACYclovir (VALTREX) 500 MG Tab Take 500 mg by mouth 2 times a day as needed.  4     No current [facility]-ordered facility-administered medications on file.        Allergies:  Augmentin    Health Maintenance: Completed    ROS:  Gen: no fevers/chills  Pulm: no sob  CV: no chest pain    Objective:     Exam:  /84 (BP Location: Left arm, Patient Position: Sitting, BP Cuff Size: Adult)   Pulse 86   Temp 36.6 °C (97.9 °F) (Temporal)   Resp 16   Ht 1.6 m (5' 3\")   Wt 94.3 kg (208 lb)   SpO2 97%   BMI 36.85 kg/m²  Body mass index is 36.85 kg/m².    Constitutional: Alert, no distress, well-groomed.  Skin: Warm, dry, good turgor, no rashes in visible areas.  Eye: Equal, round and reactive, conjunctiva clear, lids normal.  ENMT: Lips without lesions, good dentition, moist mucous membranes.  Neck: Trachea " midline, no masses, no thyromegaly.  Respiratory: Unlabored respiratory effort, no cough.  MSK: Patient is able to bear weight on foot. Slightly antalgic gait.  R Ankle: No noticeable deformity, swelling or bruising. ROM intact. Tenderness to palpation on base of 5th metatarsal and inferior to lateral malleolus. No tenderness to palpation along posterior edge of lateral and medial malleoli or navicular bone. No tenderness along fibula. Squeeze test negative. External rotation test negative. Anterior drawer negative. 5/5 strength bilaterally. Sensation intact. 2+ pedal pulses.   Neuro: Grossly non-focal.   Psych: Alert and oriented x3, normal affect and mood.    Assessment & Plan:     39 y.o. female with the following -     1. Right foot pain  This is a new condition.  For the last week she has had pain along the lateral aspect of her right foot that is there constantly but will get worse when she takes her first step.  It does get better if she continues to walk but if she walks too long it starts to hurt again as a pressure and deep ache.  No precipitating trauma that she can recall.  She is tried changing shoes but there is been no difference in the pain.  She is using ibuprofen which helps a little bit and she has noticed heat helps.  She does have tenderness along the base of the fifth metatarsal and inferior to the lateral malleolus.  She also is a little bit of tenderness if you press into the fifth metatarsal from the plantar aspect.  Her story sounds a little look plantar fasciitis though the location of the pain does not make much sense.  We will get an x-ray to make sure not missing a stress fracture and we will do conservative management for now.  - DX-FOOT-COMPLETE 3+ RIGHT; Future  -Ibuprofen 600-800 mg as needed up to 4 times a day for pain  -Ice or heat as needed  -We did discuss rolling the plantar fascia on a frozen water bottle to see if that helps    Return if symptoms worsen or fail to  improve.    Please note that this dictation was created using voice recognition software. I have made every reasonable attempt to correct obvious errors, but I expect that there are errors of grammar and possibly content that I did not discover before finalizing the note.

## 2020-03-13 NOTE — PATIENT INSTRUCTIONS
Try 600-800 mg of ibuprofen (up to 4 times a day)  Try ice/heat pack, whichever feels better  Freeze a water bottle and roll your foot over the frozen bottle

## 2020-07-15 NOTE — ASSESSMENT & PLAN NOTE
This is a chronic condition.  Max weight: 215 lbs (2017)  Current weight: 202.8 lbs  Weight change: -5 lbs since 2/2019  Diet: making healthier choices  Exercise: gym at least 2x/week     Patient is a 36y old  Male who presents with a chief complaint of seizures, alcoholic cirrhosis (2020 16:59)      SUBJECTIVE / OVERNIGHT EVENTS: feels better, had bm, no cp, sob    MEDICATIONS  (STANDING):  folic acid 1 milliGRAM(s) Oral daily  lactulose Syrup 20 Gram(s) Oral every 6 hours  LORazepam     Tablet 0.5 milliGRAM(s) Oral two times a day  piperacillin/tazobactam IVPB.. 3.375 Gram(s) IV Intermittent every 8 hours  potassium phosphate / sodium phosphate Tablet (K-PHOS No. 2) 1 Tablet(s) Oral four times a day with meals  rifAXIMin 200 milliGRAM(s) Oral three times a day  thiamine 100 milliGRAM(s) Oral daily    MEDICATIONS  (PRN):        CAPILLARY BLOOD GLUCOSE        I&O's Summary    2020 07:01  -  15 Jul 2020 07:00  --------------------------------------------------------  IN: 850 mL / OUT: 0 mL / NET: 850 mL    15 Jul 2020 07:01  -  15 Jul 2020 14:05  --------------------------------------------------------  IN: 200 mL / OUT: 0 mL / NET: 200 mL        PHYSICAL EXAM:  GENERAL: NAD, well-developed  HEAD:  Atraumatic, Normocephalic  EYES: conjunctiva and sclera clear  NECK:  No JVD  CHEST/LUNG: Clear to auscultation bilaterally; No wheeze  HEART: Regular rate and rhythm;A1A1  ABDOMEN: firm, Nontender, distended; Bowel sounds present  EXTREMITIES:  2+ Peripheral Pulses  PSYCH: AAOx3  NEUROLOGY: non-focal  SKIN: No rashes or lesions    LABS:                        11.2   6.67  )-----------( 112      ( 15 Jul 2020 09:17 )             32.9     07-15    136  |  101  |  6<L>  ----------------------------<  83  3.9   |  21<L>  |  0.61    Ca    8.0<L>      15 Jul 2020 09:17  Phos  2.4     07-15  Mg     1.8     07-15    TPro  5.8<L>  /  Alb  3.0<L>  /  TBili  14.0<H>  /  DBili  x   /  AST  87<H>  /  ALT  23  /  AlkPhos  148<H>  07-15    PT/INR - ( 2020 10:13 )   PT: 20.2 sec;   INR: 1.76 ratio         PTT - ( 2020 10:13 )  PTT:32.8 sec      Urinalysis Basic - ( 2020 14:35 )    Color: Teresa / Appearance: Slightly Turbid / S.035 / pH: x  Gluc: x / Ketone: Trace  / Bili: Large / Urobili: 3 mg/dL   Blood: x / Protein: 30 mg/dL / Nitrite: Negative   Leuk Esterase: Negative / RBC: 2 /HPF / WBC 7 /HPF   Sq Epi: x / Non Sq Epi: 15 /HPF / Bacteria: Negative        RADIOLOGY & ADDITIONAL TESTS:    Imaging Personally Reviewed:    Consultant(s) Notes Reviewed:  id, hepatology    Care Discussed with Consultants/Other Providers: hepatology

## 2020-09-25 ENCOUNTER — IMMUNIZATION (OUTPATIENT)
Dept: OCCUPATIONAL MEDICINE | Facility: CLINIC | Age: 40
End: 2020-09-25

## 2020-09-25 DIAGNOSIS — Z23 NEED FOR VACCINATION: ICD-10-CM

## 2020-09-25 PROCEDURE — 90686 IIV4 VACC NO PRSV 0.5 ML IM: CPT | Performed by: NURSE PRACTITIONER

## 2020-10-26 ENCOUNTER — OFFICE VISIT (OUTPATIENT)
Dept: MEDICAL GROUP | Facility: PHYSICIAN GROUP | Age: 40
End: 2020-10-26
Payer: COMMERCIAL

## 2020-10-26 VITALS
OXYGEN SATURATION: 100 % | SYSTOLIC BLOOD PRESSURE: 132 MMHG | WEIGHT: 206.6 LBS | DIASTOLIC BLOOD PRESSURE: 88 MMHG | HEIGHT: 63 IN | TEMPERATURE: 97.7 F | RESPIRATION RATE: 16 BRPM | BODY MASS INDEX: 36.61 KG/M2 | HEART RATE: 86 BPM

## 2020-10-26 DIAGNOSIS — R10.11 RIGHT UPPER QUADRANT ABDOMINAL PAIN: ICD-10-CM

## 2020-10-26 PROBLEM — R10.9 ABDOMINAL PAIN: Status: ACTIVE | Noted: 2020-10-26

## 2020-10-26 PROCEDURE — 99214 OFFICE O/P EST MOD 30 MIN: CPT | Performed by: FAMILY MEDICINE

## 2020-10-26 RX ORDER — OMEPRAZOLE 20 MG/1
20 CAPSULE, DELAYED RELEASE ORAL DAILY
Qty: 45 CAP | Refills: 0 | Status: SHIPPED | OUTPATIENT
Start: 2020-10-26 | End: 2020-12-28

## 2020-10-26 ASSESSMENT — FIBROSIS 4 INDEX: FIB4 SCORE: 0.46

## 2020-10-26 NOTE — PROGRESS NOTES
"Subjective:     CC: abdominal pain    HPI:   Halle presents today with     Abdominal pain  This is a new condition.  Onset: 1 month  Location: RUQ  Duration: intermittent  Quality: burning pain  Modifying factors: worse/trigger - nothing she can find  Precipitating trauma: none  Associated symptoms: no nausea/vomiting, no gas, no burping, no diarrhea, no constipation, +heartburn occasionally but not with the burning pain  Home treatments: tums - didn't help      Past Medical History:   Diagnosis Date   • Anesthesia 08/19/2019    post op n/v   • High cholesterol 08/19/2019   • Hypertension    • Pneumonia     hx        Social History     Tobacco Use   • Smoking status: Never Smoker   • Smokeless tobacco: Never Used   Substance Use Topics   • Alcohol use: No   • Drug use: No       Current Outpatient Medications Ordered in Epic   Medication Sig Dispense Refill   • omeprazole (PRILOSEC) 20 MG delayed-release capsule Take 1 Cap by mouth every day. 45 Cap 0   • Ibuprofen (MOTRIN PO) Take  by mouth.     • triamterene/hctz (MAXZIDE-25/DYAZIDE) 37.5-25 MG Cap Take 1 Cap by mouth every morning. 90 Cap 3   • acetaminophen (TYLENOL) 500 MG Tab Take 500-1,000 mg by mouth 2 times a day as needed.     • valACYclovir (VALTREX) 500 MG Tab Take 500 mg by mouth 2 times a day as needed.  4     No current [facility]-ordered facility-administered medications on file.        Allergies:  Augmentin    Health Maintenance: Completed    ROS:  Gen: no fevers/chills  Pulm: no sob  CV: no chest pain    Objective:       Exam:  /88 (BP Location: Right arm, Patient Position: Sitting, BP Cuff Size: Adult)   Pulse 86   Temp 36.5 °C (97.7 °F) (Temporal)   Resp 16   Ht 1.6 m (5' 3\")   Wt 93.7 kg (206 lb 9.6 oz)   SpO2 100%   BMI 36.60 kg/m²  Body mass index is 36.6 kg/m².    Gen: Alert and oriented, No apparent distress.  Neck: Neck is supple without lymphadenopathy.  Lungs: Normal effort, CTA bilaterally, no wheezes, rhonchi, or " rales  CV: Regular rate and rhythm. No murmurs, rubs, or gallops.  GI:  +BS, ND, +TTP in RUQ and epigastrium without rebound/guarding, no masses, no hepatosplenomegaly. Quezada's sign negative.  Ext: No clubbing, cyanosis, edema.    Assessment & Plan:     40 y.o. female with the following -     1. Right upper quadrant abdominal pain  This is a new condition.  For last month she is had intermittent pain in the right upper quadrant that she describes as burning.  She cannot find any triggers or what makes it worse.  There is no associated nausea/vomiting, diarrhea, constipation, gas, burping.  She does get occasionally get heartburn but is not associated with this burning pain.  She is tried Tums without much relief.  On exam she is tender in the right upper quadrant the epigastrium without any rebound or guarding.  Quezada sign was negative so gallbladder disease seems less likely, especially since she cannot find any food triggers.  Therefore, we are going to do some acid suppressive therapy for the next 6 weeks and see if this resolves the symptoms.  -Omeprazole 20 mg daily x6 weeks      Return in about 8 weeks (around 12/21/2020) for f/u abd pain.    Please note that this dictation was created using voice recognition software. I have made every reasonable attempt to correct obvious errors, but I expect that there are errors of grammar and possibly content that I did not discover before finalizing the note.

## 2020-10-26 NOTE — ASSESSMENT & PLAN NOTE
This is a new condition.  Onset: 1 month  Location: RUQ  Duration: intermittent  Quality: burning pain  Modifying factors: worse/trigger - nothing she can find  Precipitating trauma: none  Associated symptoms: no nausea/vomiting, no gas, no burping, no diarrhea, no constipation, +heartburn occasionally but not with the burning pain  Home treatments: tums - didn't help

## 2020-11-23 ENCOUNTER — HOSPITAL ENCOUNTER (OUTPATIENT)
Dept: RADIOLOGY | Facility: MEDICAL CENTER | Age: 40
End: 2020-11-23
Attending: FAMILY MEDICINE
Payer: COMMERCIAL

## 2020-11-23 DIAGNOSIS — Z12.31 VISIT FOR SCREENING MAMMOGRAM: ICD-10-CM

## 2020-11-23 PROCEDURE — 77067 SCR MAMMO BI INCL CAD: CPT

## 2020-12-03 ENCOUNTER — EH NON-PROVIDER (OUTPATIENT)
Dept: OCCUPATIONAL MEDICINE | Facility: CLINIC | Age: 40
End: 2020-12-03

## 2020-12-03 DIAGNOSIS — Z02.89 ENCOUNTER FOR OCCUPATIONAL HEALTH EXAMINATION INVOLVING RESPIRATOR: Primary | ICD-10-CM

## 2020-12-03 PROCEDURE — 94375 RESPIRATORY FLOW VOLUME LOOP: CPT | Performed by: PREVENTIVE MEDICINE

## 2020-12-18 DIAGNOSIS — Z23 NEED FOR VACCINATION: ICD-10-CM

## 2020-12-20 ENCOUNTER — IMMUNIZATION (OUTPATIENT)
Dept: FAMILY PLANNING/WOMEN'S HEALTH CLINIC | Facility: IMMUNIZATION CENTER | Age: 40
End: 2020-12-20

## 2020-12-20 ENCOUNTER — APPOINTMENT (OUTPATIENT)
Dept: FAMILY PLANNING/WOMEN'S HEALTH CLINIC | Facility: IMMUNIZATION CENTER | Age: 40
End: 2020-12-20
Attending: FAMILY MEDICINE
Payer: COMMERCIAL

## 2020-12-20 DIAGNOSIS — Z23 ENCOUNTER FOR VACCINATION: Primary | ICD-10-CM

## 2020-12-20 DIAGNOSIS — Z23 NEED FOR VACCINATION: ICD-10-CM

## 2020-12-20 PROCEDURE — 91300 PFIZER SARS-COV-2 VACCINE: CPT

## 2020-12-20 PROCEDURE — 0001A PFIZER SARS-COV-2 VACCINE: CPT

## 2020-12-28 ENCOUNTER — TELEMEDICINE (OUTPATIENT)
Dept: MEDICAL GROUP | Facility: PHYSICIAN GROUP | Age: 40
End: 2020-12-28
Payer: COMMERCIAL

## 2020-12-28 VITALS — WEIGHT: 206 LBS | BODY MASS INDEX: 36.5 KG/M2 | HEIGHT: 63 IN

## 2020-12-28 DIAGNOSIS — R10.11 RIGHT UPPER QUADRANT ABDOMINAL PAIN: ICD-10-CM

## 2020-12-28 PROCEDURE — 99213 OFFICE O/P EST LOW 20 MIN: CPT | Mod: 95,CR | Performed by: FAMILY MEDICINE

## 2020-12-28 RX ORDER — OMEPRAZOLE 20 MG/1
20 CAPSULE, DELAYED RELEASE ORAL DAILY
Qty: 90 CAP | Refills: 1 | Status: SHIPPED | OUTPATIENT
Start: 2020-12-28 | End: 2021-09-21

## 2020-12-28 ASSESSMENT — FIBROSIS 4 INDEX: FIB4 SCORE: 0.46

## 2020-12-28 NOTE — ASSESSMENT & PLAN NOTE
This is an acute condition.  For approximately 2 months she been getting right upper quadrant abdominal pain with may be some associated heartburn.  At her last appointment I prescribed 6 weeks of omeprazole to see if that resolves the pain. She states her abdominal pain was resolved but now that she is 2 weeks out from the prilosec, the burning pain is coming back.

## 2020-12-28 NOTE — PROGRESS NOTES
Virtual Visit: Established Patient   This visit was conducted via Zoom using secure and encrypted videoconferencing technology. The patient was in a private location in the state of [place].    The patient's identity was confirmed and verbal consent was obtained for this virtual visit.    Subjective:   CC:   Chief Complaint   Patient presents with   • Abdominal Pain     Prilosec helped - finished rx 2 weeks ago now having pain again        Halle Velasquez is a 40 y.o. female presenting for evaluation and management of:    Abdominal pain  This is an acute condition.  For approximately 2 months she been getting right upper quadrant abdominal pain with may be some associated heartburn.  At her last appointment I prescribed 6 weeks of omeprazole to see if that resolves the pain. She states her abdominal pain was resolved but now that she is 2 weeks out from the prilosec, the burning pain is coming back.      ROS   Denies any recent fevers or chills. No nausea or vomiting.    Allergies   Allergen Reactions   • Augmentin      rash       Current medicines (including changes today)  Current Outpatient Medications   Medication Sig Dispense Refill   • omeprazole (PRILOSEC) 20 MG delayed-release capsule Take 1 Cap by mouth every day. 90 Cap 1   • triamterene/hctz (MAXZIDE-25/DYAZIDE) 37.5-25 MG Cap Take 1 Cap by mouth every morning. 90 Cap 3   • valACYclovir (VALTREX) 500 MG Tab Take 500 mg by mouth 2 times a day as needed.  4     No current facility-administered medications for this visit.        Patient Active Problem List    Diagnosis Date Noted   • Abdominal pain 10/26/2020   • Right foot pain 03/13/2020   • Pure hypercholesterolemia 01/20/2020   • Lump 01/20/2020   • Obesity (BMI 35.0-39.9 without comorbidity) 08/09/2017   • Hypertension 01/23/2015       Family History   Problem Relation Age of Onset   • Hypertension Mother    • Hyperlipidemia Mother    • Hypertension Father    • Hyperlipidemia Father    •  "Alcohol/Drug Father         EtOH   • Cancer Father         kidney cancer   • Cancer Sister         skin   • Stroke Paternal Uncle    • Heart Disease Paternal Uncle    • Diabetes Neg Hx        She  has a past medical history of Anesthesia (08/19/2019), High cholesterol (08/19/2019), Hypertension, and Pneumonia.  She  has a past surgical history that includes primary c section; repeat c section w tubal ligation (6/1/2016); tonsillectomy; lymph node excision; tube & ectopic preg., removal; eye surgery; vein ligation; cystoscopy (N/A, 8/22/2019); vaginal hysterectomy scope total (N/A, 8/22/2019); and salpingectomy (Bilateral, 8/22/2019).       Objective:   Ht 1.6 m (5' 3\") Comment: Patient reported  Wt 93.4 kg (206 lb) Comment: Patient reported  BMI 36.49 kg/m²  RR: 12    Physical Exam:  Constitutional: Alert, no distress, well-groomed.  Skin: No rashes in visible areas.  Eye: Round. Conjunctiva clear, lids normal. No icterus.   ENMT: Lips pink without lesions, good dentition, moist mucous membranes. Phonation normal.  Neck: No masses, no thyromegaly. Moves freely without pain.  Respiratory: Unlabored respiratory effort, no cough or audible wheeze  Psych: Alert and oriented x3, normal affect and mood.       Assessment and Plan:   The following treatment plan was discussed:     1. Right upper quadrant abdominal pain  This is an acute condition, improved.  Had her last appointment she been having 1 month of right upper quadrant burning pain with some associated heartburn but no other associated symptoms.  I did provide 6 weeks of omeprazole and she reports that it completely resolved the symptoms, however now that she is 2 weeks out from stopping the medication the burning pain is starting to come back.  Therefore, we will restart her on the omeprazole and send her to GI for further evaluation as she may benefit from an EGD.  -Restart omeprazole 20 mg daily  - REFERRAL TO GASTROENTEROLOGY      Follow-up: Return if " symptoms worsen or fail to improve.

## 2021-01-08 PROCEDURE — 0002A PFIZER SARS-COV-2 VACCINE: CPT

## 2021-01-08 PROCEDURE — 91300 PFIZER SARS-COV-2 VACCINE: CPT

## 2021-01-10 ENCOUNTER — IMMUNIZATION (OUTPATIENT)
Dept: FAMILY PLANNING/WOMEN'S HEALTH CLINIC | Facility: IMMUNIZATION CENTER | Age: 41
End: 2021-01-10
Attending: FAMILY MEDICINE
Payer: COMMERCIAL

## 2021-01-10 DIAGNOSIS — Z23 ENCOUNTER FOR VACCINATION: Primary | ICD-10-CM

## 2021-02-04 ENCOUNTER — TELEPHONE (OUTPATIENT)
Dept: MEDICAL GROUP | Facility: PHYSICIAN GROUP | Age: 41
End: 2021-02-04

## 2021-02-04 SDOH — ECONOMIC STABILITY: HOUSING INSECURITY
IN THE LAST 12 MONTHS, WAS THERE A TIME WHEN YOU DID NOT HAVE A STEADY PLACE TO SLEEP OR SLEPT IN A SHELTER (INCLUDING NOW)?: NO

## 2021-02-04 SDOH — ECONOMIC STABILITY: HOUSING INSECURITY: IN THE LAST 12 MONTHS, HOW MANY PLACES HAVE YOU LIVED?: 2

## 2021-02-04 SDOH — HEALTH STABILITY: PHYSICAL HEALTH: ON AVERAGE, HOW MANY MINUTES DO YOU ENGAGE IN EXERCISE AT THIS LEVEL?: 30 MINUTES

## 2021-02-04 SDOH — ECONOMIC STABILITY: TRANSPORTATION INSECURITY
IN THE PAST 12 MONTHS, HAS THE LACK OF TRANSPORTATION KEPT YOU FROM MEDICAL APPOINTMENTS OR FROM GETTING MEDICATIONS?: NO

## 2021-02-04 SDOH — ECONOMIC STABILITY: TRANSPORTATION INSECURITY
IN THE PAST 12 MONTHS, HAS LACK OF RELIABLE TRANSPORTATION KEPT YOU FROM MEDICAL APPOINTMENTS, MEETINGS, WORK OR FROM GETTING THINGS NEEDED FOR DAILY LIVING?: NO

## 2021-02-04 SDOH — ECONOMIC STABILITY: INCOME INSECURITY: IN THE LAST 12 MONTHS, WAS THERE A TIME WHEN YOU WERE NOT ABLE TO PAY THE MORTGAGE OR RENT ON TIME?: NO

## 2021-02-04 SDOH — HEALTH STABILITY: PHYSICAL HEALTH: ON AVERAGE, HOW MANY DAYS PER WEEK DO YOU ENGAGE IN MODERATE TO STRENUOUS EXERCISE (LIKE A BRISK WALK)?: 3 DAYS

## 2021-02-04 SDOH — HEALTH STABILITY: MENTAL HEALTH
STRESS IS WHEN SOMEONE FEELS TENSE, NERVOUS, ANXIOUS, OR CAN'T SLEEP AT NIGHT BECAUSE THEIR MIND IS TROUBLED. HOW STRESSED ARE YOU?: TO SOME EXTENT

## 2021-02-04 ASSESSMENT — SOCIAL DETERMINANTS OF HEALTH (SDOH)
HOW OFTEN DO YOU ATTEND CHURCH OR RELIGIOUS SERVICES?: 1 TO 4 TIMES PER YEAR
HOW OFTEN DO YOU GET TOGETHER WITH FRIENDS OR RELATIVES?: NEVER
HOW OFTEN DO YOU HAVE SIX OR MORE DRINKS ON ONE OCCASION: NEVER
HOW OFTEN DO YOU HAVE A DRINK CONTAINING ALCOHOL: NEVER
WITHIN THE PAST 12 MONTHS, THE FOOD YOU BOUGHT JUST DIDN'T LAST AND YOU DIDN'T HAVE MONEY TO GET MORE: NEVER TRUE
IN A TYPICAL WEEK, HOW MANY TIMES DO YOU TALK ON THE PHONE WITH FAMILY, FRIENDS, OR NEIGHBORS?: MORE THAN THREE TIMES A WEEK
DO YOU BELONG TO ANY CLUBS OR ORGANIZATIONS SUCH AS CHURCH GROUPS UNIONS, FRATERNAL OR ATHLETIC GROUPS, OR SCHOOL GROUPS?: YES
WITHIN THE PAST 12 MONTHS, YOU WORRIED THAT YOUR FOOD WOULD RUN OUT BEFORE YOU GOT THE MONEY TO BUY MORE: NEVER TRUE
HOW OFTEN DO YOU ATTENT MEETINGS OF THE CLUB OR ORGANIZATION YOU BELONG TO?: 1 TO 4 TIMES PER YEAR
HOW HARD IS IT FOR YOU TO PAY FOR THE VERY BASICS LIKE FOOD, HOUSING, MEDICAL CARE, AND HEATING?: NOT HARD AT ALL

## 2021-02-04 NOTE — TELEPHONE ENCOUNTER
ANNUAL WELLNESS VISIT PRE-VISIT PLANNING    1.  Reviewed notes from the last office visit: Yes    2.  If any orders were ordered or intended to be done prior to visit (i.e. 6 mos follow-up), do we have results/consult notes or has patient scheduled?        •  Labs - Labs were not ordered at last office visit.  Note: If patient appointment is for lab review and patient did not complete labs, check with provider if OK to reschedule patient until labs completed.       •  Imaging - Imaging was not ordered at last office visit.       •  Referrals - Referral ordered, patient was seen and consult notes were requested from [facility] . Care Teams updated  YES.    3.  Immunizations were updated in Epic using Reconcile Outside Information activity? No       •  Is patient due for Tdap? NO       •  Is patient due for Shingrix? NO    4.  Patient is due for the following Health Maintenance Topics:   There are no preventive care reminders to display for this patient.    - Patient is up-to-date on all Health Maintenance topics. No records have been requested at this time.    5.  Reviewed/Updated the following with patient:       •   Preferred Pharmacy? Yes       •   Preferred Lab? Yes       •   Preferred Communication? Yes       •   Allergies? Yes       •   Medications? NO       •   Social History? Yes       •   Family History (document living status of immediate family members and if + hx of  cancer, diabetes, hypertension, hyperlipidemia, heart attack, stroke) Yes    6.  Care Team Updated:       •   DME Company (gait device, O2, CPAP, etc.): N\A       •   Other Specialists (eye doctor, derm, GYN, cardiology, endo, etc): N\A    7.  Patient was not advised: “This is a free wellness visit. The provider will screen for medical conditions to help you stay healthy. If you have other concerns to address you may be asked to discuss these at a separate visit or there may be an additional fee.”     8.  AHA (Puls8) form printed for  Provider? N/A

## 2021-02-09 ENCOUNTER — OFFICE VISIT (OUTPATIENT)
Dept: MEDICAL GROUP | Facility: PHYSICIAN GROUP | Age: 41
End: 2021-02-09
Payer: COMMERCIAL

## 2021-02-09 VITALS
BODY MASS INDEX: 36.68 KG/M2 | HEART RATE: 94 BPM | SYSTOLIC BLOOD PRESSURE: 108 MMHG | TEMPERATURE: 98.2 F | RESPIRATION RATE: 16 BRPM | OXYGEN SATURATION: 98 % | HEIGHT: 63 IN | DIASTOLIC BLOOD PRESSURE: 74 MMHG | WEIGHT: 207 LBS

## 2021-02-09 DIAGNOSIS — E66.9 OBESITY (BMI 35.0-39.9 WITHOUT COMORBIDITY): ICD-10-CM

## 2021-02-09 DIAGNOSIS — Z11.59 NEED FOR HEPATITIS C SCREENING TEST: ICD-10-CM

## 2021-02-09 DIAGNOSIS — Z00.00 WELLNESS EXAMINATION: Primary | ICD-10-CM

## 2021-02-09 PROCEDURE — 99396 PREV VISIT EST AGE 40-64: CPT | Performed by: FAMILY MEDICINE

## 2021-02-09 RX ORDER — TRIAMTERENE AND HYDROCHLOROTHIAZIDE 37.5; 25 MG/1; MG/1
1 CAPSULE ORAL EVERY MORNING
Qty: 90 CAP | Refills: 3 | Status: SHIPPED | OUTPATIENT
Start: 2021-02-09 | End: 2022-02-23

## 2021-02-09 ASSESSMENT — PATIENT HEALTH QUESTIONNAIRE - PHQ9: CLINICAL INTERPRETATION OF PHQ2 SCORE: 0

## 2021-02-09 ASSESSMENT — FIBROSIS 4 INDEX: FIB4 SCORE: 0.46

## 2021-02-09 NOTE — PROGRESS NOTES
Subjective:     CC:   Chief Complaint   Patient presents with   • Annual Exam       HPI:   Halle Velasquez is a 40 y.o. female who presents for annual exam. She is feeling well and denies any complaints.    Ob-Gyn/ History:    Patient has GYN provider: yes - Dr. Amato  /Para:  3  Last Pap Smear:  . No history of abnormal pap smears.  Gyn Surgery:   x3, tubal ligation, hysterectomy, salpingectomy  Current Contraceptive Method:  N/a. Yes currently sexually active.  Last menstrual period:  .  No significant bloating/fluid retention, pelvic pain, or dyspareunia. No vaginal discharge  Post-menopausal bleeding: no  Urinary incontinence: mild, stress  Folate intake: n/a     Health Maintenance  Advanced directive: n/a   Osteoporosis Screen/ DEXA: n/a   PT/vit D for falls prevention: n/a   Cholesterol Screenin2019 - T chol 216, , HDl 45, ; The 10-year ASCVD risk score (Aladdinronaldo RAMEY Jr., et al., 2013) is: 1%  Diabetes Screenin2019 - fasting glucose 92   Aspirin Use: n/a    Diet: trying to do a healthy diet   Exercise: peloton 2-3x/week   Substance Abuse: No   Safe in relationship.   Seat belts, bike helmet, gun safety discussed.  Sun protection used.    Cancer screening  Colorectal Cancer Screening: n/a    Lung Cancer Screening: n/a - never smoker    Cervical Cancer Screening: due    Breast Cancer Screening: due 2021     Infectious disease screening/Immunizations  --STI Screening: declined  --Practices safe sex.  --HIV Screening: completed - negative   --Hepatitis C Screening: ordered   --Immunizations:    Influenza: completed    HPV:  n/a    Tetanus: due     Shingles: n/a    Pneumococcal : n/a     Other immunizations: n/a     She  has a past medical history of Anesthesia (2019), High cholesterol (2019), Hypertension, and Pneumonia.  She  has a past surgical history that includes primary c section; repeat c section w tubal ligation (2016);  tonsillectomy; lymph node excision; tube & ectopic preg., removal; eye surgery; vein ligation; cystoscopy (N/A, 8/22/2019); vaginal hysterectomy scope total (N/A, 8/22/2019); and salpingectomy (Bilateral, 8/22/2019).    Family History   Problem Relation Age of Onset   • Hypertension Mother    • Hyperlipidemia Mother    • Hypertension Father    • Hyperlipidemia Father    • Alcohol/Drug Father         EtOH   • Cancer Father         kidney cancer   • Cancer Sister         skin   • Stroke Paternal Uncle    • Heart Disease Paternal Uncle    • Diabetes Neg Hx        Social History     Socioeconomic History   • Marital status:      Spouse name: Not on file   • Number of children: Not on file   • Years of education: Not on file   • Highest education level: Master's degree (e.g., MA, MS, Kat, MEd, MSW, CRISTIANE)   Occupational History   • Not on file   Social Needs   • Financial resource strain: Not hard at all   • Food insecurity     Worry: Never true     Inability: Never true   • Transportation needs     Medical: No     Non-medical: No   Tobacco Use   • Smoking status: Never Smoker   • Smokeless tobacco: Never Used   Substance and Sexual Activity   • Alcohol use: No     Frequency: Never     Binge frequency: Never   • Drug use: No   • Sexual activity: Yes     Partners: Male     Comment: 3 boys   Lifestyle   • Physical activity     Days per week: 3 days     Minutes per session: 30 min   • Stress: To some extent   Relationships   • Social connections     Talks on phone: More than three times a week     Gets together: Never     Attends Muslim service: 1 to 4 times per year     Active member of club or organization: Yes     Attends meetings of clubs or organizations: 1 to 4 times per year     Relationship status:    • Intimate partner violence     Fear of current or ex partner: Not on file     Emotionally abused: Not on file     Physically abused: Not on file     Forced sexual activity: Not on file   Other Topics  "Concern   • Not on file   Social History Narrative   • Not on file       Patient Active Problem List    Diagnosis Date Noted   • Abdominal pain 10/26/2020   • Right foot pain 03/13/2020   • Pure hypercholesterolemia 01/20/2020   • Lump 01/20/2020   • Obesity (BMI 35.0-39.9 without comorbidity) 08/09/2017   • Hypertension 01/23/2015         Current Outpatient Medications   Medication Sig Dispense Refill   • triamterene/hctz (MAXZIDE-25/DYAZIDE) 37.5-25 MG Cap Take 1 Cap by mouth every morning. 90 Cap 3   • omeprazole (PRILOSEC) 20 MG delayed-release capsule Take 1 Cap by mouth every day. 90 Cap 1   • valACYclovir (VALTREX) 500 MG Tab Take 500 mg by mouth 2 times a day as needed.  4     No current facility-administered medications for this visit.      Allergies   Allergen Reactions   • Augmentin      rash       Review of Systems   Constitutional: Negative for fever, chills  HENT: Negative for congestion.    Eyes: Negative for pain.    Respiratory: Negative for cough  Cardiovascular: Negative for leg swelling.   Gastrointestinal: Negative for nausea, vomiting   Genitourinary: Negative for dysuria.   Skin: Negative for rash.   Neurological: Negative for dizziness.   Endo/Heme/Allergies: Does not bleed easily.   Psychiatric/Behavioral: Negative for depression.  The patient is not nervous/anxious.      Objective:     /74 (BP Location: Left arm, Patient Position: Sitting, BP Cuff Size: Large adult)   Pulse 94   Temp 36.8 °C (98.2 °F) (Temporal)   Resp 16   Ht 1.6 m (5' 3\")   Wt 93.9 kg (207 lb)   SpO2 98%   BMI 36.67 kg/m²   Body mass index is 36.67 kg/m².  Wt Readings from Last 4 Encounters:   02/09/21 93.9 kg (207 lb)   12/28/20 93.4 kg (206 lb)   10/26/20 93.7 kg (206 lb 9.6 oz)   03/13/20 94.3 kg (208 lb)       Physical Exam:  Constitutional: Well-developed and well-nourished. Not diaphoretic. No distress.   Skin: Skin is warm and dry. No rash noted.  Head: Atraumatic without lesions.  Eyes: Conjunctivae " and extraocular motions are normal. Pupils are equal, round, and reactive to light. No scleral icterus.   Ears:  External ears unremarkable. Tympanic membranes clear and intact.  Mouth/Throat: Dentition is good. Tongue normal. Oropharynx is clear and moist. Posterior pharynx without erythema or exudates.  Neck: Supple, trachea midline. Normal range of motion. No thyromegaly present. No lymphadenopathy--cervical or supraclavicular.  Cardiovascular: Regular rate and rhythm, S1 and S2 without murmur, rubs, or gallops.  Lungs: Normal inspiratory effort, CTA bilaterally, no wheezes/rhonchi/rales  Abdomen: Soft, non tender, and without distention. Active bowel sounds in all four quadrants. No rebound, guarding, masses or HSM.  Extremities: No cyanosis, clubbing, erythema, nor edema. Distal pulses intact and symmetric.   Musculoskeletal: All major joints AROM full in all directions without pain.  Neurological: Alert and oriented x 3. DTRs 2+/3 and symmetric. No cranial nerve deficit. 5/5 myotomes. Sensation intact.   Psychiatric:  Behavior, mood, and affect are appropriate.    Assessment and Plan:     1. Wellness examination  Comp Metabolic Panel   2. Need for hepatitis C screening test  HCV Scrn ( [id number] [employer])   3. Obesity (BMI 35.0-39.9 without comorbidity)  Patient identified as having weight management issue.  Appropriate orders and counseling given.     HCM:  Up to date   Labs per orders  Immunizations per orders  Patient counseled about skin care, diet, supplements, prenatal vitamins, safe sex and exercise.      Follow-up: Return in about 3 months (around 2021) for f/u obesity.

## 2021-04-20 DIAGNOSIS — Z00.6 RESEARCH STUDY PATIENT: ICD-10-CM

## 2021-04-21 ENCOUNTER — HOSPITAL ENCOUNTER (OUTPATIENT)
Facility: MEDICAL CENTER | Age: 41
End: 2021-04-21
Attending: PATHOLOGY
Payer: COMMERCIAL

## 2021-04-21 DIAGNOSIS — Z00.6 RESEARCH STUDY PATIENT: ICD-10-CM

## 2021-04-30 LAB
ELF SCORE: 8.4
RELATIVE RISK: NORMAL
RISK GROUP: NORMAL
RISK: 3.3 %

## 2021-06-11 ENCOUNTER — OFFICE VISIT (OUTPATIENT)
Dept: MEDICAL GROUP | Facility: PHYSICIAN GROUP | Age: 41
End: 2021-06-11
Payer: COMMERCIAL

## 2021-06-11 VITALS
BODY MASS INDEX: 36.54 KG/M2 | SYSTOLIC BLOOD PRESSURE: 112 MMHG | HEART RATE: 80 BPM | RESPIRATION RATE: 16 BRPM | OXYGEN SATURATION: 99 % | WEIGHT: 206.2 LBS | HEIGHT: 63 IN | TEMPERATURE: 98.5 F | DIASTOLIC BLOOD PRESSURE: 84 MMHG

## 2021-06-11 DIAGNOSIS — L98.9 SKIN LESION: ICD-10-CM

## 2021-06-11 PROCEDURE — 99213 OFFICE O/P EST LOW 20 MIN: CPT | Performed by: FAMILY MEDICINE

## 2021-06-11 ASSESSMENT — FIBROSIS 4 INDEX: FIB4 SCORE: 0.48

## 2021-06-11 NOTE — ASSESSMENT & PLAN NOTE
Onset: end of April, 2021  Location: right cheek  Quality: pimple, never came to a head, couldn't pop    At end of May it changed and became dark brown, lasted for a few days. It has lightened up and currently it doesn't look as bad.     There was some associated soreness/tenderness. No pruritus. It was flaky. Used her regular face wash but no specific treatments. No change in size.

## 2021-06-11 NOTE — PROGRESS NOTES
"Subjective:     CC: skin lesion    HPI:   Halle presents today with     Skin lesion  Onset: end of April, 2021  Location: right cheek  Quality: pimple, never came to a head, couldn't pop    At end of May it changed and became dark brown, lasted for a few days. It has lightened up and currently it doesn't look as bad.     There was some associated soreness/tenderness. No pruritus. It was flaky. Used her regular face wash but no specific treatments. No change in size.       Current Outpatient Medications Ordered in Epic   Medication Sig Dispense Refill   • triamterene/hctz (MAXZIDE-25/DYAZIDE) 37.5-25 MG Cap Take 1 Cap by mouth every morning. 90 Cap 3   • omeprazole (PRILOSEC) 20 MG delayed-release capsule Take 1 Cap by mouth every day. 90 Cap 1   • valACYclovir (VALTREX) 500 MG Tab Take 500 mg by mouth 2 times a day as needed.  4     No current [facility]-ordered facility-administered medications on file.       Health Maintenance: Completed    ROS:  Gen: no fevers/chills  Pulm: no sob  CV: no chest pain    Objective:     Exam:  /84 (BP Location: Left arm, Patient Position: Sitting, BP Cuff Size: Adult)   Pulse 80   Temp 36.9 °C (98.5 °F) (Temporal)   Resp 16   Ht 1.6 m (5' 3\")   Wt 93.5 kg (206 lb 3.2 oz)   SpO2 99%   BMI 36.53 kg/m²  Body mass index is 36.53 kg/m².    Gen: Alert and oriented, No apparent distress.  Neck: Neck is supple without lymphadenopathy.  Lungs: Normal effort, CTA bilaterally, no wheezes, rhonchi, or rales  CV: Regular rate and rhythm. No murmurs, rubs, or gallops.  Ext: No clubbing, cyanosis, edema.  Skin: 5 mm pinkish-brown macule on right cheek.     Assessment & Plan:     41 y.o. female with the following -     1. Skin lesion  This is an acute condition, uncertain prognosis.  Approximately 6 weeks ago she developed what looked like a pimple on her right cheek though it never came to ahead and she could never pop it.  Approximately 2 weeks ago it changed and became a dark brown color " that lasted for a few days and then it started to lighten up and currently does not look as bad as it did previously.  There was some associated soreness when it was and that pimple-like form but no associated pruritus but it was flaky.  She has not noticed any change in size and she did use a regular face wash without any specific treatments.  On exam she has a 5 mm macule that is a pinkish/reddish-brown color.  ABCDE of melanoma was not met.  Does not appear to be a squamous cell carcinoma or basal cell carcinoma.  It looks like it is a lesion trying to heal so after discussion today we decided to watch it and if it is not gone after 3-4 weeks, she will return to the office for a shave biopsy.    Return if symptoms worsen or fail to improve.    Please note that this dictation was created using voice recognition software. I have made every reasonable attempt to correct obvious errors, but I expect that there are errors of grammar and possibly content that I did not discover before finalizing the note.

## 2021-11-06 ENCOUNTER — HOSPITAL ENCOUNTER (OUTPATIENT)
Dept: LAB | Facility: MEDICAL CENTER | Age: 41
End: 2021-11-06
Attending: FAMILY MEDICINE
Payer: COMMERCIAL

## 2021-11-06 DIAGNOSIS — Z00.00 WELLNESS EXAMINATION: ICD-10-CM

## 2021-11-06 DIAGNOSIS — Z11.59 NEED FOR HEPATITIS C SCREENING TEST: ICD-10-CM

## 2021-11-06 LAB
ALBUMIN SERPL BCP-MCNC: 4.3 G/DL (ref 3.2–4.9)
ALBUMIN/GLOB SERPL: 1.2 G/DL
ALP SERPL-CCNC: 77 U/L (ref 30–99)
ALT SERPL-CCNC: 10 U/L (ref 2–50)
ANION GAP SERPL CALC-SCNC: 14 MMOL/L (ref 7–16)
AST SERPL-CCNC: 12 U/L (ref 12–45)
BILIRUB SERPL-MCNC: 0.3 MG/DL (ref 0.1–1.5)
BUN SERPL-MCNC: 10 MG/DL (ref 8–22)
CALCIUM SERPL-MCNC: 9.7 MG/DL (ref 8.5–10.5)
CHLORIDE SERPL-SCNC: 99 MMOL/L (ref 96–112)
CO2 SERPL-SCNC: 25 MMOL/L (ref 20–33)
CREAT SERPL-MCNC: 0.73 MG/DL (ref 0.5–1.4)
FASTING STATUS PATIENT QL REPORTED: NORMAL
GLOBULIN SER CALC-MCNC: 3.6 G/DL (ref 1.9–3.5)
GLUCOSE SERPL-MCNC: 91 MG/DL (ref 65–99)
HCV AB SER QL: NORMAL
POTASSIUM SERPL-SCNC: 3.2 MMOL/L (ref 3.6–5.5)
PROT SERPL-MCNC: 7.9 G/DL (ref 6–8.2)
SODIUM SERPL-SCNC: 138 MMOL/L (ref 135–145)

## 2021-11-06 PROCEDURE — 80053 COMPREHEN METABOLIC PANEL: CPT

## 2021-11-06 PROCEDURE — G0472 HEP C SCREEN HIGH RISK/OTHER: HCPCS

## 2021-11-06 PROCEDURE — 36415 COLL VENOUS BLD VENIPUNCTURE: CPT

## 2021-12-07 ENCOUNTER — HOSPITAL ENCOUNTER (OUTPATIENT)
Dept: RADIOLOGY | Facility: MEDICAL CENTER | Age: 41
End: 2021-12-07
Attending: FAMILY MEDICINE
Payer: COMMERCIAL

## 2021-12-07 DIAGNOSIS — Z12.31 VISIT FOR SCREENING MAMMOGRAM: ICD-10-CM

## 2021-12-07 PROCEDURE — 77063 BREAST TOMOSYNTHESIS BI: CPT

## 2022-01-17 ENCOUNTER — OFFICE VISIT (OUTPATIENT)
Dept: MEDICAL GROUP | Facility: PHYSICIAN GROUP | Age: 42
End: 2022-01-17
Payer: COMMERCIAL

## 2022-01-17 VITALS
HEIGHT: 63 IN | TEMPERATURE: 98.4 F | DIASTOLIC BLOOD PRESSURE: 82 MMHG | OXYGEN SATURATION: 98 % | HEART RATE: 76 BPM | RESPIRATION RATE: 16 BRPM | BODY MASS INDEX: 35.54 KG/M2 | WEIGHT: 200.6 LBS | SYSTOLIC BLOOD PRESSURE: 126 MMHG

## 2022-01-17 DIAGNOSIS — Z00.00 WELLNESS EXAMINATION: Primary | ICD-10-CM

## 2022-01-17 DIAGNOSIS — E87.6 HYPOKALEMIA: ICD-10-CM

## 2022-01-17 PROBLEM — M79.671 RIGHT FOOT PAIN: Status: RESOLVED | Noted: 2020-03-13 | Resolved: 2022-01-17

## 2022-01-17 PROBLEM — L98.9 SKIN LESION: Status: RESOLVED | Noted: 2017-08-09 | Resolved: 2022-01-17

## 2022-01-17 PROBLEM — R10.9 ABDOMINAL PAIN: Status: RESOLVED | Noted: 2020-10-26 | Resolved: 2022-01-17

## 2022-01-17 PROCEDURE — 99396 PREV VISIT EST AGE 40-64: CPT | Performed by: FAMILY MEDICINE

## 2022-01-17 RX ORDER — POTASSIUM CHLORIDE 20 MEQ/1
20 TABLET, EXTENDED RELEASE ORAL DAILY
Qty: 90 TABLET | Refills: 0 | Status: SHIPPED | OUTPATIENT
Start: 2022-01-17 | End: 2022-12-21

## 2022-01-17 ASSESSMENT — PATIENT HEALTH QUESTIONNAIRE - PHQ9: CLINICAL INTERPRETATION OF PHQ2 SCORE: 0

## 2022-01-17 NOTE — PROGRESS NOTES
Subjective:     CC:   Chief Complaint   Patient presents with   • Annual Exam       HPI:   Halle Velasquez is a 40 y.o. female who presents for annual exam. She is feeling well and denies any complaints.    Ob-Gyn/ History:    Patient has GYN provider: yes - Dr. Amato  /Para:  3  Last Pap Smear:  2018. No history of abnormal pap smears.  Gyn Surgery:   x3, tubal ligation, hysterectomy, salpingectomy  Current Contraceptive Method:  N/a. Yes currently sexually active.  Last menstrual period:  .  No significant bloating/fluid retention, pelvic pain, or dyspareunia. No vaginal discharge  Post-menopausal bleeding: no  Urinary incontinence: mild, stress  Folate intake: n/a     Health Maintenance  Advanced directive: n/a   Osteoporosis Screen/ DEXA: n/a   PT/vit D for falls prevention: n/a   Cholesterol Screenin2019 - T chol 216, , HDl 45, ; The 10-year ASCVD risk score ([place] RAMEY Jr., et al., 2013) is: 1.4%  Diabetes Screenin2021 - fasting glucose 91   Aspirin Use: n/a    Diet: trying to do a healthy diet   Exercise: walking occasionally  Substance Abuse: No   Safe in relationship.   Seat belts, bike helmet, gun safety discussed.  Sun protection used.    Cancer screening  Colorectal Cancer Screening: n/a    Lung Cancer Screening: n/a - never smoker    Cervical Cancer Screening: due    Breast Cancer Screening: due 2022    Infectious disease screening/Immunizations  --STI Screening: declined  --Practices safe sex.  --HIV Screening: completed - negative   --Hepatitis C Screening: completed - negative (2021)  --Immunizations:    Influenza: completed    HPV:  n/a    Tetanus: due     Shingles: n/a    Pneumococcal : n/a    COVID-19: completed (3/3 doses)   Other immunizations: n/a     She  has a past medical history of Anesthesia (2019), High cholesterol (2019), Hypertension, and Pneumonia.  She  has a past surgical history that includes primary c  section; repeat c section w tubal ligation (6/1/2016); tonsillectomy; lymph node excision; tube & ectopic preg., removal; eye surgery; vein ligation; cystoscopy (N/A, 8/22/2019); vaginal hysterectomy scope total (N/A, 8/22/2019); and salpingectomy (Bilateral, 8/22/2019).    Family History   Problem Relation Age of Onset   • Hypertension Mother    • Hyperlipidemia Mother    • Hypertension Father    • Hyperlipidemia Father    • Alcohol/Drug Father         EtOH   • Cancer Father         kidney cancer   • Cancer Sister         skin   • Stroke Paternal Uncle    • Heart Disease Paternal Uncle    • Diabetes Neg Hx        Social History     Socioeconomic History   • Marital status:      Spouse name: Not on file   • Number of children: Not on file   • Years of education: Not on file   • Highest education level: Master's degree (e.g., MA, MS, Kat, MEd, MSW, CRISTIANE)   Occupational History   • Not on file   Tobacco Use   • Smoking status: Never Smoker   • Smokeless tobacco: Never Used   Substance and Sexual Activity   • Alcohol use: No   • Drug use: No   • Sexual activity: Yes     Partners: Male     Comment: 3 boys   Other Topics Concern   • Not on file   Social History Narrative   • Not on file     Social Determinants of Health     Financial Resource Strain: Low Risk    • Difficulty of Paying Living Expenses: Not hard at all   Food Insecurity: No Food Insecurity   • Worried About Running Out of Food in the Last Year: Never true   • Ran Out of Food in the Last Year: Never true   Transportation Needs: No Transportation Needs   • Lack of Transportation (Medical): No   • Lack of Transportation (Non-Medical): No   Physical Activity: Insufficiently Active   • Days of Exercise per Week: 3 days   • Minutes of Exercise per Session: 30 min   Stress: Stress Concern Present   • Feeling of Stress : To some extent   Social Connections: Socially Integrated   • Frequency of Communication with Friends and Family: More than three times a  week   • Frequency of Social Gatherings with Friends and Family: Never   • Attends Zoroastrianism Services: 1 to 4 times per year   • Active Member of Clubs or Organizations: Yes   • Attends Club or Organization Meetings: 1 to 4 times per year   • Marital Status:    Intimate Partner Violence:    • Fear of Current or Ex-Partner: Not on file   • Emotionally Abused: Not on file   • Physically Abused: Not on file   • Sexually Abused: Not on file   Housing Stability: Low Risk    • Unable to Pay for Housing in the Last Year: No   • Number of Places Lived in the Last Year: 2   • Unstable Housing in the Last Year: No       Patient Active Problem List    Diagnosis Date Noted   • Hypokalemia 01/17/2022   • Pure hypercholesterolemia 01/20/2020   • Lump 01/20/2020   • Obesity (BMI 35.0-39.9 without comorbidity) 08/09/2017   • Hypertension 01/23/2015         Current Outpatient Medications   Medication Sig Dispense Refill   • potassium chloride SA (KDUR) 20 MEQ Tab CR Take 1 Tablet by mouth every day. 90 Tablet 0   • omeprazole (PRILOSEC) 20 MG delayed-release capsule TAKE 1 CAPSULE BY MOUTH EVERY DAY 90 Capsule 1   • triamterene/hctz (MAXZIDE-25/DYAZIDE) 37.5-25 MG Cap Take 1 Cap by mouth every morning. 90 Cap 3   • valACYclovir (VALTREX) 500 MG Tab Take 500 mg by mouth 2 times a day as needed.  4     No current facility-administered medications for this visit.     Allergies   Allergen Reactions   • Augmentin      rash       Review of Systems   Constitutional: Negative for fever, chills  HENT: Negative for congestion.    Eyes: Negative for pain.    Respiratory: Negative for shortness of breath  Cardiovascular: Negative for leg swelling.   Gastrointestinal: Negative for abdominal pain  Genitourinary: Negative for hematuria.   Skin: Negative for rash.   Neurological: Negative for dizziness.   Endo/Heme/Allergies: Does not bleed easily.   Psychiatric/Behavioral: Negative for depression.  The patient is not nervous/anxious.   "    Objective:     /82 (BP Location: Left arm, Patient Position: Sitting, BP Cuff Size: Adult)   Pulse 76   Temp 36.9 °C (98.4 °F) (Temporal)   Resp 16   Ht 1.6 m (5' 3\")   Wt 91 kg (200 lb 9.6 oz)   SpO2 98%   BMI 35.53 kg/m²   Body mass index is 35.53 kg/m².  Wt Readings from Last 4 Encounters:   01/17/22 91 kg (200 lb 9.6 oz)   06/11/21 93.5 kg (206 lb 3.2 oz)   02/09/21 93.9 kg (207 lb)   12/28/20 93.4 kg (206 lb)       Physical Exam:  Constitutional: Well-developed and well-nourished. Not diaphoretic. No distress.   Skin: Skin is warm and dry. No rash noted.  Head: Atraumatic without lesions.  Eyes: Conjunctivae and extraocular motions are normal. Pupils are equal, round, and reactive to light. No scleral icterus.   Ears:  External ears unremarkable. Tympanic membranes clear and intact.  Mouth/Throat: Dentition is good. Tongue normal. Oropharynx is clear and moist. Posterior pharynx without erythema or exudates.  Neck: Supple, trachea midline. Normal range of motion. No thyromegaly present. No lymphadenopathy--cervical or supraclavicular.  Cardiovascular: Regular rate and rhythm, S1 and S2 without murmur, rubs, or gallops.  Lungs: Normal inspiratory effort, CTA bilaterally, no wheezes/rhonchi/rales  Abdomen: Soft, non tender, and without distention. Active bowel sounds in all four quadrants. No rebound, guarding, masses or HSM.  Extremities: No cyanosis, clubbing, erythema, nor edema. Distal pulses intact and symmetric.   Musculoskeletal: All major joints AROM full in all directions without pain.  Neurological: Alert and oriented x 3. DTRs 2+/3 and symmetric. No cranial nerve deficit. 5/5 myotomes. Sensation intact.   Psychiatric:  Behavior, mood, and affect are appropriate.    Assessment and Plan:     1. Wellness examination     2. Hypokalemia  Start KCl 20 mEq daily.  Basic Metabolic Panel     HCM:  Up to date   Labs per orders  Immunizations per orders  Patient counseled about skin care, diet, " supplements, prenatal vitamins, safe sex and exercise.      Follow-up: Return in about 1 year (around 1/17/2023) for Annual/wellness visit.

## 2022-02-23 RX ORDER — TRIAMTERENE AND HYDROCHLOROTHIAZIDE 37.5; 25 MG/1; MG/1
CAPSULE ORAL
Qty: 90 CAPSULE | Refills: 3 | Status: SHIPPED | OUTPATIENT
Start: 2022-02-23 | End: 2023-02-17

## 2022-09-23 ENCOUNTER — PHARMACY VISIT (OUTPATIENT)
Dept: PHARMACY | Facility: MEDICAL CENTER | Age: 42
End: 2022-09-23
Payer: COMMERCIAL

## 2022-09-23 PROCEDURE — RXMED WILLOW AMBULATORY MEDICATION CHARGE: Performed by: INTERNAL MEDICINE

## 2022-09-23 RX ORDER — OMEPRAZOLE 20 MG/1
CAPSULE, DELAYED RELEASE ORAL
Qty: 90 CAPSULE | Refills: 0 | Status: SHIPPED | OUTPATIENT
Start: 2022-09-23 | End: 2022-12-21

## 2022-10-13 ENCOUNTER — PHARMACY VISIT (OUTPATIENT)
Dept: PHARMACY | Facility: MEDICAL CENTER | Age: 42
End: 2022-10-13
Payer: COMMERCIAL

## 2022-10-13 PROCEDURE — RXMED WILLOW AMBULATORY MEDICATION CHARGE: Performed by: INTERNAL MEDICINE

## 2022-10-13 RX ORDER — INFLUENZA A VIRUS A/BRISBANE/02/2018 IVR-190 (H1N1) ANTIGEN (FORMALDEHYDE INACTIVATED), INFLUENZA A VIRUS A/KANSAS/14/2017 X-327 (H3N2) ANTIGEN (FORMALDEHYDE INACTIVATED), INFLUENZA B VIRUS B/PHUKET/3073/2013 ANTIGEN (FORMALDEHYDE INACTIVATED), AND INFLUENZA B VIRUS B/MARYLAND/15/2016 BX-69A ANTIGEN (FORMALDEHYDE INACTIVATED) 15; 15; 15; 15 UG/.5ML; UG/.5ML; UG/.5ML; UG/.5ML
INJECTION, SUSPENSION INTRAMUSCULAR
Qty: 0.5 ML | Refills: 0 | Status: SHIPPED | OUTPATIENT
Start: 2022-10-13 | End: 2022-12-21

## 2022-12-08 ENCOUNTER — APPOINTMENT (OUTPATIENT)
Dept: RADIOLOGY | Facility: MEDICAL CENTER | Age: 42
End: 2022-12-08
Attending: FAMILY MEDICINE
Payer: COMMERCIAL

## 2022-12-08 DIAGNOSIS — Z12.31 VISIT FOR SCREENING MAMMOGRAM: ICD-10-CM

## 2022-12-14 ENCOUNTER — HOSPITAL ENCOUNTER (OUTPATIENT)
Dept: RADIOLOGY | Facility: MEDICAL CENTER | Age: 42
End: 2022-12-14
Attending: FAMILY MEDICINE
Payer: COMMERCIAL

## 2022-12-14 DIAGNOSIS — Z12.31 VISIT FOR SCREENING MAMMOGRAM: ICD-10-CM

## 2022-12-14 PROCEDURE — 77063 BREAST TOMOSYNTHESIS BI: CPT

## 2022-12-21 ENCOUNTER — OFFICE VISIT (OUTPATIENT)
Dept: MEDICAL GROUP | Facility: PHYSICIAN GROUP | Age: 42
End: 2022-12-21
Payer: COMMERCIAL

## 2022-12-21 VITALS
HEART RATE: 82 BPM | OXYGEN SATURATION: 99 % | WEIGHT: 193 LBS | BODY MASS INDEX: 34.2 KG/M2 | HEIGHT: 63 IN | SYSTOLIC BLOOD PRESSURE: 110 MMHG | TEMPERATURE: 98.7 F | DIASTOLIC BLOOD PRESSURE: 64 MMHG

## 2022-12-21 DIAGNOSIS — E66.9 OBESITY (BMI 30-39.9): ICD-10-CM

## 2022-12-21 DIAGNOSIS — E78.00 PURE HYPERCHOLESTEROLEMIA: ICD-10-CM

## 2022-12-21 DIAGNOSIS — I10 PRIMARY HYPERTENSION: Primary | ICD-10-CM

## 2022-12-21 PROCEDURE — 99214 OFFICE O/P EST MOD 30 MIN: CPT | Performed by: FAMILY MEDICINE

## 2022-12-21 RX ORDER — OMEPRAZOLE 20 MG/1
CAPSULE, DELAYED RELEASE ORAL
Qty: 90 CAPSULE | Refills: 0 | Status: SHIPPED | OUTPATIENT
Start: 2022-12-21 | End: 2023-03-24

## 2022-12-21 NOTE — PROGRESS NOTES
"Subjective:     CC: BP, labs    HPI:   Halle presents today with    Problem   Obesity (Bmi 30-39.9)    This is a chronic condition.  Max weight: 215 lbs (2017)  Current weight: 193 lbs  Weight change: -7 lbs since 2022  Diet: making healthier choices  Exercise: gym, but not consistently         Hypertension    This is a chronic condition.  Current Meds: triamterene-HCTZ 37.5-25 mg daily  Side effects: none  Home BP Los-130s/70s  Associated symptoms: no cp, no sob           Health Maintenance: Completed    ROS:  See HPI    Objective:     Exam:  /64 (BP Location: Right arm, Patient Position: Sitting, BP Cuff Size: Large adult)   Pulse 82   Temp 37.1 °C (98.7 °F) (Temporal)   Ht 1.6 m (5' 3\")   Wt 87.5 kg (193 lb)   SpO2 99%   BMI 34.19 kg/m²  Body mass index is 34.19 kg/m².    Physical Exam  Constitutional:       Appearance: Normal appearance.   Cardiovascular:      Rate and Rhythm: Normal rate and regular rhythm.      Heart sounds: Normal heart sounds.   Pulmonary:      Effort: Pulmonary effort is normal.      Breath sounds: Normal breath sounds.   Musculoskeletal:      Cervical back: Normal range of motion and neck supple.   Neurological:      Mental Status: She is alert.     Assessment & Plan:     42 y.o. female with the following -     1. Primary hypertension  Chronic, controlled.  Blood pressure is at goal under 140/90 in the office today.  We will continue the current regimen.  - triamterene-HCTZ 37.5-25 mg daily  - Comp Metabolic Panel; Future    2. Pure hypercholesterolemia  Chronic, status unknown.  She has not had a cholesterol panel checked since 2019.  Her mother did have a stroke a year ago so she is interested in reevaluating the cholesterol.  Labs have been ordered.  - Comp Metabolic Panel; Future  - Lipid Profile; Future    3. Obesity (BMI 30-39.9)  Chronic, improving.  She has lost 7 pounds since her last visit in .  She is try to work on healthy diet.  She is go to the " gym and that she is not as consistent about it as she was previously.  I encouraged her to continue working on healthy habits.    Referral for genetic research was offered. Patient is a participant.    Return in about 2 months (around 2/21/2023) for Annual/wellness visit.    Please note that this dictation was created using voice recognition software. I have made every reasonable attempt to correct obvious errors, but I expect that there are errors of grammar and possibly content that I did not discover before finalizing the note.

## 2023-01-21 ENCOUNTER — HOSPITAL ENCOUNTER (OUTPATIENT)
Dept: LAB | Facility: MEDICAL CENTER | Age: 43
End: 2023-01-21
Attending: FAMILY MEDICINE
Payer: COMMERCIAL

## 2023-01-21 DIAGNOSIS — E78.00 PURE HYPERCHOLESTEROLEMIA: ICD-10-CM

## 2023-01-21 DIAGNOSIS — I10 PRIMARY HYPERTENSION: ICD-10-CM

## 2023-01-21 LAB
ALBUMIN SERPL BCP-MCNC: 4 G/DL (ref 3.2–4.9)
ALBUMIN/GLOB SERPL: 1.1 G/DL
ALP SERPL-CCNC: 68 U/L (ref 30–99)
ALT SERPL-CCNC: 10 U/L (ref 2–50)
ANION GAP SERPL CALC-SCNC: 10 MMOL/L (ref 7–16)
AST SERPL-CCNC: 14 U/L (ref 12–45)
BILIRUB SERPL-MCNC: 0.4 MG/DL (ref 0.1–1.5)
BUN SERPL-MCNC: 10 MG/DL (ref 8–22)
CALCIUM ALBUM COR SERPL-MCNC: 9.6 MG/DL (ref 8.5–10.5)
CALCIUM SERPL-MCNC: 9.6 MG/DL (ref 8.5–10.5)
CHLORIDE SERPL-SCNC: 98 MMOL/L (ref 96–112)
CHOLEST SERPL-MCNC: 184 MG/DL (ref 100–199)
CO2 SERPL-SCNC: 26 MMOL/L (ref 20–33)
CREAT SERPL-MCNC: 0.79 MG/DL (ref 0.5–1.4)
FASTING STATUS PATIENT QL REPORTED: NORMAL
GFR SERPLBLD CREATININE-BSD FMLA CKD-EPI: 95 ML/MIN/1.73 M 2
GLOBULIN SER CALC-MCNC: 3.5 G/DL (ref 1.9–3.5)
GLUCOSE SERPL-MCNC: 97 MG/DL (ref 65–99)
HDLC SERPL-MCNC: 39 MG/DL
LDLC SERPL CALC-MCNC: 121 MG/DL
POTASSIUM SERPL-SCNC: 3.3 MMOL/L (ref 3.6–5.5)
PROT SERPL-MCNC: 7.5 G/DL (ref 6–8.2)
SODIUM SERPL-SCNC: 134 MMOL/L (ref 135–145)
TRIGL SERPL-MCNC: 122 MG/DL (ref 0–149)

## 2023-01-21 PROCEDURE — 80061 LIPID PANEL: CPT

## 2023-01-21 PROCEDURE — 36415 COLL VENOUS BLD VENIPUNCTURE: CPT

## 2023-01-21 PROCEDURE — 80053 COMPREHEN METABOLIC PANEL: CPT

## 2023-02-17 RX ORDER — TRIAMTERENE AND HYDROCHLOROTHIAZIDE 37.5; 25 MG/1; MG/1
CAPSULE ORAL
Qty: 90 CAPSULE | Refills: 3 | Status: SHIPPED | OUTPATIENT
Start: 2023-02-17 | End: 2023-06-03 | Stop reason: SDUPTHER

## 2023-02-27 ENCOUNTER — OFFICE VISIT (OUTPATIENT)
Dept: MEDICAL GROUP | Facility: PHYSICIAN GROUP | Age: 43
End: 2023-02-27
Payer: COMMERCIAL

## 2023-02-27 VITALS
OXYGEN SATURATION: 99 % | BODY MASS INDEX: 34.38 KG/M2 | WEIGHT: 194 LBS | HEIGHT: 63 IN | DIASTOLIC BLOOD PRESSURE: 68 MMHG | TEMPERATURE: 98.5 F | SYSTOLIC BLOOD PRESSURE: 124 MMHG | HEART RATE: 86 BPM

## 2023-02-27 DIAGNOSIS — E66.9 OBESITY (BMI 30-39.9): ICD-10-CM

## 2023-02-27 DIAGNOSIS — E87.6 HYPOKALEMIA: ICD-10-CM

## 2023-02-27 DIAGNOSIS — Z00.00 WELLNESS EXAMINATION: Primary | ICD-10-CM

## 2023-02-27 PROBLEM — E28.39 FEMALE HYPOGONADISM SYNDROME: Status: ACTIVE | Noted: 2022-07-20

## 2023-02-27 PROCEDURE — RXMED WILLOW AMBULATORY MEDICATION CHARGE: Performed by: FAMILY MEDICINE

## 2023-02-27 PROCEDURE — 99396 PREV VISIT EST AGE 40-64: CPT | Performed by: FAMILY MEDICINE

## 2023-02-27 RX ORDER — POTASSIUM CHLORIDE 20 MEQ/1
20 TABLET, EXTENDED RELEASE ORAL DAILY
Qty: 90 TABLET | Refills: 3 | Status: SHIPPED | OUTPATIENT
Start: 2023-02-27 | End: 2024-03-11 | Stop reason: SDUPTHER

## 2023-02-27 RX ORDER — NEOMYCIN SULFATE, POLYMYXIN B SULFATE, AND DEXAMETHASONE 3.5; 10000; 1 MG/G; [USP'U]/G; MG/G
OINTMENT OPHTHALMIC
COMMUNITY
Start: 2023-02-21 | End: 2023-06-20

## 2023-02-27 SDOH — HEALTH STABILITY: PHYSICAL HEALTH: ON AVERAGE, HOW MANY DAYS PER WEEK DO YOU ENGAGE IN MODERATE TO STRENUOUS EXERCISE (LIKE A BRISK WALK)?: 2 DAYS

## 2023-02-27 SDOH — ECONOMIC STABILITY: HOUSING INSECURITY: IN THE LAST 12 MONTHS, HOW MANY PLACES HAVE YOU LIVED?: 1

## 2023-02-27 SDOH — ECONOMIC STABILITY: FOOD INSECURITY: WITHIN THE PAST 12 MONTHS, THE FOOD YOU BOUGHT JUST DIDN'T LAST AND YOU DIDN'T HAVE MONEY TO GET MORE.: NEVER TRUE

## 2023-02-27 SDOH — ECONOMIC STABILITY: TRANSPORTATION INSECURITY
IN THE PAST 12 MONTHS, HAS LACK OF TRANSPORTATION KEPT YOU FROM MEETINGS, WORK, OR FROM GETTING THINGS NEEDED FOR DAILY LIVING?: NO

## 2023-02-27 SDOH — ECONOMIC STABILITY: FOOD INSECURITY: WITHIN THE PAST 12 MONTHS, YOU WORRIED THAT YOUR FOOD WOULD RUN OUT BEFORE YOU GOT MONEY TO BUY MORE.: NEVER TRUE

## 2023-02-27 SDOH — ECONOMIC STABILITY: INCOME INSECURITY: HOW HARD IS IT FOR YOU TO PAY FOR THE VERY BASICS LIKE FOOD, HOUSING, MEDICAL CARE, AND HEATING?: NOT HARD AT ALL

## 2023-02-27 SDOH — HEALTH STABILITY: PHYSICAL HEALTH: ON AVERAGE, HOW MANY MINUTES DO YOU ENGAGE IN EXERCISE AT THIS LEVEL?: 20 MIN

## 2023-02-27 SDOH — ECONOMIC STABILITY: INCOME INSECURITY: IN THE LAST 12 MONTHS, WAS THERE A TIME WHEN YOU WERE NOT ABLE TO PAY THE MORTGAGE OR RENT ON TIME?: NO

## 2023-02-27 ASSESSMENT — SOCIAL DETERMINANTS OF HEALTH (SDOH)
IN A TYPICAL WEEK, HOW MANY TIMES DO YOU TALK ON THE PHONE WITH FAMILY, FRIENDS, OR NEIGHBORS?: MORE THAN THREE TIMES A WEEK
HOW OFTEN DO YOU GET TOGETHER WITH FRIENDS OR RELATIVES?: ONCE A WEEK
HOW OFTEN DO YOU ATTENT MEETINGS OF THE CLUB OR ORGANIZATION YOU BELONG TO?: MORE THAN 4 TIMES PER YEAR
DO YOU BELONG TO ANY CLUBS OR ORGANIZATIONS SUCH AS CHURCH GROUPS UNIONS, FRATERNAL OR ATHLETIC GROUPS, OR SCHOOL GROUPS?: YES
HOW OFTEN DO YOU HAVE SIX OR MORE DRINKS ON ONE OCCASION: NEVER
HOW OFTEN DO YOU ATTENT MEETINGS OF THE CLUB OR ORGANIZATION YOU BELONG TO?: MORE THAN 4 TIMES PER YEAR
HOW OFTEN DO YOU ATTEND CHURCH OR RELIGIOUS SERVICES?: NEVER
HOW OFTEN DO YOU ATTEND CHURCH OR RELIGIOUS SERVICES?: NEVER
HOW OFTEN DO YOU GET TOGETHER WITH FRIENDS OR RELATIVES?: ONCE A WEEK
IN A TYPICAL WEEK, HOW MANY TIMES DO YOU TALK ON THE PHONE WITH FAMILY, FRIENDS, OR NEIGHBORS?: MORE THAN THREE TIMES A WEEK
HOW HARD IS IT FOR YOU TO PAY FOR THE VERY BASICS LIKE FOOD, HOUSING, MEDICAL CARE, AND HEATING?: NOT HARD AT ALL
WITHIN THE PAST 12 MONTHS, YOU WORRIED THAT YOUR FOOD WOULD RUN OUT BEFORE YOU GOT THE MONEY TO BUY MORE: NEVER TRUE
DO YOU BELONG TO ANY CLUBS OR ORGANIZATIONS SUCH AS CHURCH GROUPS UNIONS, FRATERNAL OR ATHLETIC GROUPS, OR SCHOOL GROUPS?: YES
HOW MANY DRINKS CONTAINING ALCOHOL DO YOU HAVE ON A TYPICAL DAY WHEN YOU ARE DRINKING: PATIENT DOES NOT DRINK
HOW OFTEN DO YOU HAVE A DRINK CONTAINING ALCOHOL: NEVER

## 2023-02-27 ASSESSMENT — LIFESTYLE VARIABLES
HOW MANY STANDARD DRINKS CONTAINING ALCOHOL DO YOU HAVE ON A TYPICAL DAY: PATIENT DOES NOT DRINK
SKIP TO QUESTIONS 9-10: 1
HOW OFTEN DO YOU HAVE A DRINK CONTAINING ALCOHOL: NEVER
HOW OFTEN DO YOU HAVE SIX OR MORE DRINKS ON ONE OCCASION: NEVER
AUDIT-C TOTAL SCORE: 0

## 2023-02-27 ASSESSMENT — PATIENT HEALTH QUESTIONNAIRE - PHQ9: CLINICAL INTERPRETATION OF PHQ2 SCORE: 0

## 2023-02-27 NOTE — PROGRESS NOTES
Subjective:     CC:   Chief Complaint   Patient presents with    Annual Exam       HPI:   Halle Velasquez is a 42 y.o. female who presents for annual exam. She is feeling well and denies any complaints.    Ob-Gyn/ History:    Patient has GYN provider: yes - Dr. Amato  /Para:  4/3  Last Pap Smear:  . No history of abnormal pap smears.  Gyn Surgery:   x3, tubal ligation, hysterectomy, salpingectomy  Current Contraceptive Method:  N/a. Yes currently sexually active.  Last menstrual period:  .  No significant bloating/fluid retention, pelvic pain, or dyspareunia. No vaginal discharge  Post-menopausal bleeding: no  Urinary incontinence: mild, stress  Folate intake: n/a     Health Maintenance  Advanced directive: n/a   Osteoporosis Screen/ DEXA: n/a   PT/vit D for falls prevention: n/a   Cholesterol Screenin2023 - T chol 184, , HDL 39, ; ASCVD 1.1%  Diabetes Screenin2023 - fasting glucose 97   Aspirin Use: n/a      Anticipatory Guidance  Diet: trying to do a healthy diet   Exercise: walking occasionally  Substance Abuse: No   Safe in relationship - .  Seat belts, bike helmet, gun safety discussed.  Sun protection used.    Cancer screening  Colorectal Cancer Screening: n/a    Lung Cancer Screening: n/a - never smoker    Cervical Cancer Screening: per gynecology   Breast Cancer Screening: due 2023    Infectious disease screening/Immunizations  --STI Screening: declined  --Practices safe sex.  --HIV Screening: completed - negative   --Hepatitis C Screening: completed - negative (2021)  --Immunizations:    Influenza: completed    HPV:  n/a    Tetanus: due     Shingles: n/a    Pneumococcal: n/a    Hepatitis B: reports completed   COVID-19: completed (4/4 doses)   Other immunizations: n/a     She  has a past medical history of Anesthesia (2019), High cholesterol (2019), Hypertension, and Pneumonia.  She  has a past surgical history that  includes primary c section; repeat c section w tubal ligation (06/01/2016); tonsillectomy; lymph node excision; tube & ectopic preg., removal; eye surgery; vein ligation; cystoscopy (N/A, 08/22/2019); vaginal hysterectomy scope total (N/A, 08/22/2019); salpingectomy (Bilateral, 08/22/2019); open reduction; tubal coagulation laparoscopic bilateral; and eye surgery (Left, 02/22/2023).    Family History   Problem Relation Age of Onset    Stroke Mother     Hypertension Mother     Hyperlipidemia Mother     Hypertension Father     Hyperlipidemia Father     Alcohol/Drug Father         EtOH    Cancer Father         kidney cancer    Alcohol abuse Father     Cancer Sister         skin    Stroke Paternal Uncle     Heart Disease Paternal Uncle     Heart Disease Paternal Grandfather     Diabetes Neg Hx     Breast Cancer Neg Hx     Colorectal Cancer Neg Hx     Peritoneal Cancer Neg Hx     Ovarian Cancer Neg Hx     Tubal Cancer Neg Hx        Social History     Socioeconomic History    Marital status:      Spouse name: Not on file    Number of children: Not on file    Years of education: Not on file    Highest education level: Master's degree (e.g., MA, MS, Kat, MEd, MSW, CRISTIANE)   Occupational History    Not on file   Tobacco Use    Smoking status: Never    Smokeless tobacco: Never   Vaping Use    Vaping Use: Never used   Substance and Sexual Activity    Alcohol use: No    Drug use: No    Sexual activity: Yes     Partners: Male     Birth control/protection: Female Sterilization     Comment: 3 boys   Other Topics Concern    Not on file   Social History Narrative    Not on file     Social Determinants of Health     Financial Resource Strain: Low Risk     Difficulty of Paying Living Expenses: Not hard at all   Food Insecurity: No Food Insecurity    Worried About Running Out of Food in the Last Year: Never true    Ran Out of Food in the Last Year: Never true   Transportation Needs: No Transportation Needs    Lack of  Transportation (Medical): No    Lack of Transportation (Non-Medical): No   Physical Activity: Insufficiently Active    Days of Exercise per Week: 2 days    Minutes of Exercise per Session: 20 min   Stress: Stress Concern Present    Feeling of Stress : To some extent   Social Connections: Moderately Integrated    Frequency of Communication with Friends and Family: More than three times a week    Frequency of Social Gatherings with Friends and Family: Once a week    Attends Zoroastrianism Services: Never    Active Member of Clubs or Organizations: Yes    Attends Club or Organization Meetings: More than 4 times per year    Marital Status:    Intimate Partner Violence: Not on file   Housing Stability: Low Risk     Unable to Pay for Housing in the Last Year: No    Number of Places Lived in the Last Year: 1    Unstable Housing in the Last Year: No       Patient Active Problem List    Diagnosis Date Noted    Female hypogonadism syndrome 07/20/2022    Hypokalemia 01/17/2022    Pure hypercholesterolemia 01/20/2020    Lump 01/20/2020    Obesity (BMI 30-39.9) 08/09/2017    Hypertension 01/23/2015         Current Outpatient Medications   Medication Sig Dispense Refill    neomycin-polymixin-dexamethasone (MAXITROL) 3.5-86423-3.1 Ointment ophthalmic ointment APPLY OINTMENT TO EYELIDS 3 TIMES A DAY      potassium chloride SA (KDUR) 20 MEQ Tab CR Take 1 Tablet by mouth every day. 90 Tablet 3    triamterene/hctz (MAXZIDE-25/DYAZIDE) 37.5-25 MG Cap TAKE 1 CAPSULE BY MOUTH EVERY DAY IN THE MORNING 90 Capsule 3    omeprazole (PRILOSEC) 20 MG delayed-release capsule TAKE 1 CAPSULE BY MOUTH EVERY DAY 90 Capsule 0    Chlorpheniramine Maleate (ALLERGY PO) Take  by mouth.      valACYclovir (VALTREX) 500 MG Tab Take 500 mg by mouth 2 times a day as needed.  4     No current facility-administered medications for this visit.     Allergies   Allergen Reactions    Augmentin      rash       Review of Systems   Constitutional: Negative for  "fever, chills  HENT: Negative for congestion.    Eyes: Negative for pain.    Respiratory: Negative for shortness of breath  Cardiovascular: Negative for leg swelling.   Gastrointestinal: Negative for abdominal pain  Genitourinary: Negative for hematuria.   Skin: Negative for rash.   Neurological: Negative for dizziness.   Endo/Heme/Allergies: Does not bleed easily.   Psychiatric/Behavioral: Negative for depression.  The patient is not nervous/anxious.      Objective:     /68 (BP Location: Right arm, Patient Position: Sitting, BP Cuff Size: Large adult)   Pulse 86   Temp 36.9 °C (98.5 °F) (Temporal)   Ht 1.6 m (5' 3\")   Wt 88 kg (194 lb)   SpO2 99%   BMI 34.37 kg/m²   Body mass index is 34.37 kg/m².  Wt Readings from Last 4 Encounters:   02/27/23 88 kg (194 lb)   12/21/22 87.5 kg (193 lb)   01/17/22 91 kg (200 lb 9.6 oz)   06/11/21 93.5 kg (206 lb 3.2 oz)       Physical Exam:  Constitutional: Well-developed and well-nourished. Not diaphoretic. No distress.   Skin: Skin is warm and dry. No rash noted.  Head: Atraumatic without lesions.  Eyes: Conjunctivae and extraocular motions are normal. Pupils are equal, round, and reactive to light. No scleral icterus.   Ears:  External ears unremarkable. Tympanic membranes clear and intact.  Mouth/Throat: Dentition is good. Tongue normal. Oropharynx is clear and moist. Posterior pharynx without erythema or exudates.  Neck: Supple, trachea midline. Normal range of motion. No thyromegaly present. No lymphadenopathy--cervical or supraclavicular.  Cardiovascular: Regular rate and rhythm, S1 and S2 without murmur, rubs, or gallops.  Lungs: Normal inspiratory effort, CTA bilaterally, no wheezes/rhonchi/rales  Abdomen: Soft, non tender, and without distention. Active bowel sounds in all four quadrants. No rebound, guarding, masses or HSM.  Extremities: No cyanosis, clubbing, erythema, nor edema. Distal pulses intact and symmetric.   Musculoskeletal: All major joints AROM " full in all directions without pain.  Neurological: Alert and oriented x 3. DTRs 2+/3 and symmetric. No cranial nerve deficit. 5/5 myotomes. Sensation intact.   Psychiatric:  Behavior, mood, and affect are appropriate.    Assessment and Plan:     1. Wellness examination        2. Hypokalemia        3. Obesity (BMI 30-39.9)  Patient identified as having weight management issue.  Appropriate orders and counseling given.        HCM:  Up to date   Labs per orders  Immunizations per orders  Patient counseled about skin care, diet, supplements, prenatal vitamins, safe sex and exercise.    Follow-up: Return in about 1 year (around 2/27/2024) for Annual/wellness visit.

## 2023-03-03 ENCOUNTER — PHARMACY VISIT (OUTPATIENT)
Dept: PHARMACY | Facility: MEDICAL CENTER | Age: 43
End: 2023-03-03
Payer: COMMERCIAL

## 2023-05-30 DIAGNOSIS — E87.6 HYPOKALEMIA: ICD-10-CM

## 2023-06-05 PROCEDURE — RXMED WILLOW AMBULATORY MEDICATION CHARGE: Performed by: FAMILY MEDICINE

## 2023-06-05 RX ORDER — TRIAMTERENE AND HYDROCHLOROTHIAZIDE 37.5; 25 MG/1; MG/1
1 TABLET ORAL EVERY MORNING
Qty: 90 TABLET | Refills: 3 | Status: SHIPPED | OUTPATIENT
Start: 2023-06-05

## 2023-06-05 NOTE — TELEPHONE ENCOUNTER
Received request via: Pharmacy    Was the patient seen in the last year in this department? Yes    Does the patient have an active prescription (recently filled or refills available) for medication(s) requested?  PT CHANGED PHARMACY WANTS TO PICKUP MEDICATION AT [facility] NEW RX IS NEEDED PLEASE ADVISE.    Does the patient have prison Plus and need 100 day supply (blood pressure, diabetes and cholesterol meds only)? Patient does not have SCP

## 2023-06-07 ENCOUNTER — PHARMACY VISIT (OUTPATIENT)
Dept: PHARMACY | Facility: MEDICAL CENTER | Age: 43
End: 2023-06-07
Payer: COMMERCIAL

## 2023-06-19 ENCOUNTER — HOSPITAL ENCOUNTER (OUTPATIENT)
Dept: LAB | Facility: MEDICAL CENTER | Age: 43
End: 2023-06-19
Attending: FAMILY MEDICINE
Payer: COMMERCIAL

## 2023-06-19 DIAGNOSIS — E87.6 HYPOKALEMIA: ICD-10-CM

## 2023-06-19 LAB
ANION GAP SERPL CALC-SCNC: 16 MMOL/L (ref 7–16)
BUN SERPL-MCNC: 8 MG/DL (ref 8–22)
CALCIUM SERPL-MCNC: 9.4 MG/DL (ref 8.5–10.5)
CHLORIDE SERPL-SCNC: 102 MMOL/L (ref 96–112)
CO2 SERPL-SCNC: 24 MMOL/L (ref 20–33)
CREAT SERPL-MCNC: 0.89 MG/DL (ref 0.5–1.4)
GFR SERPLBLD CREATININE-BSD FMLA CKD-EPI: 82 ML/MIN/1.73 M 2
GLUCOSE SERPL-MCNC: 88 MG/DL (ref 65–99)
POTASSIUM SERPL-SCNC: 3.4 MMOL/L (ref 3.6–5.5)
SODIUM SERPL-SCNC: 142 MMOL/L (ref 135–145)

## 2023-06-19 PROCEDURE — 36415 COLL VENOUS BLD VENIPUNCTURE: CPT

## 2023-06-19 PROCEDURE — 80048 BASIC METABOLIC PNL TOTAL CA: CPT

## 2023-06-28 PROCEDURE — RXMED WILLOW AMBULATORY MEDICATION CHARGE: Performed by: FAMILY MEDICINE

## 2023-06-29 PROCEDURE — RXMED WILLOW AMBULATORY MEDICATION CHARGE: Performed by: FAMILY MEDICINE

## 2023-06-29 RX ORDER — OMEPRAZOLE 20 MG/1
20 CAPSULE, DELAYED RELEASE ORAL
Qty: 90 CAPSULE | Refills: 3 | Status: SHIPPED | OUTPATIENT
Start: 2023-06-29

## 2023-06-30 ENCOUNTER — PHARMACY VISIT (OUTPATIENT)
Dept: PHARMACY | Facility: MEDICAL CENTER | Age: 43
End: 2023-06-30
Payer: COMMERCIAL

## 2023-07-15 ENCOUNTER — OFFICE VISIT (OUTPATIENT)
Dept: URGENT CARE | Facility: PHYSICIAN GROUP | Age: 43
End: 2023-07-15
Payer: COMMERCIAL

## 2023-07-15 VITALS
TEMPERATURE: 98.9 F | SYSTOLIC BLOOD PRESSURE: 120 MMHG | HEIGHT: 60 IN | WEIGHT: 200 LBS | OXYGEN SATURATION: 98 % | HEART RATE: 76 BPM | RESPIRATION RATE: 16 BRPM | BODY MASS INDEX: 39.27 KG/M2 | DIASTOLIC BLOOD PRESSURE: 82 MMHG

## 2023-07-15 DIAGNOSIS — R22.32 FINGER MASS, LEFT: ICD-10-CM

## 2023-07-15 PROCEDURE — 3074F SYST BP LT 130 MM HG: CPT | Performed by: FAMILY MEDICINE

## 2023-07-15 PROCEDURE — 99213 OFFICE O/P EST LOW 20 MIN: CPT | Performed by: FAMILY MEDICINE

## 2023-07-15 PROCEDURE — 3079F DIAST BP 80-89 MM HG: CPT | Performed by: FAMILY MEDICINE

## 2023-07-15 ASSESSMENT — ENCOUNTER SYMPTOMS: FEVER: 0

## 2023-07-15 NOTE — PROGRESS NOTES
Subjective:     Halle Savage is a 43 y.o. female who presents for Wound Check (L 3rd digit, growing over time, bleeds occasionally, x 3 months)    HPI  Pt presents for evaluation of an acute problem  Pt with wound on left 3rd finger the past 2.5 months  Has had slowly growing   Area does not have any pain  Still has full range of motion of the finger  Has not had any active bleeding  Area appears moist  Has been applying over-the-counter wart cream on the area  Overall worsening    Review of Systems   Constitutional:  Negative for fever.       PMH:  has a past medical history of Anesthesia (08/19/2019), High cholesterol (08/19/2019), Hypertension, and Pneumonia.  MEDS:   Current Outpatient Medications:     omeprazole (PRILOSEC) 20 MG delayed-release capsule, Take 1 Capsule by mouth every day., Disp: 90 Capsule, Rfl: 3    triamterene-hctz (MAXZIDE-25/DYAZIDE) 37.5-25 MG Tab, Take 1 Tablet by mouth every morning., Disp: 90 Tablet, Rfl: 3    potassium chloride SA (KDUR) 20 MEQ Tab CR, Take 1 Tablet by mouth every day., Disp: 90 Tablet, Rfl: 3    Chlorpheniramine Maleate (ALLERGY PO), Take  by mouth., Disp: , Rfl:     valACYclovir (VALTREX) 500 MG Tab, Take 500 mg by mouth 2 times a day as needed., Disp: , Rfl: 4  ALLERGIES:   Allergies   Allergen Reactions    Augmentin      rash     SURGHX:   Past Surgical History:   Procedure Laterality Date    EYE SURGERY Left 02/22/2023    hydrocyst removed from lower eyelid    CYSTOSCOPY N/A 08/22/2019    Procedure: CYSTOSCOPY;  Surgeon: Nehal Amato M.D.;  Location: SURGERY SAME DAY [facility];  Service: Gynecology    VAGINAL HYSTERECTOMY SCOPE TOTAL N/A 08/22/2019    adenomyosis    SALPINGECTOMY Bilateral 08/22/2019    Procedure: SALPINGECTOMY;  Surgeon: Nehal Amato M.D.;  Location: SURGERY SAME DAY [facility];  Service: Gynecology    REPEAT C SECTION W TUBAL LIGATION  06/01/2016    Procedure: REPEAT C SECTION WITH TUBAL LIGATION;  Surgeon: Jana Shepherd,  M.D.;  Location: LABOR AND DELIVERY;  Service:     EYE SURGERY      lasik    LYMPH NODE EXCISION      right side of the neck    OPEN REDUCTION      PRIMARY C SECTION      TONSILLECTOMY      TUBAL COAGULATION LAPAROSCOPIC BILATERAL      TUBE & ECTOPIC PREG., REMOVAL      VEIN LIGATION      right greater saphenous for varicose veins     SOCHX:  reports that she has never smoked. She has never used smokeless tobacco. She reports that she does not drink alcohol and does not use drugs.     Objective:   /82   Pulse 76   Temp 37.2 °C (98.9 °F)   Resp 16   Ht 1.524 m (5')   Wt 90.7 kg (200 lb)   SpO2 98%   BMI 39.06 kg/m²     Physical Exam  Constitutional:       General: She is not in acute distress.     Appearance: She is well-developed. She is not diaphoretic.   Pulmonary:      Effort: Pulmonary effort is normal.   Neurological:      Mental Status: She is alert.     Left third finger with growth on the lateral aspect of finger distal to the DIP and lateral to the fingernail.  There is granulation tissue throughout and protrudes from the base approximately 0.5 cm.  At the base, there is slight lifting from the skin.  No surrounding erythema, no active drainage or bleeding    Assessment/Plan:   Assessment    1. Finger mass, left  - Referral to Orthopedics    Patient with left finger mass.  This appears to be a granuloma.  Advised local wound care and referral to hand surgery to consider possible surgical debridement.  Advised not to put any more wart medication on the area and only keep it locally covered.  Avoid strong cleaning medications such as hydrogen peroxide or alcohol.  Follow-up with hand surgery.

## 2023-07-25 PROBLEM — L98.0 PYOGENIC GRANULOMA: Status: ACTIVE | Noted: 2023-07-25

## 2023-07-31 ENCOUNTER — APPOINTMENT (OUTPATIENT)
Dept: ADMISSIONS | Facility: MEDICAL CENTER | Age: 43
End: 2023-07-31
Attending: STUDENT IN AN ORGANIZED HEALTH CARE EDUCATION/TRAINING PROGRAM
Payer: COMMERCIAL

## 2023-08-07 ENCOUNTER — PRE-ADMISSION TESTING (OUTPATIENT)
Dept: ADMISSIONS | Facility: MEDICAL CENTER | Age: 43
End: 2023-08-07
Attending: STUDENT IN AN ORGANIZED HEALTH CARE EDUCATION/TRAINING PROGRAM
Payer: COMMERCIAL

## 2023-08-07 RX ORDER — IBUPROFEN 200 MG
400 TABLET ORAL EVERY 6 HOURS PRN
COMMUNITY
End: 2024-02-28

## 2023-08-14 ENCOUNTER — PHARMACY VISIT (OUTPATIENT)
Dept: PHARMACY | Facility: MEDICAL CENTER | Age: 43
End: 2023-08-14
Payer: COMMERCIAL

## 2023-08-14 ENCOUNTER — ANESTHESIA EVENT (OUTPATIENT)
Dept: SURGERY | Facility: MEDICAL CENTER | Age: 43
End: 2023-08-14
Payer: COMMERCIAL

## 2023-08-14 ENCOUNTER — ANESTHESIA (OUTPATIENT)
Dept: SURGERY | Facility: MEDICAL CENTER | Age: 43
End: 2023-08-14
Payer: COMMERCIAL

## 2023-08-14 ENCOUNTER — HOSPITAL ENCOUNTER (OUTPATIENT)
Facility: MEDICAL CENTER | Age: 43
End: 2023-08-14
Attending: STUDENT IN AN ORGANIZED HEALTH CARE EDUCATION/TRAINING PROGRAM | Admitting: STUDENT IN AN ORGANIZED HEALTH CARE EDUCATION/TRAINING PROGRAM
Payer: COMMERCIAL

## 2023-08-14 VITALS
WEIGHT: 199.74 LBS | BODY MASS INDEX: 35.39 KG/M2 | OXYGEN SATURATION: 95 % | HEART RATE: 100 BPM | SYSTOLIC BLOOD PRESSURE: 132 MMHG | RESPIRATION RATE: 18 BRPM | DIASTOLIC BLOOD PRESSURE: 79 MMHG | HEIGHT: 63 IN | TEMPERATURE: 98.2 F

## 2023-08-14 DIAGNOSIS — L98.0 PYOGENIC GRANULOMA: ICD-10-CM

## 2023-08-14 LAB — PATHOLOGY CONSULT NOTE: NORMAL

## 2023-08-14 PROCEDURE — RXMED WILLOW AMBULATORY MEDICATION CHARGE: Performed by: STUDENT IN AN ORGANIZED HEALTH CARE EDUCATION/TRAINING PROGRAM

## 2023-08-14 PROCEDURE — 700111 HCHG RX REV CODE 636 W/ 250 OVERRIDE (IP): Mod: JZ | Performed by: ANESTHESIOLOGY

## 2023-08-14 PROCEDURE — 160048 HCHG OR STATISTICAL LEVEL 1-5: Performed by: STUDENT IN AN ORGANIZED HEALTH CARE EDUCATION/TRAINING PROGRAM

## 2023-08-14 PROCEDURE — 160025 RECOVERY II MINUTES (STATS): Performed by: STUDENT IN AN ORGANIZED HEALTH CARE EDUCATION/TRAINING PROGRAM

## 2023-08-14 PROCEDURE — 700105 HCHG RX REV CODE 258: Mod: JZ | Performed by: STUDENT IN AN ORGANIZED HEALTH CARE EDUCATION/TRAINING PROGRAM

## 2023-08-14 PROCEDURE — C1729 CATH, DRAINAGE: HCPCS | Performed by: STUDENT IN AN ORGANIZED HEALTH CARE EDUCATION/TRAINING PROGRAM

## 2023-08-14 PROCEDURE — 700101 HCHG RX REV CODE 250: Performed by: STUDENT IN AN ORGANIZED HEALTH CARE EDUCATION/TRAINING PROGRAM

## 2023-08-14 PROCEDURE — 160009 HCHG ANES TIME/MIN: Performed by: STUDENT IN AN ORGANIZED HEALTH CARE EDUCATION/TRAINING PROGRAM

## 2023-08-14 PROCEDURE — 88305 TISSUE EXAM BY PATHOLOGIST: CPT

## 2023-08-14 PROCEDURE — 700111 HCHG RX REV CODE 636 W/ 250 OVERRIDE (IP): Performed by: STUDENT IN AN ORGANIZED HEALTH CARE EDUCATION/TRAINING PROGRAM

## 2023-08-14 PROCEDURE — 160046 HCHG PACU - 1ST 60 MINS PHASE II: Performed by: STUDENT IN AN ORGANIZED HEALTH CARE EDUCATION/TRAINING PROGRAM

## 2023-08-14 PROCEDURE — 26111 EXC HAND LES SC 1.5 CM/>: CPT | Mod: F2 | Performed by: STUDENT IN AN ORGANIZED HEALTH CARE EDUCATION/TRAINING PROGRAM

## 2023-08-14 PROCEDURE — 700101 HCHG RX REV CODE 250: Performed by: ANESTHESIOLOGY

## 2023-08-14 PROCEDURE — 160026 HCHG SURGERY MINUTES - 1ST 30 MINS LEVEL 1: Performed by: STUDENT IN AN ORGANIZED HEALTH CARE EDUCATION/TRAINING PROGRAM

## 2023-08-14 RX ORDER — LIDOCAINE HYDROCHLORIDE 10 MG/ML
INJECTION, SOLUTION INFILTRATION; PERINEURAL
Status: DISCONTINUED | OUTPATIENT
Start: 2023-08-14 | End: 2023-08-14 | Stop reason: HOSPADM

## 2023-08-14 RX ORDER — OXYCODONE HCL 5 MG/5 ML
5 SOLUTION, ORAL ORAL
Status: DISCONTINUED | OUTPATIENT
Start: 2023-08-14 | End: 2023-08-14 | Stop reason: HOSPADM

## 2023-08-14 RX ORDER — MIDAZOLAM HYDROCHLORIDE 1 MG/ML
INJECTION INTRAMUSCULAR; INTRAVENOUS PRN
Status: DISCONTINUED | OUTPATIENT
Start: 2023-08-14 | End: 2023-08-14 | Stop reason: SURG

## 2023-08-14 RX ORDER — LIDOCAINE HYDROCHLORIDE 20 MG/ML
INJECTION, SOLUTION EPIDURAL; INFILTRATION; INTRACAUDAL; PERINEURAL PRN
Status: DISCONTINUED | OUTPATIENT
Start: 2023-08-14 | End: 2023-08-14 | Stop reason: SURG

## 2023-08-14 RX ORDER — SODIUM CHLORIDE, SODIUM LACTATE, POTASSIUM CHLORIDE, CALCIUM CHLORIDE 600; 310; 30; 20 MG/100ML; MG/100ML; MG/100ML; MG/100ML
INJECTION, SOLUTION INTRAVENOUS CONTINUOUS
Status: DISCONTINUED | OUTPATIENT
Start: 2023-08-14 | End: 2023-08-14 | Stop reason: HOSPADM

## 2023-08-14 RX ORDER — SODIUM CHLORIDE, SODIUM LACTATE, POTASSIUM CHLORIDE, CALCIUM CHLORIDE 600; 310; 30; 20 MG/100ML; MG/100ML; MG/100ML; MG/100ML
INJECTION, SOLUTION INTRAVENOUS CONTINUOUS
Status: DISCONTINUED | OUTPATIENT
Start: 2023-08-14 | End: 2023-08-14

## 2023-08-14 RX ORDER — CEFAZOLIN SODIUM 1 G/3ML
INJECTION, POWDER, FOR SOLUTION INTRAMUSCULAR; INTRAVENOUS PRN
Status: DISCONTINUED | OUTPATIENT
Start: 2023-08-14 | End: 2023-08-14 | Stop reason: SURG

## 2023-08-14 RX ORDER — CEFAZOLIN SODIUM 1 G/3ML
2 INJECTION, POWDER, FOR SOLUTION INTRAMUSCULAR; INTRAVENOUS ONCE
Status: DISCONTINUED | OUTPATIENT
Start: 2023-08-14 | End: 2023-08-14 | Stop reason: HOSPADM

## 2023-08-14 RX ORDER — HYDROMORPHONE HYDROCHLORIDE 1 MG/ML
0.4 INJECTION, SOLUTION INTRAMUSCULAR; INTRAVENOUS; SUBCUTANEOUS
Status: DISCONTINUED | OUTPATIENT
Start: 2023-08-14 | End: 2023-08-14 | Stop reason: HOSPADM

## 2023-08-14 RX ORDER — BUPIVACAINE HYDROCHLORIDE 2.5 MG/ML
INJECTION, SOLUTION EPIDURAL; INFILTRATION; INTRACAUDAL
Status: DISCONTINUED | OUTPATIENT
Start: 2023-08-14 | End: 2023-08-14 | Stop reason: HOSPADM

## 2023-08-14 RX ORDER — DIPHENHYDRAMINE HYDROCHLORIDE 50 MG/ML
12.5 INJECTION INTRAMUSCULAR; INTRAVENOUS
Status: DISCONTINUED | OUTPATIENT
Start: 2023-08-14 | End: 2023-08-14 | Stop reason: HOSPADM

## 2023-08-14 RX ORDER — HYDROMORPHONE HYDROCHLORIDE 1 MG/ML
0.2 INJECTION, SOLUTION INTRAMUSCULAR; INTRAVENOUS; SUBCUTANEOUS
Status: DISCONTINUED | OUTPATIENT
Start: 2023-08-14 | End: 2023-08-14 | Stop reason: HOSPADM

## 2023-08-14 RX ORDER — HYDROMORPHONE HYDROCHLORIDE 1 MG/ML
0.1 INJECTION, SOLUTION INTRAMUSCULAR; INTRAVENOUS; SUBCUTANEOUS
Status: DISCONTINUED | OUTPATIENT
Start: 2023-08-14 | End: 2023-08-14 | Stop reason: HOSPADM

## 2023-08-14 RX ORDER — HYDROCODONE BITARTRATE AND ACETAMINOPHEN 5; 325 MG/1; MG/1
1 TABLET ORAL EVERY 4 HOURS PRN
Qty: 10 TABLET | Refills: 0 | Status: SHIPPED | OUTPATIENT
Start: 2023-08-14 | End: 2023-08-17

## 2023-08-14 RX ORDER — IPRATROPIUM BROMIDE AND ALBUTEROL SULFATE 2.5; .5 MG/3ML; MG/3ML
3 SOLUTION RESPIRATORY (INHALATION)
Status: DISCONTINUED | OUTPATIENT
Start: 2023-08-14 | End: 2023-08-14 | Stop reason: HOSPADM

## 2023-08-14 RX ORDER — OXYCODONE HCL 5 MG/5 ML
10 SOLUTION, ORAL ORAL
Status: DISCONTINUED | OUTPATIENT
Start: 2023-08-14 | End: 2023-08-14 | Stop reason: HOSPADM

## 2023-08-14 RX ORDER — HALOPERIDOL 5 MG/ML
1 INJECTION INTRAMUSCULAR
Status: DISCONTINUED | OUTPATIENT
Start: 2023-08-14 | End: 2023-08-14 | Stop reason: HOSPADM

## 2023-08-14 RX ORDER — ONDANSETRON 2 MG/ML
4 INJECTION INTRAMUSCULAR; INTRAVENOUS
Status: DISCONTINUED | OUTPATIENT
Start: 2023-08-14 | End: 2023-08-14 | Stop reason: HOSPADM

## 2023-08-14 RX ADMIN — CEFAZOLIN 2 G: 1 INJECTION, POWDER, FOR SOLUTION INTRAMUSCULAR; INTRAVENOUS at 12:42

## 2023-08-14 RX ADMIN — SODIUM CHLORIDE, POTASSIUM CHLORIDE, SODIUM LACTATE AND CALCIUM CHLORIDE: 600; 310; 30; 20 INJECTION, SOLUTION INTRAVENOUS at 11:35

## 2023-08-14 RX ADMIN — PROPOFOL 150 MCG/KG/MIN: 10 INJECTION, EMULSION INTRAVENOUS at 12:43

## 2023-08-14 RX ADMIN — LIDOCAINE HYDROCHLORIDE 100 MG: 20 INJECTION, SOLUTION EPIDURAL; INFILTRATION; INTRACAUDAL at 12:40

## 2023-08-14 RX ADMIN — MIDAZOLAM 2 MG: 1 INJECTION, SOLUTION INTRAMUSCULAR; INTRAVENOUS at 12:40

## 2023-08-14 RX ADMIN — PROPOFOL 70 MG: 10 INJECTION, EMULSION INTRAVENOUS at 12:42

## 2023-08-14 ASSESSMENT — PAIN DESCRIPTION - PAIN TYPE
TYPE: SURGICAL PAIN
TYPE: SURGICAL PAIN

## 2023-08-14 ASSESSMENT — PAIN SCALES - GENERAL: PAIN_LEVEL: 0

## 2023-08-14 NOTE — H&P
"Surgery Orthopedic History & Physical Note    Date  8/14/2023    Primary Care Physician  Marcelina Richard M.D.      Pre-Op Diagnosis Codes:     * Pyogenic granuloma [L98.0]    HPI  This is a 43 y.o. female who presented with enlarging mass of her left middle finger. Does not recall any history of injury. No history of cancer or malignancy. Mass continues to enlarge. Frequent bleeding.     Past Medical History:   Diagnosis Date    Anesthesia 08/19/2019    post op n/v    Heart burn     Hiatus hernia syndrome 2021    High cholesterol 08/19/2019    never been \"high enough\" to be medicated, currently \"watching\", 8/7/2023 pt reports labs showed cholesterol was \"better\" than it was    Hypertension     medicated; well controlled per patient    Pneumonia 12/2008    history of    PONV (postoperative nausea and vomiting) 2002       Past Surgical History:   Procedure Laterality Date    EYE SURGERY Left 02/22/2023    hydrocyst removed from lower eyelid    CYSTOSCOPY N/A 08/22/2019    Procedure: CYSTOSCOPY;  Surgeon: Nehal Amato M.D.;  Location: SURGERY SAME DAY [facility];  Service: Gynecology    VAGINAL HYSTERECTOMY SCOPE TOTAL N/A 08/22/2019    adenomyosis    SALPINGECTOMY Bilateral 08/22/2019    Procedure: SALPINGECTOMY;  Surgeon: Nehal Amato M.D.;  Location: SURGERY SAME DAY [facility];  Service: Gynecology    REPEAT C SECTION W TUBAL LIGATION  06/01/2016    Procedure: REPEAT C SECTION WITH TUBAL LIGATION;  Surgeon: Jana Shepherd M.D.;  Location: LABOR AND DELIVERY;  Service:     EYE SURGERY      lasik    LYMPH NODE EXCISION      right side of the neck    OPEN REDUCTION      PRIMARY C SECTION      TONSILLECTOMY      TUBAL COAGULATION LAPAROSCOPIC BILATERAL      TUBE & ECTOPIC PREG., REMOVAL      VEIN LIGATION      right greater saphenous for varicose veins       Current Facility-Administered Medications   Medication Dose Route Frequency Provider Last Rate Last Admin    lidocaine (Xylocaine) 1 % injection " 0.5 mL  0.5 mL Intradermal Once PRN Vikki Graham M.D.        lactated ringers infusion   Intravenous Continuous Vikki Graham M.D.        ceFAZolin (Ancef) injection 2 g  2 g Intravenous Once Vikki Graham M.D.           Social History     Socioeconomic History    Marital status:      Spouse name: Not on file    Number of children: Not on file    Years of education: Not on file    Highest education level: Master's degree (e.g., MA, MS, Kat, MEd, MSW, CRISTIANE)   Occupational History    Not on file   Tobacco Use    Smoking status: Never    Smokeless tobacco: Never   Vaping Use    Vaping Use: Never used   Substance and Sexual Activity    Alcohol use: No    Drug use: No    Sexual activity: Yes     Partners: Male     Birth control/protection: Female Sterilization     Comment: 3 boys   Other Topics Concern    Not on file   Social History Narrative    Not on file     Social Determinants of Health     Financial Resource Strain: Low Risk  (2/27/2023)    Overall Financial Resource Strain (CARDIA)     Difficulty of Paying Living Expenses: Not hard at all   Food Insecurity: No Food Insecurity (2/27/2023)    Hunger Vital Sign     Worried About Running Out of Food in the Last Year: Never true     Ran Out of Food in the Last Year: Never true   Transportation Needs: No Transportation Needs (2/27/2023)    PRAPARE - Transportation     Lack of Transportation (Medical): No     Lack of Transportation (Non-Medical): No   Physical Activity: Insufficiently Active (2/27/2023)    Exercise Vital Sign     Days of Exercise per Week: 2 days     Minutes of Exercise per Session: 20 min   Stress: Stress Concern Present (2/27/2023)    [employer] [place] [employer] - Occupational Stress Questionnaire     Feeling of Stress : To some extent   Social Connections: Moderately Integrated (2/27/2023)    Social Connection and Isolation Panel [NHANES]     Frequency of Communication with Friends and  Family: More than three times a week     Frequency of Social Gatherings with Friends and Family: Once a week     Attends Rastafari Services: Never     Active Member of Clubs or Organizations: Yes     Attends Club or Organization Meetings: More than 4 times per year     Marital Status:    Intimate Partner Violence: Not on file   Housing Stability: Low Risk  (2/27/2023)    Housing Stability Vital Sign     Unable to Pay for Housing in the Last Year: No     Number of Places Lived in the Last Year: 1     Unstable Housing in the Last Year: No       Family History   Problem Relation Age of Onset    Stroke Mother     Hypertension Mother     Hyperlipidemia Mother     Hypertension Father     Hyperlipidemia Father     Alcohol/Drug Father         EtOH    Cancer Father         kidney cancer    Alcohol abuse Father     Cancer Sister         skin    Stroke Paternal Uncle     Heart Disease Paternal Uncle     Heart Disease Paternal Grandfather     Diabetes Neg Hx     Breast Cancer Neg Hx     Colorectal Cancer Neg Hx     Peritoneal Cancer Neg Hx     Ovarian Cancer Neg Hx     Tubal Cancer Neg Hx        Allergies  Augmentin    Review of Systems  He denies any recent HA, F/C, N/V, abd pain, urinary s/s, cough/congestion, SOB, CP.       Physical Exam    Vital Signs  Blood Pressure: (!) 161/106 (rn notified)   Temperature: 36.9 °C (98.5 °F)   Pulse: 73   Respiration: 18   Pulse Oximetry: 94 %   Resp unlabored on room air  RRR  2x2cm pedunculated fragile mass left middle finger tip.       Labs:                    Radiology:  No orders to display         Assessment/Plan:  Pre-Op Diagnosis Codes:     * Pyogenic granuloma [L98.0]  Procedure(s):  LEFT MIDDLE FINGER MASS EXCISION

## 2023-08-14 NOTE — ANESTHESIA TIME REPORT
Anesthesia Start and Stop Event Times     Date Time Event    8/14/2023 1230 Ready for Procedure     1238 Anesthesia Start     1259 Anesthesia Stop        Responsible Staff  08/14/23    Name Role Begin End    Jhon Lemus M.D. Anesth 1238 1259        Overtime Reason:  no overtime (within assigned shift)    Comments:

## 2023-08-14 NOTE — OR NURSING
1258 from OR to PACU 3. Connected to monitor. Report received from anesthesia & RN. VSS on room air. Breaths calm, even, unlabored.   Surgical dressing to left middle finger clean dry and intact. Pt denies pain or nausea at this time. Tolerating po sips.     1300 Discharge instructions reviewed with patient. All questions answered, verbalizes understanding.     1310 IV and ID bands removed, assisted to change into own clothing. All personal belongings & discharge instructions with patient.    1320 Patient declined wheelchair escort. Ambulated steady to [facility] where  waiting, accompanied by RN.

## 2023-08-14 NOTE — OP REPORT
OPERATIVE NOTE  Date of procedure: 08/14/23    Pre-operative Diagnosis   1.  Left middle finger mass        Post-operative diagnosis   1.  Left middle finger mass, 2.5 x 2cm      Procedure   1.  Excision of Left middle finger mass          Surgeon   1. Vikki Matthew MD       Indication   Halle Velasquez is a 43 y.o. female who presented to my clinic with a Left middle finger mass. Possible pyogenic granuloma.    The patient is indicated for the above stated procedure.       Anesthesia   MAC/local       Implants  None      Complications   None          Informed Consent   Patient was told of the risks, benefits, alternative to the procedure.  Risks included, but not limited to, infection, wound healing issues, recurrence, injury to neurovascular and tendinous structures, incomplete resolution of their symptoms, the need for subsequent surgeries.  Advanced directive were discussed, team approach explained, they understand the role of overlapping surgeries, the use of medical photography was reviewed.  The patient consented and wished to proceed.         Procedural Pause   The patient's correct identity, side, site, procedure to be performed was verified.  Intravenous antibiotics were administered prior to skin incision.         Procedural Note   The patient was brought to the operating room where they were transferred to the operating room table and were secured with safety straps.  A well-padded, nonsterile tourniquet was applied to the upper arm.  Our anesthesia colleagues then administered MAC anesthesia.  At which point the operative extremity was prepped and draped in standard sterile fashion.      A digital block was performed with 10 cc of 0.25% Marcaine mixed one-to-one with 1% epinephrine, both plain.      The mass was large, fragile, and pedunculated. The stalk of the mass was excised in an ellipse fashion with a 15 blade.  Tenotomy dissection was performed under loupe magnification.  Skin flaps  "were then elevated full-thickness with a no touch technique utilizing skin hooks to prevent crush injuries to the skin flaps.  The mass was excised and the specimen was sent for permanent pathology.      At this point the tourniquet was deflated and hemostasis was obtained with bipolar cautery and pressure.  The wounds were all thoroughly irrigated with sterile saline.  All skin incisions were closed with 4-0 simple and nylon horizontal mattress sutures.         The final skin dressing consisted of Xeroform, 4 x 4 gauze, 2\" cling and gently wrapped Coban.  The fingertip was pink and well-perfused with 2-second capillary refill at the end of the procedure.        The patient tolerated the procedure well and was awakened from anesthesia without any known difficulties. They were transferred to the PACU in stable condition without any known complications.  All needle counts were correct.       Post-operative Plan     - The patient will remain an outpatient   - The patient will be non-weight bearing on the operative hand   - The patient was instructed to continue to move the exposed fingers and to keep the hand elevated for edema control   - The patient will be seen in 10-14 days for wound check and suture removal  -Follow-up pathology report   "

## 2023-08-14 NOTE — ANESTHESIA POSTPROCEDURE EVALUATION
Patient: Halle Vu Ron    Procedure Summary     Date: 08/14/23 Room / Location: [facility] ROOM 21 / SURGERY SAME DAY [facility]    Anesthesia Start: 1238 Anesthesia Stop: 1259    Procedure: LEFT MIDDLE FINGER MASS EXCISION (Finger) Diagnosis:       Pyogenic granuloma      (Pyogenic granuloma)    Surgeons: Vikki Graham M.D. Responsible Provider: Jhon Lemus M.D.    Anesthesia Type: MAC ASA Status: 3          Final Anesthesia Type: MAC  Last vitals  BP   Blood Pressure: 132/79    Temp   36.8 °C (98.2 °F)    Pulse   100   Resp   18    SpO2   95 %      Anesthesia Post Evaluation    Patient location during evaluation: PACU  Patient participation: complete - patient participated  Level of consciousness: awake and alert  Pain score: 0    Airway patency: patent  Anesthetic complications: no  Cardiovascular status: hemodynamically stable  Respiratory status: acceptable  Hydration status: balanced    PONV: none          There were no known notable events for this encounter.     Nurse Pain Score: 0 (NPRS)

## 2023-08-14 NOTE — DISCHARGE INSTR - OTHER INFO
DR. COLUNGA'S POST-OPERATIVE INSTRUCTIONS    You have just undergone a sugery by Dr. Colunga in the operating room.  It is our wish that your postoperative recovery be as quick and comfortable as possible.  Please carefully review the following items that are important for your recovery.    After any operation, a certain degree of pain is to be expected. Take Advil (ibuprofen) and Extra Strength Tylenol as first line medications for mild to moderate pain. Taking each one every 6 hours, and staggering them so that you are taking one medicine every 3 hours, is the most effective. Refer to dosing instructions on the bottle, but in general ibuprofen dose is 600-800mg and Tylenol dose is 500-1000mg. For most small procedures, this should be enough to keep you comfortable.  You may have been given a small prescription for stronger pain medicine which will help relieve more severe pain.  Pain medicine may make you drowsy so please keep this in mind.  Do not drive while taking pain medicine.      When you go home, please keep your operated arm elevated at all times (above the level of your heart).  If you do this, your swelling will resolve more quickly and your pain will be improve more quickly as well. You may also place an ice pack over your dressing or splint to help with swelling and pain.    The dressing that you have on should remain on, clean and dry, for 3 days after surgery. It may then be removed and then you can wash the incision gently with soap and water, pat dry, and keep the incision covered with a band-aid or similar clean dressing.    If your fingers are exposed (not covered in a dressing or splint) please continue to move them to prevent stiffness.     Follow up after surgery is typically 10-14 days, unless you were specifically instructed otherwise. If you have any questions about your appointment time  or to confirm your appointment, please call our office at [phone].     At your first follow up appointment, the sutures will be removed and you may be asked to see a hand therapist to optimize your functional result. Each of the hand therapists that you will be referred to have received special training in the care of the hand and upper extremity.    If you have questions regarding your surgery postop that you feel requires attention, please call the office at [phone] during business hours, or [phone] after hours for the answering service. If you feel that you have a surgical emergency postoperatively that requires immediate attention, please call the above numbers or go to the Emergency Department.

## 2023-08-14 NOTE — LETTER
July 28, 2023    Patient Name: Hlale Savage  Surgeon Name: Vikki Graham M.D.  Surgery Facility: [facility] ([address] [place] [address])  Surgery Date: 8/14/2023    The time of your surgery is not final and may change up to and until the day of your surgery. You will be contacted 24-48 hours prior to your surgery date with your check-in and surgery time.    If you will not be at one of the below numbers please call the surgery scheduler at [phone]  Preferred Phone: [phone]    BEFORE YOUR SURGERY   Pre Registration and/or Lab Work must be done within and no earlier than 28 days prior to your surgery date. Please call [facility] at [phone] for an appointment as soon as possible.    Instructions: Bring a list of all medications you are taking including the dosing and frequency.    DAY OF YOUR SURGERY  Nothing to eat or drink after midnight     Refrain from smoking any substance after midnight prior to surgery. Smoking may interfere with the anesthetic and frequently produces nausea during the recovery period.    Continue taking all lifesaving medications. Including the morning of your surgery with small sip of water.    Please do NOT take on the day of surgery:  Diuretics: examples- furosemide (Lasix), spironolactone, hydrochlorothiazide  ACE-inhibitors: examples- lisinopril, ramipril, enalapril  “ARBs”: examples- losartan, Olmesartan, valsartan    Please arrive at the hospital/surgery center at the check-in time provided.     An adult will need to bring you and take you home after your surgery.     AFTER YOUR SURGERY  Post op Appointment:   Date: 8/24   Time: 3PM   With: Vikki Matthew MD   Location: [address] [place] [address]    - Post Surgery - You will need someone to drive you home       TIME OFF WORK  FMLA or Disability forms can be faxed directly to: [phone] or you may drop them off at [address] [place] [address]. Our office  charges a $35.00 fee per form. Forms will be completed within 10 business days of drop off and payment received. For the status of your forms you may contact our disability office directly [phone].    MEDICATION INSTRUCTIONS **Please read section completely**    The following medications should be stopped a minimum of 10 days prior to surgery:  All over the counter, Supplements & Herbal medications    Anorectics: Phentermine (Adipex-P, Lomaira and Suprenza), Phentermine-topiramate (Qsymia), Bupropion-naltrexone (Contrave)    Opiod Partial Agonists/Opioid Antagonists: Buprenorphine (Subocone, Belbuca, Butrans, Probuphine Implant, Sublocade), Naltrexone (ReVia, Vivitrol), Naloxone    Amphetamines: Dextroamphetamine/Amphetamine (Adderall, Mydayis), Methylphenidate Hydrochloride (Concerta, Metadate, Methylin, Ritalin)    The following medications should be stopped 5 days prior to surgery:  Blood Thinners: Any Aspirin, Aspirin products, anti-inflammatories such as ibuprofen and any blood thinners such as Coumadin and Plavix. Please consult your prescribing physician if you are on life saving blood thinners, in regards to when to stop medications prior to surgery.     The following medications should be stopped a minimum of 3 days prior to surgery:  PDE-5 inhibitors: Sildenafil (Viagra), Tadalafil (Cialis), Vardenafil (Levitra), Avanafil (Stendra)    MAO Inhibitors: Rasagiline (Azilect), Selegiline (Eldepryl, Emsam, Selapar), Isocarboxazid (Marplan), Phenelzine (Nardil)         COVID and INFLUENZA NOTICE TO PATIENTS    Currently, the [facility] does not routinely test patients for COVID-19 or Influenza prior to their elective surgery.  However, if patients develop the following symptoms prior to their surgery date, they should voluntarily test for COVID-19 and Influenza and notify the surgical office of their condition and results.  The symptoms warranting testing would be any two of the  following:    Fever (Temp above 100.4 F)  Chills  Cough  Shortness of breath or difficulty breathing  Fatigue  Myalgias (muscle or body aches)  Headache  Sore Throat  Congestion or Runny Nose  Nausea or vomiting  Diarrhea  New loss of taste or smell    Having these symptoms prior to surgery can significantly increase your risk of morbidity and mortality under anesthesia, which may compromise your health and require a transfer to a hospital for a higher level of care.  Therefore, it is advisable to notify the surgical team of any illness in order to get information for the appropriate time to delay the surgery to minimize these preventable risks.    Your health and safety are our number one priority at the [facility], and we are thankful that you entrust us with your care.  Please help us ensure the best possible surgical and anesthetic outcome by sharing appropriate health information with our perioperative team and staff.  We look forward to taking great care of you!    Thank you for your time and consideration on this matter.    Dank Luis MD  Medical Director  Anesthesiologist  [facility]

## 2023-08-14 NOTE — DISCHARGE INSTRUCTIONS
If any questions arise, call your provider.  If your provider is not available, please feel free to call the Surgical Center at [phone].    MEDICATIONS: Resume taking daily medication.  Take prescribed pain medication with food.  If no medication is prescribed, you may take non-aspirin pain medication if needed.  PAIN MEDICATION CAN BE VERY CONSTIPATING.  Take a stool softener or laxative such as senokot, pericolace, or milk of magnesia if needed.    What to Expect Post Anesthesia    Rest and take it easy for the first 24 hours.  A responsible adult is recommended to remain with you during that time.  It is normal to feel sleepy.  We encourage you to not do anything that requires balance, judgment or coordination.    FOR 24 HOURS DO NOT:  Drive, operate machinery or run household appliances.  Drink beer or alcoholic beverages.  Make important decisions or sign legal documents.    To avoid nausea, slowly advance diet as tolerated, avoiding spicy or greasy foods for the first day.  Add more substantial food to your diet according to your provider's instructions.  Babies can be fed formula or breast milk as soon as they are hungry.  INCREASE FLUIDS AND FIBER TO AVOID CONSTIPATION.    MILD FLU-LIKE SYMPTOMS ARE NORMAL.  YOU MAY EXPERIENCE GENERALIZED MUSCLE ACHES, THROAT IRRITATION, HEADACHE AND/OR SOME NAUSEA.

## 2023-08-14 NOTE — ANESTHESIA PREPROCEDURE EVALUATION
" Case: [id number] Date/Time: 08/14/23 1230    Procedure: LEFT MIDDLE FINGER MASS EXCISION (Finger)    Anesthesia type: MAC    Diagnosis: Pyogenic granuloma [L98.0]    Pre-op diagnosis: Pyogenic granuloma [L98.0]    Location: [facility] ROOM 21 / SURGERY SAME DAY [facility]    Surgeons: Vikki Graham M.D.        Past Medical History:   Diagnosis Date   • Anesthesia 08/19/2019    post op n/v   • Heart burn    • Hiatus hernia syndrome 2021   • High cholesterol 08/19/2019    never been \"high enough\" to be medicated, currently \"watching\", 8/7/2023 pt reports labs showed cholesterol was \"better\" than it was   • Hypertension     medicated; well controlled per patient   • Pneumonia 12/2008    history of   • PONV (postoperative nausea and vomiting) 2002     Past Surgical History:   Procedure Laterality Date   • EYE SURGERY Left 02/22/2023    hydrocyst removed from lower eyelid   • CYSTOSCOPY N/A 08/22/2019    Procedure: CYSTOSCOPY;  Surgeon: Nehal Amato M.D.;  Location: SURGERY SAME DAY [facility] ORS;  Service: Gynecology   • VAGINAL HYSTERECTOMY SCOPE TOTAL N/A 08/22/2019    adenomyosis   • SALPINGECTOMY Bilateral 08/22/2019    Procedure: SALPINGECTOMY;  Surgeon: Nehal Amato M.D.;  Location: SURGERY SAME DAY [facility];  Service: Gynecology   • REPEAT C SECTION W TUBAL LIGATION  06/01/2016    Procedure: REPEAT C SECTION WITH TUBAL LIGATION;  Surgeon: Jana Shepherd M.D.;  Location: LABOR AND DELIVERY;  Service:    • EYE SURGERY      lasik   • LYMPH NODE EXCISION      right side of the neck   • OPEN REDUCTION     • PRIMARY C SECTION     • TONSILLECTOMY     • TUBAL COAGULATION LAPAROSCOPIC BILATERAL     • TUBE & ECTOPIC PREG., REMOVAL     • VEIN LIGATION      right greater saphenous for varicose veins     Current Outpatient Medications   Medication Instructions   • Chlorpheniramine Maleate (ALLERGY PO) 1 Tablet, Oral, DAILY   • HYDROcodone-acetaminophen (NORCO) 5-325 MG Tab per tablet 1 Tablet, Oral, EVERY 4 " HOURS PRN   • ibuprofen (MOTRIN) 400 mg, Oral, EVERY 6 HOURS PRN   • omeprazole (PRILOSEC) 20 mg, Oral, EVERY DAY   • potassium chloride SA (KDUR) 20 MEQ Tab CR 20 mEq, Oral, DAILY   • triamterene-hctz (MAXZIDE-25/DYAZIDE) 37.5-25 MG Tab 1 Tablet, Oral, EVERY MORNING   • valACYclovir (VALTREX) 500 mg, 2 TIMES DAILY PRN     Lab Results   Component Value Date/Time    SODIUM 142 06/19/2023 02:56 PM    POTASSIUM 3.4 (L) 06/19/2023 02:56 PM    CHLORIDE 102 06/19/2023 02:56 PM    CO2 24 06/19/2023 02:56 PM    GLUCOSE 88 06/19/2023 02:56 PM    BUN 8 06/19/2023 02:56 PM    CREATININE 0.89 06/19/2023 02:56 PM      Lab Results   Component Value Date/Time    WBC 16.7 (H) 08/19/2019 11:54 AM    RBC 4.49 08/19/2019 11:54 AM    HEMOGLOBIN 12.6 08/19/2019 11:54 AM    HEMATOCRIT 40.3 08/19/2019 11:54 AM    MCV 89.8 08/19/2019 11:54 AM    MCH 28.1 08/19/2019 11:54 AM    MCHC 31.3 (L) 08/19/2019 11:54 AM    MPV 12.8 08/19/2019 11:54 AM    NEUTSPOLYS 66.70 08/19/2019 11:54 AM    LYMPHOCYTES 23.70 08/19/2019 11:54 AM    MONOCYTES 7.40 08/19/2019 11:54 AM    EOSINOPHILS 1.10 08/19/2019 11:54 AM    BASOPHILS 0.70 08/19/2019 11:54 AM    HYPOCHROMIA 1+ 07/24/2014 08:02 AM      TTE 52226:  CONCLUSIONS  No prior study is available for comparison.   Normal transthoracic echocardiogram.     Relevant Problems   CARDIAC   (positive) Hypertension      Other   (positive) Obesity (BMI 30-39.9)   (positive) Pure hypercholesterolemia   (positive) Pyogenic granuloma       Physical Exam    Airway   Mallampati: II  TM distance: >3 FB  Neck ROM: full       Cardiovascular - normal exam  Rhythm: regular  Rate: normal  (-) murmur     Dental - normal exam           Pulmonary - normal exam  Breath sounds clear to auscultation     Abdominal   (+) obese     Neurological - normal exam                 Anesthesia Plan    ASA 3   ASA physical status 3 criteria: hypertension - poorly controlled    Plan - MAC               Induction: intravenous    Postoperative  Plan: Postoperative administration of opioids is intended.    Pertinent diagnostic labs and testing reviewed    Informed Consent:    Anesthetic plan and risks discussed with patient.

## 2023-08-29 ENCOUNTER — PHARMACY VISIT (OUTPATIENT)
Dept: PHARMACY | Facility: MEDICAL CENTER | Age: 43
End: 2023-08-29
Payer: COMMERCIAL

## 2023-08-29 PROCEDURE — RXMED WILLOW AMBULATORY MEDICATION CHARGE: Performed by: FAMILY MEDICINE

## 2023-09-07 PROCEDURE — RXMED WILLOW AMBULATORY MEDICATION CHARGE: Performed by: PHYSICIAN ASSISTANT

## 2023-09-08 ENCOUNTER — PHARMACY VISIT (OUTPATIENT)
Dept: PHARMACY | Facility: MEDICAL CENTER | Age: 43
End: 2023-09-08
Payer: COMMERCIAL

## 2023-09-26 PROCEDURE — RXMED WILLOW AMBULATORY MEDICATION CHARGE: Performed by: FAMILY MEDICINE

## 2023-09-29 ENCOUNTER — PHARMACY VISIT (OUTPATIENT)
Dept: PHARMACY | Facility: MEDICAL CENTER | Age: 43
End: 2023-09-29
Payer: COMMERCIAL

## 2023-10-03 ENCOUNTER — IMMUNIZATION (OUTPATIENT)
Dept: OCCUPATIONAL MEDICINE | Facility: CLINIC | Age: 43
End: 2023-10-03

## 2023-10-03 DIAGNOSIS — Z23 NEED FOR VACCINATION: Primary | ICD-10-CM

## 2023-10-03 PROCEDURE — 90686 IIV4 VACC NO PRSV 0.5 ML IM: CPT | Performed by: NURSE PRACTITIONER

## 2023-12-07 PROCEDURE — RXMED WILLOW AMBULATORY MEDICATION CHARGE: Performed by: FAMILY MEDICINE

## 2023-12-12 ENCOUNTER — PHARMACY VISIT (OUTPATIENT)
Dept: PHARMACY | Facility: MEDICAL CENTER | Age: 43
End: 2023-12-12
Payer: COMMERCIAL

## 2023-12-15 ENCOUNTER — HOSPITAL ENCOUNTER (OUTPATIENT)
Dept: RADIOLOGY | Facility: MEDICAL CENTER | Age: 43
End: 2023-12-15
Attending: FAMILY MEDICINE
Payer: COMMERCIAL

## 2023-12-15 DIAGNOSIS — Z12.31 VISIT FOR SCREENING MAMMOGRAM: ICD-10-CM

## 2023-12-15 PROCEDURE — 77063 BREAST TOMOSYNTHESIS BI: CPT

## 2024-01-09 PROCEDURE — RXMED WILLOW AMBULATORY MEDICATION CHARGE: Performed by: FAMILY MEDICINE

## 2024-01-13 ENCOUNTER — PHARMACY VISIT (OUTPATIENT)
Dept: PHARMACY | Facility: MEDICAL CENTER | Age: 44
End: 2024-01-13
Payer: COMMERCIAL

## 2024-02-27 SDOH — ECONOMIC STABILITY: FOOD INSECURITY: WITHIN THE PAST 12 MONTHS, THE FOOD YOU BOUGHT JUST DIDN'T LAST AND YOU DIDN'T HAVE MONEY TO GET MORE.: NEVER TRUE

## 2024-02-27 SDOH — HEALTH STABILITY: PHYSICAL HEALTH: ON AVERAGE, HOW MANY MINUTES DO YOU ENGAGE IN EXERCISE AT THIS LEVEL?: 30 MIN

## 2024-02-27 SDOH — HEALTH STABILITY: PHYSICAL HEALTH: ON AVERAGE, HOW MANY DAYS PER WEEK DO YOU ENGAGE IN MODERATE TO STRENUOUS EXERCISE (LIKE A BRISK WALK)?: 3 DAYS

## 2024-02-27 SDOH — ECONOMIC STABILITY: INCOME INSECURITY: HOW HARD IS IT FOR YOU TO PAY FOR THE VERY BASICS LIKE FOOD, HOUSING, MEDICAL CARE, AND HEATING?: NOT HARD AT ALL

## 2024-02-27 SDOH — ECONOMIC STABILITY: FOOD INSECURITY: WITHIN THE PAST 12 MONTHS, YOU WORRIED THAT YOUR FOOD WOULD RUN OUT BEFORE YOU GOT MONEY TO BUY MORE.: NEVER TRUE

## 2024-02-27 SDOH — ECONOMIC STABILITY: INCOME INSECURITY: IN THE LAST 12 MONTHS, WAS THERE A TIME WHEN YOU WERE NOT ABLE TO PAY THE MORTGAGE OR RENT ON TIME?: NO

## 2024-02-27 SDOH — ECONOMIC STABILITY: HOUSING INSECURITY: IN THE LAST 12 MONTHS, HOW MANY PLACES HAVE YOU LIVED?: 1

## 2024-02-27 ASSESSMENT — LIFESTYLE VARIABLES
HOW OFTEN DO YOU HAVE SIX OR MORE DRINKS ON ONE OCCASION: NEVER
HOW MANY STANDARD DRINKS CONTAINING ALCOHOL DO YOU HAVE ON A TYPICAL DAY: PATIENT DOES NOT DRINK
HOW OFTEN DO YOU HAVE A DRINK CONTAINING ALCOHOL: NEVER
SKIP TO QUESTIONS 9-10: 1
AUDIT-C TOTAL SCORE: 0

## 2024-02-27 ASSESSMENT — SOCIAL DETERMINANTS OF HEALTH (SDOH)
HOW HARD IS IT FOR YOU TO PAY FOR THE VERY BASICS LIKE FOOD, HOUSING, MEDICAL CARE, AND HEATING?: NOT HARD AT ALL
DO YOU BELONG TO ANY CLUBS OR ORGANIZATIONS SUCH AS CHURCH GROUPS UNIONS, FRATERNAL OR ATHLETIC GROUPS, OR SCHOOL GROUPS?: YES
HOW OFTEN DO YOU HAVE A DRINK CONTAINING ALCOHOL: NEVER
HOW OFTEN DO YOU ATTEND CHURCH OR RELIGIOUS SERVICES?: 1 TO 4 TIMES PER YEAR
IN A TYPICAL WEEK, HOW MANY TIMES DO YOU TALK ON THE PHONE WITH FAMILY, FRIENDS, OR NEIGHBORS?: MORE THAN THREE TIMES A WEEK
WITHIN THE PAST 12 MONTHS, YOU WORRIED THAT YOUR FOOD WOULD RUN OUT BEFORE YOU GOT THE MONEY TO BUY MORE: NEVER TRUE
HOW OFTEN DO YOU ATTENT MEETINGS OF THE CLUB OR ORGANIZATION YOU BELONG TO?: MORE THAN 4 TIMES PER YEAR
HOW OFTEN DO YOU ATTEND CHURCH OR RELIGIOUS SERVICES?: 1 TO 4 TIMES PER YEAR
HOW MANY DRINKS CONTAINING ALCOHOL DO YOU HAVE ON A TYPICAL DAY WHEN YOU ARE DRINKING: PATIENT DOES NOT DRINK
IN A TYPICAL WEEK, HOW MANY TIMES DO YOU TALK ON THE PHONE WITH FAMILY, FRIENDS, OR NEIGHBORS?: MORE THAN THREE TIMES A WEEK
HOW OFTEN DO YOU GET TOGETHER WITH FRIENDS OR RELATIVES?: MORE THAN THREE TIMES A WEEK
HOW OFTEN DO YOU HAVE SIX OR MORE DRINKS ON ONE OCCASION: NEVER
HOW OFTEN DO YOU GET TOGETHER WITH FRIENDS OR RELATIVES?: MORE THAN THREE TIMES A WEEK
HOW OFTEN DO YOU ATTENT MEETINGS OF THE CLUB OR ORGANIZATION YOU BELONG TO?: MORE THAN 4 TIMES PER YEAR
DO YOU BELONG TO ANY CLUBS OR ORGANIZATIONS SUCH AS CHURCH GROUPS UNIONS, FRATERNAL OR ATHLETIC GROUPS, OR SCHOOL GROUPS?: YES

## 2024-02-28 ENCOUNTER — OFFICE VISIT (OUTPATIENT)
Dept: MEDICAL GROUP | Facility: PHYSICIAN GROUP | Age: 44
End: 2024-02-28
Payer: COMMERCIAL

## 2024-02-28 VITALS
WEIGHT: 175.6 LBS | TEMPERATURE: 98.4 F | SYSTOLIC BLOOD PRESSURE: 110 MMHG | OXYGEN SATURATION: 98 % | BODY MASS INDEX: 31.11 KG/M2 | DIASTOLIC BLOOD PRESSURE: 80 MMHG | HEART RATE: 77 BPM | HEIGHT: 63 IN

## 2024-02-28 DIAGNOSIS — E55.9 VITAMIN D DEFICIENCY: ICD-10-CM

## 2024-02-28 DIAGNOSIS — E53.8 VITAMIN B12 DEFICIENCY (NON ANEMIC): ICD-10-CM

## 2024-02-28 DIAGNOSIS — E66.9 OBESITY (BMI 30-39.9): ICD-10-CM

## 2024-02-28 DIAGNOSIS — Z00.00 WELLNESS EXAMINATION: Primary | ICD-10-CM

## 2024-02-28 PROBLEM — L98.0 PYOGENIC GRANULOMA: Status: RESOLVED | Noted: 2023-07-25 | Resolved: 2024-02-28

## 2024-02-28 PROCEDURE — 99396 PREV VISIT EST AGE 40-64: CPT | Performed by: FAMILY MEDICINE

## 2024-02-28 PROCEDURE — 3074F SYST BP LT 130 MM HG: CPT | Performed by: FAMILY MEDICINE

## 2024-02-28 PROCEDURE — 3079F DIAST BP 80-89 MM HG: CPT | Performed by: FAMILY MEDICINE

## 2024-02-28 RX ORDER — NEOMYCIN SULFATE, POLYMYXIN B SULFATE, AND DEXAMETHASONE 3.5; 10000; 1 MG/G; [USP'U]/G; MG/G
OINTMENT OPHTHALMIC
COMMUNITY
End: 2024-02-28

## 2024-02-28 RX ORDER — TRIAMTERENE AND HYDROCHLOROTHIAZIDE 37.5; 25 MG/1; MG/1
1 CAPSULE ORAL EVERY MORNING
COMMUNITY
End: 2024-02-28

## 2024-02-28 RX ORDER — OMEPRAZOLE 20 MG/1
1 CAPSULE, DELAYED RELEASE ORAL
COMMUNITY
End: 2024-02-28

## 2024-02-28 RX ORDER — PEN NEEDLE, DIABETIC 29 G X1/2"
NEEDLE, DISPOSABLE MISCELLANEOUS
COMMUNITY
Start: 2023-12-02 | End: 2024-02-28

## 2024-02-28 RX ORDER — HYDROCODONE BITARTRATE AND ACETAMINOPHEN 5; 325 MG/1; MG/1
TABLET ORAL
COMMUNITY
End: 2024-02-28

## 2024-02-28 ASSESSMENT — PATIENT HEALTH QUESTIONNAIRE - PHQ9: CLINICAL INTERPRETATION OF PHQ2 SCORE: 0

## 2024-02-28 NOTE — PROGRESS NOTES
Subjective:     CC:   Chief Complaint   Patient presents with    Annual Exam       HPI:   Halle Velasquez is a 43 y.o. female who presents for annual exam. She is feeling well and denies any complaints.    Ob-Gyn/ History:    Patient has GYN provider: yes - Dr. Amato  /Para:  3  Last Pap Smear:  . No history of abnormal pap smears.  Gyn Surgery:   x3, tubal ligation, hysterectomy, salpingectomy  Current Contraceptive Method:  N/a. Yes currently sexually active.  Last menstrual period:  .  No significant bloating/fluid retention, pelvic pain, or dyspareunia. No vaginal discharge  Post-menopausal bleeding: no  Urinary incontinence: mild, stress  Folate intake: n/a     Health Maintenance  Advanced directive: n/a   Osteoporosis Screen/ DEXA: n/a   PT/vit D for falls prevention: n/a   Cholesterol Screenin2023 - T chol 184, , HDL 39, ; ASCVD 1.6%  Diabetes Screenin2023 - fasting glucose 97   Aspirin Use: n/a      Anticipatory Guidance  Diet: trying to do a healthy diet   Exercise: regularly  Substance Abuse: No   Safe in relationship - .  Seat belts, bike helmet, gun safety discussed.  Sun protection used.  Dental home.     Cancer screening  Colorectal Cancer Screening: n/a    Lung Cancer Screening: n/a - never smoker    Cervical Cancer Screening: n/a - s/p hysterectomy  Breast Cancer Screening: due 2024    Infectious disease screening/Immunizations  --STI Screening: declined  --Practices safe sex.  --HIV Screening: completed - negative   --Hepatitis C Screening: completed - negative (2021)  --Immunizations:    Influenza: completed    HPV:  n/a    Tetanus: due     Shingles: n/a    Pneumococcal: n/a    Hepatitis B: reports completed   COVID-19: - booster recommended   Other immunizations: n/a     She  has a past medical history of Anesthesia (2019), Heart burn, Hiatus hernia syndrome (), High cholesterol (2019), Hypertension,  Pneumonia (12/2008), and PONV (postoperative nausea and vomiting) (2002).  She  has a past surgical history that includes primary c section; repeat c section w tubal ligation (06/01/2016); tonsillectomy; lymph node excision; tube & ectopic preg., removal; eye surgery; vein ligation; cystoscopy (N/A, 08/22/2019); vaginal hysterectomy scope total (N/A, 08/22/2019); salpingectomy (Bilateral, 08/22/2019); open reduction; tubal coagulation laparoscopic bilateral; eye surgery (Left, 02/22/2023); and excision, mass, finger (8/14/2023).    Family History   Problem Relation Age of Onset    Stroke Mother     Hypertension Mother     Hyperlipidemia Mother     Dementia Mother     Hypertension Father     Hyperlipidemia Father     Alcohol/Drug Father         EtOH    Cancer Father         kidney cancer    Alcohol abuse Father     Cancer Sister         skin    Stroke Paternal Uncle     Heart Disease Paternal Uncle     Heart Disease Paternal Grandfather     Diabetes Paternal Grandfather     Breast Cancer Neg Hx     Colorectal Cancer Neg Hx     Peritoneal Cancer Neg Hx     Ovarian Cancer Neg Hx     Tubal Cancer Neg Hx        Social History     Socioeconomic History    Marital status:      Spouse name: Not on file    Number of children: Not on file    Years of education: Not on file    Highest education level: Master's degree (e.g., MA, MS, Kat, MEd, MSW, CRISTIANE)   Occupational History    Not on file   Tobacco Use    Smoking status: Never    Smokeless tobacco: Never   Vaping Use    Vaping Use: Never used   Substance and Sexual Activity    Alcohol use: No    Drug use: No    Sexual activity: Yes     Partners: Male     Birth control/protection: Female Sterilization     Comment: 3 boys   Other Topics Concern    Not on file   Social History Narrative    Not on file     Social Determinants of Health     Financial Resource Strain: Low Risk  (2/27/2024)    Overall Financial Resource Strain (CARDIA)     Difficulty of Paying Living  Expenses: Not hard at all   Food Insecurity: No Food Insecurity (2/27/2024)    Hunger Vital Sign     Worried About Running Out of Food in the Last Year: Never true     Ran Out of Food in the Last Year: Never true   Transportation Needs: No Transportation Needs (2/27/2024)    PRAPARE - Transportation     Lack of Transportation (Medical): No     Lack of Transportation (Non-Medical): No   Physical Activity: Insufficiently Active (2/27/2024)    Exercise Vital Sign     Days of Exercise per Week: 3 days     Minutes of Exercise per Session: 30 min   Stress: Stress Concern Present (2/27/2024)    [employer] [place] [employer] - Occupational Stress Questionnaire     Feeling of Stress : To some extent   Social Connections: Socially Integrated (2/27/2024)    Social Connection and Isolation Panel [NHANES]     Frequency of Communication with Friends and Family: More than three times a week     Frequency of Social Gatherings with Friends and Family: More than three times a week     Attends Scientologist Services: 1 to 4 times per year     Active Member of Clubs or Organizations: Yes     Attends Club or Organization Meetings: More than 4 times per year     Marital Status:    Intimate Partner Violence: Not on file   Housing Stability: Low Risk  (2/27/2024)    Housing Stability Vital Sign     Unable to Pay for Housing in the Last Year: No     Number of Places Lived in the Last Year: 1     Unstable Housing in the Last Year: No       Patient Active Problem List    Diagnosis Date Noted    Female hypogonadism syndrome 07/20/2022    Vitamin B12 deficiency (non anemic) 07/19/2022    Vitamin D deficiency 07/19/2022    Hypokalemia 01/17/2022    Pure hypercholesterolemia 01/20/2020    Obesity (BMI 30-39.9) 08/09/2017    Hypertension 01/23/2015         Current Outpatient Medications   Medication Sig Dispense Refill    clindamycin (CLEOCIN-T) 1 % Gel APPLY A THIN LAYER TO THE AFFECTED AREA(S) BY TOPICAL ROUTE 2 TIMES PER DAY 30 g  "2    omeprazole (PRILOSEC) 20 MG delayed-release capsule Take 1 Capsule by mouth every day. 90 Capsule 3    triamterene-hctz (MAXZIDE-25/DYAZIDE) 37.5-25 MG Tab Take 1 Tablet by mouth every morning. 90 Tablet 3    potassium chloride SA (KDUR) 20 MEQ Tab CR Take 1 Tablet by mouth every day. (Patient taking differently: Take 20 mEq by mouth 2 times a day.) 90 Tablet 3    Chlorpheniramine Maleate (ALLERGY PO) Take 1 Tablet by mouth every day.       No current facility-administered medications for this visit.     Allergies   Allergen Reactions    Augmentin Rash     rash       Review of Systems   Constitutional: Negative for fever, chills  HENT: Negative for congestion.    Eyes: Negative for pain.    Respiratory: Negative for shortness of breath  Cardiovascular: Negative for leg swelling.   Gastrointestinal: Negative for abdominal pain  Genitourinary: Negative for hematuria.   Skin: Negative for rash.   Neurological: Negative for dizziness.   Endo/Heme/Allergies: Does not bleed easily.   Psychiatric/Behavioral: Negative for depression.  The patient is not nervous/anxious.      Objective:     /80 (BP Location: Left arm, Patient Position: Sitting, BP Cuff Size: Adult)   Pulse 77   Temp 36.9 °C (98.4 °F) (Temporal)   Ht 1.6 m (5' 3\")   Wt 79.7 kg (175 lb 9.6 oz)   LMP 01/01/2019 (Approximate)   SpO2 98%   BMI 31.11 kg/m²   Body mass index is 31.11 kg/m².  Wt Readings from Last 4 Encounters:   02/28/24 79.7 kg (175 lb 9.6 oz)   08/14/23 90.6 kg (199 lb 11.8 oz)   07/15/23 90.7 kg (200 lb)   02/27/23 88 kg (194 lb)     Physical Exam:  Constitutional: Well-developed and well-nourished. Not diaphoretic. No distress.   Skin: Skin is warm and dry. No rash noted.  Head: Atraumatic without lesions.  Eyes: Conjunctivae and extraocular motions are normal. Pupils are equal, round, and reactive to light. No scleral icterus.   Ears:  External ears unremarkable. Tympanic membranes clear and intact.  Mouth/Throat: Dentition " is good. Tongue normal. Oropharynx is clear and moist. Posterior pharynx without erythema or exudates.  Neck: Supple, trachea midline. Normal range of motion. No thyromegaly present. No lymphadenopathy--cervical or supraclavicular.  Cardiovascular: Regular rate and rhythm, S1 and S2 without murmur, rubs, or gallops.  Lungs: Normal inspiratory effort, CTA bilaterally, no wheezes/rhonchi/rales  Abdomen: Soft, non tender, and without distention. Active bowel sounds in all four quadrants. No rebound, guarding, masses or HSM.  Extremities: No cyanosis, clubbing, erythema, nor edema. Distal pulses intact and symmetric.   Musculoskeletal: All major joints AROM full in all directions without pain.  Neurological: Alert and oriented x 3. DTRs 2+/3 and symmetric. No cranial nerve deficit. 5/5 myotomes. Sensation intact.   Psychiatric:  Behavior, mood, and affect are appropriate.    Assessment and Plan:     1. Wellness examination  Comp Metabolic Panel    Lipid Profile      2. Vitamin D deficiency  VITAMIN D,25 HYDROXY (DEFICIENCY)      3. Vitamin B12 deficiency (non anemic)  VITAMIN B12      4. Obesity (BMI 30-39.9)  Patient identified as having weight management issue.  Appropriate orders and counseling given.          HCM:  Up to date   Labs per orders  Immunizations per orders  Patient counseled about skin care, diet, supplements, prenatal vitamins, safe sex and exercise.    Follow-up: Return in about 1 year (around 2/28/2025) for Annual/wellness visit.

## 2024-03-12 PROCEDURE — RXMED WILLOW AMBULATORY MEDICATION CHARGE: Performed by: FAMILY MEDICINE

## 2024-03-12 RX ORDER — POTASSIUM CHLORIDE 20 MEQ/1
20 TABLET, EXTENDED RELEASE ORAL DAILY
Qty: 90 TABLET | Refills: 3 | Status: SHIPPED | OUTPATIENT
Start: 2024-03-12

## 2024-03-12 NOTE — TELEPHONE ENCOUNTER
Received request via: Pharmacy    Was the patient seen in the last year in this department? Yes    Does the patient have an active prescription (recently filled or refills available) for medication(s) requested? No    Pharmacy Name: [facility] - [place] [phone]     Does the patient have halfway Plus and need 100 day supply (blood pressure, diabetes and cholesterol meds only)? Patient does not have SCP

## 2024-03-15 ENCOUNTER — PHARMACY VISIT (OUTPATIENT)
Dept: PHARMACY | Facility: MEDICAL CENTER | Age: 44
End: 2024-03-15
Payer: COMMERCIAL

## 2024-04-17 PROCEDURE — RXMED WILLOW AMBULATORY MEDICATION CHARGE: Performed by: FAMILY MEDICINE

## 2024-04-19 ENCOUNTER — PHARMACY VISIT (OUTPATIENT)
Dept: PHARMACY | Facility: MEDICAL CENTER | Age: 44
End: 2024-04-19
Payer: COMMERCIAL

## 2024-04-20 ENCOUNTER — HOSPITAL ENCOUNTER (OUTPATIENT)
Dept: LAB | Facility: MEDICAL CENTER | Age: 44
End: 2024-04-20
Attending: FAMILY MEDICINE
Payer: COMMERCIAL

## 2024-04-20 DIAGNOSIS — E55.9 VITAMIN D DEFICIENCY: ICD-10-CM

## 2024-04-20 DIAGNOSIS — E53.8 VITAMIN B12 DEFICIENCY (NON ANEMIC): ICD-10-CM

## 2024-04-20 DIAGNOSIS — Z00.00 WELLNESS EXAMINATION: ICD-10-CM

## 2024-04-20 LAB
25(OH)D3 SERPL-MCNC: 33 NG/ML (ref 30–100)
ALBUMIN SERPL BCP-MCNC: 4.1 G/DL (ref 3.2–4.9)
ALBUMIN/GLOB SERPL: 1.3 G/DL
ALP SERPL-CCNC: 59 U/L (ref 30–99)
ALT SERPL-CCNC: 7 U/L (ref 2–50)
ANION GAP SERPL CALC-SCNC: 13 MMOL/L (ref 7–16)
AST SERPL-CCNC: 16 U/L (ref 12–45)
BILIRUB SERPL-MCNC: 0.3 MG/DL (ref 0.1–1.5)
BUN SERPL-MCNC: 11 MG/DL (ref 8–22)
CALCIUM ALBUM COR SERPL-MCNC: 9.5 MG/DL (ref 8.5–10.5)
CALCIUM SERPL-MCNC: 9.6 MG/DL (ref 8.5–10.5)
CHLORIDE SERPL-SCNC: 101 MMOL/L (ref 96–112)
CHOLEST SERPL-MCNC: 189 MG/DL (ref 100–199)
CO2 SERPL-SCNC: 24 MMOL/L (ref 20–33)
CREAT SERPL-MCNC: 0.98 MG/DL (ref 0.5–1.4)
FASTING STATUS PATIENT QL REPORTED: NORMAL
GFR SERPLBLD CREATININE-BSD FMLA CKD-EPI: 73 ML/MIN/1.73 M 2
GLOBULIN SER CALC-MCNC: 3.1 G/DL (ref 1.9–3.5)
GLUCOSE SERPL-MCNC: 84 MG/DL (ref 65–99)
HDLC SERPL-MCNC: 37 MG/DL
LDLC SERPL CALC-MCNC: 135 MG/DL
POTASSIUM SERPL-SCNC: 3.5 MMOL/L (ref 3.6–5.5)
PROT SERPL-MCNC: 7.2 G/DL (ref 6–8.2)
SODIUM SERPL-SCNC: 138 MMOL/L (ref 135–145)
TRIGL SERPL-MCNC: 86 MG/DL (ref 0–149)
VIT B12 SERPL-MCNC: 479 PG/ML (ref 211–911)

## 2024-04-20 PROCEDURE — 82607 VITAMIN B-12: CPT

## 2024-04-20 PROCEDURE — 80053 COMPREHEN METABOLIC PANEL: CPT

## 2024-04-20 PROCEDURE — 36415 COLL VENOUS BLD VENIPUNCTURE: CPT

## 2024-04-20 PROCEDURE — 82306 VITAMIN D 25 HYDROXY: CPT

## 2024-04-20 PROCEDURE — 80061 LIPID PANEL: CPT

## 2024-04-29 PROCEDURE — RXMED WILLOW AMBULATORY MEDICATION CHARGE: Performed by: PHYSICIAN ASSISTANT

## 2024-04-29 PROCEDURE — RXMED WILLOW AMBULATORY MEDICATION CHARGE: Performed by: FAMILY MEDICINE

## 2024-05-03 ENCOUNTER — PHARMACY VISIT (OUTPATIENT)
Dept: PHARMACY | Facility: MEDICAL CENTER | Age: 44
End: 2024-05-03
Payer: COMMERCIAL

## 2024-06-19 PROCEDURE — RXMED WILLOW AMBULATORY MEDICATION CHARGE: Performed by: FAMILY MEDICINE

## 2024-06-19 RX ORDER — TRIAMTERENE AND HYDROCHLOROTHIAZIDE 37.5; 25 MG/1; MG/1
1 TABLET ORAL EVERY MORNING
Qty: 90 TABLET | Refills: 3 | Status: SHIPPED | OUTPATIENT
Start: 2024-06-19

## 2024-06-22 ENCOUNTER — PHARMACY VISIT (OUTPATIENT)
Dept: PHARMACY | Facility: MEDICAL CENTER | Age: 44
End: 2024-06-22
Payer: COMMERCIAL

## 2024-07-25 PROCEDURE — RXMED WILLOW AMBULATORY MEDICATION CHARGE: Performed by: FAMILY MEDICINE

## 2024-07-25 RX ORDER — OMEPRAZOLE 20 MG/1
20 CAPSULE, DELAYED RELEASE ORAL
Qty: 90 CAPSULE | Refills: 3 | Status: SHIPPED | OUTPATIENT
Start: 2024-07-25

## 2024-07-28 ENCOUNTER — PHARMACY VISIT (OUTPATIENT)
Dept: PHARMACY | Facility: MEDICAL CENTER | Age: 44
End: 2024-07-28
Payer: COMMERCIAL

## 2024-08-09 ENCOUNTER — HOSPITAL ENCOUNTER (OUTPATIENT)
Dept: LAB | Facility: MEDICAL CENTER | Age: 44
End: 2024-08-09
Attending: OBSTETRICS & GYNECOLOGY
Payer: COMMERCIAL

## 2024-08-09 LAB — TSH SERPL DL<=0.005 MIU/L-ACNC: 3.96 UIU/ML (ref 0.38–5.33)

## 2024-08-09 PROCEDURE — 36415 COLL VENOUS BLD VENIPUNCTURE: CPT

## 2024-08-09 PROCEDURE — 84443 ASSAY THYROID STIM HORMONE: CPT

## 2024-08-30 ENCOUNTER — OFFICE VISIT (OUTPATIENT)
Dept: URGENT CARE | Facility: CLINIC | Age: 44
End: 2024-08-30
Payer: COMMERCIAL

## 2024-08-30 VITALS
HEART RATE: 66 BPM | WEIGHT: 168 LBS | HEIGHT: 65 IN | SYSTOLIC BLOOD PRESSURE: 122 MMHG | BODY MASS INDEX: 27.99 KG/M2 | DIASTOLIC BLOOD PRESSURE: 82 MMHG | OXYGEN SATURATION: 99 % | RESPIRATION RATE: 14 BRPM | TEMPERATURE: 97.6 F
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
## 2024-08-30 DIAGNOSIS — Z23 NEED FOR VACCINATION: ICD-10-CM

## 2024-08-30 RX ORDER — CLINDAMYCIN HCL 300 MG
300 CAPSULE ORAL 3 TIMES DAILY
Qty: 15 CAPSULE | Refills: 0 | Status: SHIPPED | OUTPATIENT
Start: 2024-08-30 | End: 2024-09-04

## 2024-08-30 NOTE — PROGRESS NOTES
"Subjective:   Halle Velasquez is a 44 y.o. female who presents for Dog Bite (X 1 day, dog bite of Rt index finger, slight redness.)      HPI  Yesterday bit by dog in right index finger. Dog is fully vaccinated. No numbness tingling. Redness, mild discomfort.  Normal joint range of motion.  Last tetanus in 2016.    ROS per HPI    Medications, Allergies, and current problem list reviewed today in Epic.     Objective:     /82   Pulse 66   Temp 36.4 °C (97.6 °F) (Temporal)   Resp 14   Ht 1.651 m (5' 5\")   Wt 76.2 kg (168 lb)   SpO2 99%     Physical Exam  Vitals and nursing note reviewed.   Constitutional:       Appearance: She is not ill-appearing or toxic-appearing.   HENT:      Head: Normocephalic.   Eyes:      Pupils: Pupils are equal, round, and reactive to light.   Cardiovascular:      Rate and Rhythm: Normal rate.   Pulmonary:      Effort: Pulmonary effort is normal.   Musculoskeletal:      Comments: Single puncture right index finger.  There is some erythema but no purulent drainage.  No streaking up arm.  NVID.  Normal ROM.   Neurological:      General: No focal deficit present.      Mental Status: She is alert.   Psychiatric:         Mood and Affect: Mood normal.         Assessment/Plan:     I personally reviewed prior external notes and test results pertinent to today's visit as well as additional imaging and testing completed in clinic today.    I introduced myself as Roderick Lopez a Nurse Practitioner.    1. Dog bite, initial encounter  clindamycin (CLEOCIN) 300 MG Cap    TD Preservative Free =>8yo IM    CANCELED: Tdap =>8yo IM      2. Need for vaccination  clindamycin (CLEOCIN) 300 MG Cap    TD Preservative Free =>8yo IM    CANCELED: Tdap =>8yo IM      Dog bite to right index finger yesterday.  There is some erythema and tenderness, no fluctuance or streaking up the arm.  ROM intact.  NVID.  Tetanus last given in 2018 so we will update this today.  Discussed likely not needing " antibiotics however there is some erythema so we will send clindamycin given allergy to Augmentin.  Did discuss watchful waiting and wound care.    Medication discussed included indication for use and the potential benefits and side effects. The Patient was encouraged to monitor symptoms closely, and we reviewed the signs and symptoms that require a higher level of care through the emergency department. Patient verbalized understanding.    Please note that this dictation was created using voice recognition software. I have made every reasonable attempt to correct obvious errors, but I expect that there are errors of grammar and possibly content that I did not discover before finalizing the note.    This note was electronically signed by KOREY Dewitt

## 2024-09-04 ENCOUNTER — APPOINTMENT (OUTPATIENT)
Dept: RADIOLOGY | Facility: IMAGING CENTER | Age: 44
End: 2024-09-04
Attending: PHYSICIAN ASSISTANT
Payer: COMMERCIAL

## 2024-09-04 ENCOUNTER — OFFICE VISIT (OUTPATIENT)
Dept: URGENT CARE | Facility: CLINIC | Age: 44
End: 2024-09-04
Payer: COMMERCIAL

## 2024-09-04 VITALS
SYSTOLIC BLOOD PRESSURE: 128 MMHG | BODY MASS INDEX: 30.51 KG/M2 | RESPIRATION RATE: 12 BRPM | WEIGHT: 172.2 LBS | HEIGHT: 63 IN | DIASTOLIC BLOOD PRESSURE: 76 MMHG | OXYGEN SATURATION: 96 % | HEART RATE: 76 BPM | TEMPERATURE: 97.3 F

## 2024-09-04 DIAGNOSIS — S20.219A CONTUSION OF CHEST WALL, UNSPECIFIED LATERALITY, INITIAL ENCOUNTER: ICD-10-CM

## 2024-09-04 DIAGNOSIS — R07.89 STERNUM PAIN: ICD-10-CM
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
## 2024-09-04 PROCEDURE — 71120 X-RAY EXAM BREASTBONE 2/>VWS: CPT | Mod: TC | Performed by: PHYSICIAN ASSISTANT

## 2024-09-04 PROCEDURE — 99213 OFFICE O/P EST LOW 20 MIN: CPT | Performed by: PHYSICIAN ASSISTANT

## 2024-09-04 PROCEDURE — 3078F DIAST BP <80 MM HG: CPT | Performed by: PHYSICIAN ASSISTANT

## 2024-09-04 PROCEDURE — 3074F SYST BP LT 130 MM HG: CPT | Performed by: PHYSICIAN ASSISTANT

## 2024-09-04 RX ORDER — IBUPROFEN 200 MG
600 TABLET ORAL EVERY 6 HOURS PRN
COMMUNITY

## 2024-09-04 ASSESSMENT — ENCOUNTER SYMPTOMS
VOMITING: 0
SENSORY CHANGE: 0
CHILLS: 0
WEAKNESS: 0
DOUBLE VISION: 0
NECK PAIN: 1
ABDOMINAL PAIN: 0
HEADACHES: 0
DIZZINESS: 0
BACK PAIN: 0
TINGLING: 0
NAUSEA: 0
FEVER: 0
HEMOPTYSIS: 0
SPUTUM PRODUCTION: 0
COUGH: 0
FOCAL WEAKNESS: 0
PALPITATIONS: 0
SHORTNESS OF BREATH: 0
BLURRED VISION: 0
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
## 2024-09-18 PROCEDURE — RXMED WILLOW AMBULATORY MEDICATION CHARGE: Performed by: FAMILY MEDICINE

## 2024-09-23 ENCOUNTER — PHARMACY VISIT (OUTPATIENT)
Dept: PHARMACY | Facility: MEDICAL CENTER | Age: 44
End: 2024-09-23
Payer: COMMERCIAL

## 2024-09-23 RX ORDER — CLINDAMYCIN PHOSPHATE 10 MG/G
GEL TOPICAL
Qty: 30 G | Refills: 2 | Status: CANCELLED | OUTPATIENT
Start: 2024-09-18

## 2024-09-30 ENCOUNTER — IMMUNIZATION (OUTPATIENT)
Dept: OCCUPATIONAL MEDICINE | Facility: CLINIC | Age: 44
End: 2024-09-30

## 2024-09-30 DIAGNOSIS — Z23 NEED FOR VACCINATION: Primary | ICD-10-CM

## 2024-09-30 PROCEDURE — 90656 IIV3 VACC NO PRSV 0.5 ML IM: CPT | Performed by: PREVENTIVE MEDICINE

## 2024-10-10 PROCEDURE — RXMED WILLOW AMBULATORY MEDICATION CHARGE: Performed by: INTERNAL MEDICINE

## 2024-10-15 ENCOUNTER — PHARMACY VISIT (OUTPATIENT)
Dept: PHARMACY | Facility: MEDICAL CENTER | Age: 44
End: 2024-10-15
Payer: COMMERCIAL

## 2024-10-29 PROCEDURE — RXMED WILLOW AMBULATORY MEDICATION CHARGE: Performed by: FAMILY MEDICINE

## 2024-11-01 ENCOUNTER — PHARMACY VISIT (OUTPATIENT)
Dept: PHARMACY | Facility: MEDICAL CENTER | Age: 44
End: 2024-11-01
Payer: COMMERCIAL

## 2024-11-01 PROCEDURE — RXMED WILLOW AMBULATORY MEDICATION CHARGE: Performed by: INTERNAL MEDICINE

## 2024-11-01 RX ORDER — COVID-19 VACCINE, MRNA 0.04 MG/.418ML
INJECTION, SUSPENSION INTRAMUSCULAR
Qty: 0.3 ML | Refills: 0 | OUTPATIENT
Start: 2024-11-01

## 2024-11-20 ENCOUNTER — APPOINTMENT (OUTPATIENT)
Dept: URGENT CARE | Facility: CLINIC | Age: 44
End: 2024-11-20
Payer: COMMERCIAL

## 2024-12-16 ENCOUNTER — HOSPITAL ENCOUNTER (OUTPATIENT)
Dept: RADIOLOGY | Facility: MEDICAL CENTER | Age: 44
End: 2024-12-16
Attending: FAMILY MEDICINE
Payer: COMMERCIAL

## 2024-12-16 DIAGNOSIS — Z12.31 VISIT FOR SCREENING MAMMOGRAM: ICD-10-CM

## 2024-12-16 PROCEDURE — 77067 SCR MAMMO BI INCL CAD: CPT

## 2024-12-23 PROCEDURE — RXMED WILLOW AMBULATORY MEDICATION CHARGE: Performed by: FAMILY MEDICINE

## 2024-12-26 ENCOUNTER — PHARMACY VISIT (OUTPATIENT)
Dept: PHARMACY | Facility: MEDICAL CENTER | Age: 44
End: 2024-12-26
Payer: COMMERCIAL

## 2025-01-08 PROCEDURE — RXMED WILLOW AMBULATORY MEDICATION CHARGE: Performed by: PHYSICIAN ASSISTANT

## 2025-01-14 ENCOUNTER — PHARMACY VISIT (OUTPATIENT)
Dept: PHARMACY | Facility: MEDICAL CENTER | Age: 45
End: 2025-01-14
Payer: COMMERCIAL

## 2025-01-30 PROCEDURE — RXMED WILLOW AMBULATORY MEDICATION CHARGE: Performed by: FAMILY MEDICINE

## 2025-02-03 ENCOUNTER — PHARMACY VISIT (OUTPATIENT)
Dept: PHARMACY | Facility: MEDICAL CENTER | Age: 45
End: 2025-02-03
Payer: COMMERCIAL

## 2025-02-25 SDOH — HEALTH STABILITY: PHYSICAL HEALTH: ON AVERAGE, HOW MANY DAYS PER WEEK DO YOU ENGAGE IN MODERATE TO STRENUOUS EXERCISE (LIKE A BRISK WALK)?: 3 DAYS

## 2025-02-25 SDOH — ECONOMIC STABILITY: FOOD INSECURITY: WITHIN THE PAST 12 MONTHS, YOU WORRIED THAT YOUR FOOD WOULD RUN OUT BEFORE YOU GOT MONEY TO BUY MORE.: NEVER TRUE

## 2025-02-25 SDOH — ECONOMIC STABILITY: FOOD INSECURITY: WITHIN THE PAST 12 MONTHS, THE FOOD YOU BOUGHT JUST DIDN'T LAST AND YOU DIDN'T HAVE MONEY TO GET MORE.: NEVER TRUE

## 2025-02-25 SDOH — HEALTH STABILITY: PHYSICAL HEALTH: ON AVERAGE, HOW MANY MINUTES DO YOU ENGAGE IN EXERCISE AT THIS LEVEL?: 10 MIN

## 2025-02-25 SDOH — ECONOMIC STABILITY: INCOME INSECURITY: IN THE LAST 12 MONTHS, WAS THERE A TIME WHEN YOU WERE NOT ABLE TO PAY THE MORTGAGE OR RENT ON TIME?: NO

## 2025-02-25 SDOH — ECONOMIC STABILITY: INCOME INSECURITY: HOW HARD IS IT FOR YOU TO PAY FOR THE VERY BASICS LIKE FOOD, HOUSING, MEDICAL CARE, AND HEATING?: NOT HARD AT ALL

## 2025-02-25 ASSESSMENT — LIFESTYLE VARIABLES
HOW MANY STANDARD DRINKS CONTAINING ALCOHOL DO YOU HAVE ON A TYPICAL DAY: PATIENT DOES NOT DRINK
HOW OFTEN DO YOU HAVE SIX OR MORE DRINKS ON ONE OCCASION: NEVER
SKIP TO QUESTIONS 9-10: 1
AUDIT-C TOTAL SCORE: 0
HOW OFTEN DO YOU HAVE A DRINK CONTAINING ALCOHOL: NEVER

## 2025-02-25 ASSESSMENT — SOCIAL DETERMINANTS OF HEALTH (SDOH)
HOW OFTEN DO YOU GET TOGETHER WITH FRIENDS OR RELATIVES?: TWICE A WEEK
HOW OFTEN DO YOU HAVE SIX OR MORE DRINKS ON ONE OCCASION: NEVER
HOW OFTEN DO YOU HAVE A DRINK CONTAINING ALCOHOL: NEVER
WITHIN THE PAST 12 MONTHS, YOU WORRIED THAT YOUR FOOD WOULD RUN OUT BEFORE YOU GOT THE MONEY TO BUY MORE: NEVER TRUE
IN A TYPICAL WEEK, HOW MANY TIMES DO YOU TALK ON THE PHONE WITH FAMILY, FRIENDS, OR NEIGHBORS?: MORE THAN THREE TIMES A WEEK
HOW OFTEN DO YOU ATTEND CHURCH OR RELIGIOUS SERVICES?: 1 TO 4 TIMES PER YEAR
HOW OFTEN DO YOU ATTENT MEETINGS OF THE CLUB OR ORGANIZATION YOU BELONG TO?: MORE THAN 4 TIMES PER YEAR
IN A TYPICAL WEEK, HOW MANY TIMES DO YOU TALK ON THE PHONE WITH FAMILY, FRIENDS, OR NEIGHBORS?: MORE THAN THREE TIMES A WEEK
HOW OFTEN DO YOU ATTENT MEETINGS OF THE CLUB OR ORGANIZATION YOU BELONG TO?: MORE THAN 4 TIMES PER YEAR
HOW HARD IS IT FOR YOU TO PAY FOR THE VERY BASICS LIKE FOOD, HOUSING, MEDICAL CARE, AND HEATING?: NOT HARD AT ALL
IN THE PAST 12 MONTHS, HAS THE ELECTRIC, GAS, OIL, OR WATER COMPANY THREATENED TO SHUT OFF SERVICE IN YOUR HOME?: NO
DO YOU BELONG TO ANY CLUBS OR ORGANIZATIONS SUCH AS CHURCH GROUPS UNIONS, FRATERNAL OR ATHLETIC GROUPS, OR SCHOOL GROUPS?: YES
HOW MANY DRINKS CONTAINING ALCOHOL DO YOU HAVE ON A TYPICAL DAY WHEN YOU ARE DRINKING: PATIENT DOES NOT DRINK
DO YOU BELONG TO ANY CLUBS OR ORGANIZATIONS SUCH AS CHURCH GROUPS UNIONS, FRATERNAL OR ATHLETIC GROUPS, OR SCHOOL GROUPS?: YES
HOW OFTEN DO YOU ATTEND CHURCH OR RELIGIOUS SERVICES?: 1 TO 4 TIMES PER YEAR
HOW OFTEN DO YOU GET TOGETHER WITH FRIENDS OR RELATIVES?: TWICE A WEEK

## 2025-02-26 ENCOUNTER — PHARMACY VISIT (OUTPATIENT)
Dept: PHARMACY | Facility: MEDICAL CENTER | Age: 45
End: 2025-02-26
Payer: COMMERCIAL

## 2025-02-26 ENCOUNTER — OFFICE VISIT (OUTPATIENT)
Dept: URGENT CARE | Facility: CLINIC | Age: 45
End: 2025-02-26
Payer: COMMERCIAL

## 2025-02-26 ENCOUNTER — HOSPITAL ENCOUNTER (OUTPATIENT)
Facility: MEDICAL CENTER | Age: 45
End: 2025-02-26
Payer: COMMERCIAL

## 2025-02-26 VITALS
DIASTOLIC BLOOD PRESSURE: 82 MMHG | HEIGHT: 62 IN | SYSTOLIC BLOOD PRESSURE: 124 MMHG | TEMPERATURE: 97.7 F | HEART RATE: 71 BPM | OXYGEN SATURATION: 97 % | WEIGHT: 168 LBS | BODY MASS INDEX: 30.91 KG/M2 | RESPIRATION RATE: 16 BRPM

## 2025-02-26 DIAGNOSIS — N12 PYELONEPHRITIS: ICD-10-CM

## 2025-02-26 DIAGNOSIS — R30.0 DYSURIA: ICD-10-CM

## 2025-02-26 LAB
APPEARANCE UR: CLEAR
BILIRUB UR STRIP-MCNC: NORMAL MG/DL
COLOR UR AUTO: YELLOW
GLUCOSE UR STRIP.AUTO-MCNC: NORMAL MG/DL
KETONES UR STRIP.AUTO-MCNC: NORMAL MG/DL
LEUKOCYTE ESTERASE UR QL STRIP.AUTO: NORMAL
NITRITE UR QL STRIP.AUTO: NORMAL
PH UR STRIP.AUTO: 6 [PH] (ref 5–8)
PROT UR QL STRIP: 100 MG/DL
RBC UR QL AUTO: NORMAL
SP GR UR STRIP.AUTO: 1.01
UROBILINOGEN UR STRIP-MCNC: 0.2 MG/DL

## 2025-02-26 PROCEDURE — 87086 URINE CULTURE/COLONY COUNT: CPT

## 2025-02-26 PROCEDURE — 87077 CULTURE AEROBIC IDENTIFY: CPT

## 2025-02-26 PROCEDURE — RXOTC WILLOW AMBULATORY OTC CHARGE

## 2025-02-26 PROCEDURE — RXMED WILLOW AMBULATORY MEDICATION CHARGE

## 2025-02-26 RX ORDER — CEFTRIAXONE 500 MG/1
1000 INJECTION, POWDER, FOR SOLUTION INTRAMUSCULAR; INTRAVENOUS ONCE
Status: COMPLETED | OUTPATIENT
Start: 2025-02-26 | End: 2025-02-26

## 2025-02-26 RX ORDER — CIPROFLOXACIN 500 MG/1
500 TABLET, FILM COATED ORAL EVERY 12 HOURS
Qty: 14 TABLET | Refills: 0 | Status: SHIPPED | OUTPATIENT
Start: 2025-02-26 | End: 2025-03-05

## 2025-02-26 RX ADMIN — Medication 2.1 ML: at 16:43

## 2025-02-26 RX ADMIN — CEFTRIAXONE 1000 MG: 500 INJECTION, POWDER, FOR SOLUTION INTRAMUSCULAR; INTRAVENOUS at 16:42

## 2025-02-26 ASSESSMENT — ENCOUNTER SYMPTOMS
VOMITING: 0
FLANK PAIN: 1
FEVER: 0
COUGH: 0
NAUSEA: 0

## 2025-02-27 NOTE — PROGRESS NOTES
Subjective:     CHIEF COMPLAINT  Chief Complaint   Patient presents with    UTI     Frequency, burning, pressure, left lower flank pain x 2 days       HPI  Halle Velasquez is a very pleasant 44 y.o. female who presents with painful urination, increased urinary urgency, and increased urinary frequency that first started yesterday.  She tried to manage her symptoms at home with increased hydration and monitoring.  She reported temporary improvement in symptoms yesterday evening, but her symptoms seemed to worsen today and she has developed left flank pain with tenderness.  She does report a history of pyelonephritis as a teenager with similar symptoms.  She has not had any nausea, vomiting, or fevers.  She has been feeling fatigued today.    REVIEW OF SYSTEMS  Review of Systems   Constitutional:  Positive for malaise/fatigue. Negative for fever.   HENT:  Negative for congestion.    Respiratory:  Negative for cough.    Gastrointestinal:  Negative for nausea and vomiting.   Genitourinary:  Positive for dysuria, flank pain (L), frequency and urgency.       PAST MEDICAL HISTORY  Patient Active Problem List    Diagnosis Date Noted    Female hypogonadism syndrome 07/20/2022    Vitamin B12 deficiency (non anemic) 07/19/2022    Vitamin D deficiency 07/19/2022    Hypokalemia 01/17/2022    Pure hypercholesterolemia 01/20/2020    Obesity (BMI 30-39.9) 08/09/2017    Hypertension 01/23/2015       SURGICAL HISTORY   has a past surgical history that includes primary c section; repeat c section w tubal ligation (06/01/2016); tonsillectomy; lymph node excision; tube & ectopic preg., removal; eye surgery; vein ligation; cystoscopy (N/A, 08/22/2019); vaginal hysterectomy scope total (N/A, 08/22/2019); salpingectomy (Bilateral, 08/22/2019); open reduction; tubal coagulation laparoscopic bilateral; eye surgery (Left, 02/22/2023); and excision, mass, finger (8/14/2023).    ALLERGIES  Allergies   Allergen Reactions    Augmentin  "Rash     rash       CURRENT MEDICATIONS  Home Medications       Reviewed by Greta Ibarra P.A.-C. (Physician Assistant) on 02/26/25 at 1621  Med List Status: <None>     Medication Last Dose Status   Chlorpheniramine Maleate (ALLERGY PO) Taking Active   clindamycin 1 % Gel Taking Active   COVID-19 mRNA Vac-Tomasz,Pfizer, (COMIRNATY) 30 MCG/0.3ML Suspension Prefilled Syringe injection Taking Active   ibuprofen (MOTRIN) 200 MG Tab PRN Active   omeprazole (PRILOSEC) 20 MG delayed-release capsule Taking Active   polyethylene glycol-electrolytes (GOLYTELY) 236 GM Recon Soln  Active   potassium chloride SA (KDUR) 20 MEQ Tab CR Taking Active   triamterene-hctz (MAXZIDE-25/DYAZIDE) 37.5-25 MG Tab Taking Active                    SOCIAL HISTORY  Social History     Tobacco Use    Smoking status: Never    Smokeless tobacco: Never   Vaping Use    Vaping status: Never Used   Substance and Sexual Activity    Alcohol use: No    Drug use: No    Sexual activity: Yes     Partners: Male     Birth control/protection: Female Sterilization     Comment: 3 boys       FAMILY HISTORY  Family History   Problem Relation Age of Onset    Stroke Mother     Hypertension Mother     Hyperlipidemia Mother     Dementia Mother     Hypertension Father     Hyperlipidemia Father     Alcohol/Drug Father         EtOH    Cancer Father         kidney cancer    Alcohol abuse Father     Cancer Sister         skin    Stroke Paternal Uncle     Heart Disease Paternal Uncle     Heart Disease Paternal Grandfather     Diabetes Paternal Grandfather     Breast Cancer Neg Hx     Colorectal Cancer Neg Hx     Peritoneal Cancer Neg Hx     Ovarian Cancer Neg Hx     Tubal Cancer Neg Hx           Objective:     VITAL SIGNS: /82 (BP Location: Left arm, Patient Position: Sitting, BP Cuff Size: Adult)   Pulse 71   Temp 36.5 °C (97.7 °F) (Temporal)   Resp 16   Ht 1.58 m (5' 2.2\")   Wt 76.2 kg (168 lb)   LMP 01/01/2019 (Approximate)   SpO2 97%   BMI 30.53 kg/m² "     PHYSICAL EXAM  Physical Exam  Vitals reviewed.   Constitutional:       General: She is not in acute distress.     Appearance: Normal appearance. She is not ill-appearing or toxic-appearing.   HENT:      Head: Normocephalic and atraumatic.      Nose: Nose normal.      Mouth/Throat:      Mouth: Mucous membranes are moist.   Eyes:      Conjunctiva/sclera: Conjunctivae normal.   Cardiovascular:      Rate and Rhythm: Normal rate.   Pulmonary:      Effort: Pulmonary effort is normal.   Abdominal:      Tenderness: There is left CVA tenderness. There is no right CVA tenderness.   Skin:     General: Skin is warm and dry.      Coloration: Skin is not pale.   Neurological:      General: No focal deficit present.      Mental Status: She is alert.   Psychiatric:         Mood and Affect: Mood normal.         Assessment/Plan:     1. Dysuria  - POCT Urinalysis    2. Pyelonephritis  - URINE CULTURE(NEW); Future  - ciprofloxacin (CIPRO) 500 MG Tab; Take 1 Tablet by mouth every 12 hours for 7 days.  Dispense: 14 Tablet; Refill: 0  - cefTRIAXone (Rocephin) injection 1,000 mg  - lidocaine (Xylocaine) 1 % injection 2.1 mL  -Increase hydration  -Avoid excess caffeine/alcohol until UTI symptoms resolve  -Return to clinic if symptoms worsen or fail to resolve    MDM/Comments:  I have prepared for this visit by personally reviewing the patient's prevous medical records, vitals, and labs including: most recent GFR of 73.  Patient has stable vital signs and is nontoxic appearing.  Urinalysis obtained in office positive for blood and leukocytes.  Patient had left CVA tenderness on physical exam.  Patient has been initiated on a 7-day course of ciprofloxacin for pyelonephritis and has been provided 1 g of intramuscular ceftriaxone mixed with lidocaine.  Her urine has been sent for culture with results pending.  Patient does report a history of allergies to Augmentin with a rash, but has tolerated other cephalosporins including ceftriaxone in  the past.  Patient demonstrated understanding of the treatment plan at this time.  Strict ER precautions discussed if any worsening of symptoms.  Discussed the importance of hydration and avoidance of excess caffeine and alcohol until symptoms improve.  Patient will return to the clinic as needed.    Differential diagnosis, natural history, supportive care, and indications for immediate follow-up discussed. All questions answered. Patient agrees with the plan of care.    Follow-up as needed if symptoms worsen or fail to improve to PCP, Urgent care or Emergency Room.    I have personally reviewed prior external notes and test results pertinent to today's visit.  I have independently reviewed and interpreted all diagnostics ordered during this urgent care acute visit.   Discussed management options (risks,benefits, and alternatives to treatment). Pt expresses understanding and the treatment plan was agreed upon. Questions were encouraged and answered to pt's satisfaction.    Please note that this dictation was created using voice recognition software. I have made a reasonable attempt to correct obvious errors, but I expect that there are errors of grammar and possibly content that I did not discover before finalizing the note.

## 2025-02-28 ENCOUNTER — OFFICE VISIT (OUTPATIENT)
Dept: MEDICAL GROUP | Facility: PHYSICIAN GROUP | Age: 45
End: 2025-02-28
Payer: COMMERCIAL

## 2025-02-28 VITALS
DIASTOLIC BLOOD PRESSURE: 74 MMHG | SYSTOLIC BLOOD PRESSURE: 122 MMHG | BODY MASS INDEX: 30.64 KG/M2 | WEIGHT: 168.6 LBS | OXYGEN SATURATION: 98 % | TEMPERATURE: 98.2 F | HEART RATE: 69 BPM

## 2025-02-28 DIAGNOSIS — Z00.00 WELLNESS EXAMINATION: Primary | ICD-10-CM

## 2025-02-28 DIAGNOSIS — E78.00 PURE HYPERCHOLESTEROLEMIA: ICD-10-CM

## 2025-02-28 LAB
BACTERIA UR CULT: NORMAL
SIGNIFICANT IND 70042: NORMAL
SITE SITE: NORMAL
SOURCE SOURCE: NORMAL

## 2025-02-28 ASSESSMENT — PATIENT HEALTH QUESTIONNAIRE - PHQ9: CLINICAL INTERPRETATION OF PHQ2 SCORE: 0

## 2025-02-28 NOTE — PROGRESS NOTES
Subjective:     CC:   Chief Complaint   Patient presents with    Annual Exam       HPI:   Halle Velasquez is a 44 y.o. female who presents for annual exam. She is feeling well and denies any complaints.    Ob-Gyn/ History:    Patient has GYN provider: yes - Dr. Amato  /Para:  4/3  Last Pap Smear:  . No history of abnormal pap smears.  Gyn Surgery:   x3, tubal ligation, hysterectomy, salpingectomy  Current Contraceptive Method:  N/a. Yes currently sexually active.  Last menstrual period:  .  No significant bloating/fluid retention, pelvic pain, or dyspareunia. No vaginal discharge  Post-menopausal bleeding: no  Urinary incontinence: mild, stress - stable  Folate intake: n/a     Health Maintenance  Advanced directive: n/a   Osteoporosis Screen/ DEXA: n/a   PT/vit D for falls prevention: n/a   Cholesterol Screenin2024 - T chol 646586 TG 86, HDL 37, ; ASCVD 1.7%  Diabetes Screenin2024 - fasting glucose 84   Aspirin Use: n/a      Family History  Updated: yes  FHS-7 score: 0    Anticipatory Guidance  Diet: trying to do a healthy diet   Exercise: trying to be regular  Substance Abuse: No   Safe in relationship - .  Seat belts, bike helmet, gun safety discussed.  Sun protection used.  Dental home.     Cancer screening  Colorectal Cancer Screening: requesting records    Lung Cancer Screening: n/a - never smoker    Cervical Cancer Screening: n/a - s/p hysterectomy  Breast Cancer Screening: due 2025    Infectious disease screening/Immunizations  --STI Screening: declined  --Practices safe sex.  --HIV Screening: completed - negative   --Hepatitis C Screening: completed - negative (2021)  --Immunizations:    Influenza: completed    HPV:  n/a    Tetanus: due     Shingles: n/a    Pneumococcal: n/a    Hepatitis B: reports completed   COVID-19: completed   Other immunizations: n/a     She  has a past medical history of Anesthesia (2019), Heart burn, [place]  hernia syndrome (2021), High cholesterol (08/19/2019), Hypertension, Pneumonia (12/2008), and PONV (postoperative nausea and vomiting) (2002).  She  has a past surgical history that includes primary c section; repeat c section w tubal ligation (06/01/2016); tonsillectomy; lymph node excision; tube & ectopic preg., removal; eye surgery; vein ligation; cystoscopy (N/A, 08/22/2019); vaginal hysterectomy scope total (N/A, 08/22/2019); salpingectomy (Bilateral, 08/22/2019); open reduction; tubal coagulation laparoscopic bilateral; eye surgery (Left, 02/22/2023); excision, mass, finger (08/14/2023); and hand surgery (1999, 2023).    Family History   Problem Relation Age of Onset    Stroke Mother     Hypertension Mother     Hyperlipidemia Mother     Dementia Mother     Hypertension Father     Hyperlipidemia Father     Cancer Father         kidney cancer    Alcohol abuse Father     Lung Disease Father         COPD-Smoker    Cancer Sister         skin    Alcohol abuse Sister     Stroke Paternal Uncle     Heart Disease Paternal Uncle     Stroke Maternal Grandmother     Heart Disease Paternal Grandfather     Diabetes Paternal Grandfather     Breast Cancer Neg Hx     Colorectal Cancer Neg Hx     Peritoneal Cancer Neg Hx     Ovarian Cancer Neg Hx     Tubal Cancer Neg Hx        Social History     Socioeconomic History    Marital status:      Spouse name: Not on file    Number of children: Not on file    Years of education: Not on file    Highest education level: Master's degree (e.g., MA, MS, Kat, MEd, MSW, CRISTIANE)   Occupational History    Not on file   Tobacco Use    Smoking status: Never    Smokeless tobacco: Never   Vaping Use    Vaping status: Never Used   Substance and Sexual Activity    Alcohol use: No    Drug use: No    Sexual activity: Yes     Partners: Male     Birth control/protection: Female Sterilization     Comment: 3 boys   Other Topics Concern    Not on file   Social History Narrative    Not on file      Social Drivers of Health     Financial Resource Strain: Low Risk  (2/25/2025)    Overall Financial Resource Strain (CARDIA)     Difficulty of Paying Living Expenses: Not hard at all   Food Insecurity: No Food Insecurity (2/25/2025)    Hunger Vital Sign     Worried About Running Out of Food in the Last Year: Never true     Ran Out of Food in the Last Year: Never true   Transportation Needs: No Transportation Needs (2/25/2025)    PRAPARE - Transportation     Lack of Transportation (Medical): No     Lack of Transportation (Non-Medical): No   Physical Activity: Insufficiently Active (2/25/2025)    Exercise Vital Sign     Days of Exercise per Week: 3 days     Minutes of Exercise per Session: 10 min   Stress: Stress Concern Present (2/25/2025)    [employer] [place] [employer] - Occupational Stress Questionnaire     Feeling of Stress : To some extent   Social Connections: Socially Integrated (2/25/2025)    Social Connection and Isolation Panel [NHANES]     Frequency of Communication with Friends and Family: More than three times a week     Frequency of Social Gatherings with Friends and Family: Twice a week     Attends Taoism Services: 1 to 4 times per year     Active Member of Clubs or Organizations: Yes     Attends Club or Organization Meetings: More than 4 times per year     Marital Status:    Intimate Partner Violence: Not on file   Housing Stability: Low Risk  (2/25/2025)    Housing Stability Vital Sign     Unable to Pay for Housing in the Last Year: No     Number of Times Moved in the Last Year: 0     Homeless in the Last Year: No       Patient Active Problem List    Diagnosis Date Noted    Female hypogonadism syndrome 07/20/2022    Vitamin B12 deficiency (non anemic) 07/19/2022    Vitamin D deficiency 07/19/2022    Hypokalemia 01/17/2022    Pure hypercholesterolemia 01/20/2020    Obesity (BMI 30-39.9) 08/09/2017    Hypertension 01/23/2015         Current Outpatient Medications   Medication  Sig Dispense Refill    ciprofloxacin (CIPRO) 500 MG Tab Take 1 Tablet by mouth every 12 hours for 7 days. 14 Tablet 0    clindamycin 1 % Gel APPLY A THIN LAYER TO THE AFFECTED AREA(S) BY TOPICAL ROUTE 2 TIMES PER DAY 60 g 2    ibuprofen (MOTRIN) 200 MG Tab Take 600 mg by mouth every 6 hours as needed.      omeprazole (PRILOSEC) 20 MG delayed-release capsule Take 1 Capsule by mouth every day. 90 Capsule 3    triamterene-hctz (MAXZIDE-25/DYAZIDE) 37.5-25 MG Tab Take 1 Tablet by mouth every morning. 90 Tablet 3    potassium chloride SA (KDUR) 20 MEQ Tab CR Take 1 Tablet by mouth every day. 90 Tablet 3    Chlorpheniramine Maleate (ALLERGY PO) Take 1 Tablet by mouth every day.       No current facility-administered medications for this visit.     Allergies   Allergen Reactions    Augmentin Rash     rash       Review of Systems   Constitutional: Negative for fever, chills  HENT: Negative for congestion.    Eyes: Negative for pain.    Respiratory: Negative for shortness of breath  Cardiovascular: Negative for leg swelling.   Gastrointestinal: Negative for abdominal pain  Genitourinary: Negative for hematuria.   Skin: Negative for rash.   Neurological: Negative for dizziness.   Endo/Heme/Allergies: Does not bleed easily.   Psychiatric/Behavioral: Negative for depression.  The patient is not nervous/anxious.      Objective:     /74 (BP Location: Left arm, Patient Position: Sitting, BP Cuff Size: Adult)   Pulse 69   Temp 36.8 °C (98.2 °F) (Temporal)   Wt 76.5 kg (168 lb 9.6 oz)   LMP 01/01/2019 (Approximate)   SpO2 98%   BMI 30.64 kg/m²   Body mass index is 30.64 kg/m².  Wt Readings from Last 4 Encounters:   02/28/25 76.5 kg (168 lb 9.6 oz)   02/26/25 76.2 kg (168 lb)   09/04/24 78.1 kg (172 lb 3.2 oz)   08/30/24 76.2 kg (168 lb)     Physical Exam:  Constitutional: Well-developed and well-nourished. Not diaphoretic. No distress.   Skin: Skin is warm and dry. No rash noted.  Head: Atraumatic without lesions.  Eyes:  Conjunctivae and extraocular motions are normal. Pupils are equal, round, and reactive to light. No scleral icterus.   Ears:  External ears unremarkable. Tympanic membranes clear and intact.  Mouth/Throat: Dentition is good. Tongue normal. Oropharynx is clear and moist. Posterior pharynx without erythema or exudates.  Neck: Supple, trachea midline. Normal range of motion. No thyromegaly present. No lymphadenopathy--cervical or supraclavicular.  Cardiovascular: Regular rate and rhythm, S1 and S2 without murmur, rubs, or gallops.  Lungs: Normal inspiratory effort, CTA bilaterally, no wheezes/rhonchi/rales  Abdomen: Soft, non tender, and without distention. Active bowel sounds in all four quadrants. No rebound, guarding, masses or HSM.  Extremities: No cyanosis, clubbing, erythema, nor edema. Distal pulses intact and symmetric.   Musculoskeletal: All major joints AROM full in all directions without pain.  Neurological: Alert and oriented x 3. DTRs 2+/3 and symmetric. No cranial nerve deficit. 5/5 myotomes. Sensation intact.   Psychiatric:  Behavior, mood, and affect are appropriate.    Assessment and Plan:     1. Wellness examination  Comp Metabolic Panel    Lipid Profile      2. Pure hypercholesterolemia  Comp Metabolic Panel    Lipid Profile            HCM:  Up to date   Labs per orders  Immunizations per orders  Patient counseled about skin care, diet, supplements, prenatal vitamins, safe sex and exercise.    Follow-up: Return in about 1 year (around 2/28/2026) for Annual/wellness visit.

## 2025-02-28 NOTE — LETTER
[facility]  Marcelina Richard M.D.  [address]  [place] [address]  Fax: [phone]   Authorization for Release/Disclosure of   Protected Health Information   Name: HALLE HUBER : 1980 SSN: [id number]   Address: [address] [place] [address] [place] [address] Phone:    [phone] (home) [phone] (work)   I authorize the entity listed below to release/disclose the PHI below to:   [facility]/Marcelina Richard M.D. and Marcelina Richard M.D.   Provider or Entity Name:  [facility]   Address   City, State, [facility]   Phone:      Fax:     Reason for request: continuity of care   Information to be released:    [XX] LAST COLONOSCOPY,  including any PATH REPORT and follow-up  [  ] LAST FIT/COLOGUARD RESULT [  ] LAST DEXA  [  ] LAST MAMMOGRAM  [  ] LAST PAP  [  ] LAST LABS [  ] RETINA EXAM REPORT  [  ] IMMUNIZATION RECORDS  [  ] Release all info      [  ] Check here and initial the line next to each item to release ALL health information INCLUDING  _____ Care and treatment for drug and / or alcohol abuse  _____ HIV testing, infection status, or AIDS  _____ Genetic Testing    DATES OF SERVICE OR TIME PERIOD TO BE DISCLOSED: _____________  I understand and acknowledge that:  * This Authorization may be revoked at any time by you in writing, except if your health information has already been used or disclosed.  * Your health information that will be used or disclosed as a result of you signing this authorization could be re-disclosed by the recipient. If this occurs, your re-disclosed health information may no longer be protected by State or Federal laws.  * You may refuse to sign this Authorization. Your refusal will not affect your ability to obtain treatment.  * This Authorization becomes effective upon signing and will  on (date) __________.      If no date is indicated, this Authorization will  one (1) year from the signature date.    Name: Halle Vu  Ron  Signature: Date:   2/28/2025     PLEASE FAX REQUESTED RECORDS BACK TO: [phone]

## 2025-03-01 ENCOUNTER — RESULTS FOLLOW-UP (OUTPATIENT)
Dept: URGENT CARE | Facility: CLINIC | Age: 45
End: 2025-03-01

## 2025-03-01 LAB
BACTERIA UR CULT: NORMAL
SIGNIFICANT IND 70042: NORMAL
SITE SITE: NORMAL
SOURCE SOURCE: NORMAL

## 2025-03-27 PROCEDURE — RXMED WILLOW AMBULATORY MEDICATION CHARGE: Performed by: FAMILY MEDICINE

## 2025-03-28 ENCOUNTER — PHARMACY VISIT (OUTPATIENT)
Dept: PHARMACY | Facility: MEDICAL CENTER | Age: 45
End: 2025-03-28
Payer: COMMERCIAL

## 2025-04-09 ENCOUNTER — APPOINTMENT (OUTPATIENT)
Dept: LAB | Facility: MEDICAL CENTER | Age: 45
End: 2025-04-09
Attending: FAMILY MEDICINE
Payer: COMMERCIAL

## 2025-04-30 PROCEDURE — RXMED WILLOW AMBULATORY MEDICATION CHARGE: Performed by: FAMILY MEDICINE

## 2025-05-06 ENCOUNTER — PHARMACY VISIT (OUTPATIENT)
Dept: PHARMACY | Facility: MEDICAL CENTER | Age: 45
End: 2025-05-06
Payer: COMMERCIAL

## 2025-05-16 ENCOUNTER — APPOINTMENT (OUTPATIENT)
Dept: LAB | Facility: MEDICAL CENTER | Age: 45
End: 2025-05-16
Payer: COMMERCIAL

## 2025-06-19 ENCOUNTER — RESULTS FOLLOW-UP (OUTPATIENT)
Dept: MEDICAL GROUP | Facility: PHYSICIAN GROUP | Age: 45
End: 2025-06-19

## 2025-06-19 ENCOUNTER — APPOINTMENT (OUTPATIENT)
Dept: LAB | Facility: MEDICAL CENTER | Age: 45
End: 2025-06-19
Payer: COMMERCIAL

## 2025-06-19 DIAGNOSIS — E78.00 PURE HYPERCHOLESTEROLEMIA: ICD-10-CM

## 2025-06-19 DIAGNOSIS — Z00.00 WELLNESS EXAMINATION: ICD-10-CM

## 2025-06-19 LAB
ALBUMIN SERPL BCP-MCNC: 4.1 G/DL (ref 3.2–4.9)
ALBUMIN/GLOB SERPL: 1.3 G/DL
ALP SERPL-CCNC: 54 U/L (ref 30–99)
ALT SERPL-CCNC: 10 U/L (ref 2–50)
ANION GAP SERPL CALC-SCNC: 12 MMOL/L (ref 7–16)
AST SERPL-CCNC: 16 U/L (ref 12–45)
BILIRUB SERPL-MCNC: 0.4 MG/DL (ref 0.1–1.5)
BUN SERPL-MCNC: 9 MG/DL (ref 8–22)
CALCIUM ALBUM COR SERPL-MCNC: 9.3 MG/DL (ref 8.5–10.5)
CALCIUM SERPL-MCNC: 9.4 MG/DL (ref 8.5–10.5)
CHLORIDE SERPL-SCNC: 100 MMOL/L (ref 96–112)
CHOLEST SERPL-MCNC: 186 MG/DL (ref 100–199)
CO2 SERPL-SCNC: 25 MMOL/L (ref 20–33)
CREAT SERPL-MCNC: 0.95 MG/DL (ref 0.5–1.4)
FASTING STATUS PATIENT QL REPORTED: NORMAL
GFR SERPLBLD CREATININE-BSD FMLA CKD-EPI: 75 ML/MIN/1.73 M 2
GLOBULIN SER CALC-MCNC: 3.2 G/DL (ref 1.9–3.5)
GLUCOSE SERPL-MCNC: 86 MG/DL (ref 65–99)
HDLC SERPL-MCNC: 40 MG/DL
LDLC SERPL CALC-MCNC: 118 MG/DL
POTASSIUM SERPL-SCNC: 3.2 MMOL/L (ref 3.6–5.5)
PROT SERPL-MCNC: 7.3 G/DL (ref 6–8.2)
SODIUM SERPL-SCNC: 137 MMOL/L (ref 135–145)
TRIGL SERPL-MCNC: 141 MG/DL (ref 0–149)

## 2025-06-19 PROCEDURE — 36415 COLL VENOUS BLD VENIPUNCTURE: CPT

## 2025-06-19 PROCEDURE — 80053 COMPREHEN METABOLIC PANEL: CPT

## 2025-06-19 PROCEDURE — 80061 LIPID PANEL: CPT

## 2025-06-25 PROCEDURE — RXMED WILLOW AMBULATORY MEDICATION CHARGE: Performed by: FAMILY MEDICINE

## 2025-06-25 RX ORDER — TRIAMTERENE AND HYDROCHLOROTHIAZIDE 37.5; 25 MG/1; MG/1
1 TABLET ORAL EVERY MORNING
Qty: 90 TABLET | Refills: 1 | Status: SHIPPED | OUTPATIENT
Start: 2025-06-25

## 2025-06-25 RX ORDER — POTASSIUM CHLORIDE 1500 MG/1
40 TABLET, EXTENDED RELEASE ORAL DAILY
Qty: 180 TABLET | Refills: 1 | Status: SHIPPED | OUTPATIENT
Start: 2025-06-25

## 2025-06-25 NOTE — TELEPHONE ENCOUNTER
Received request via: Patient    Was the patient seen in the last year in this department? Yes    Does the patient have an active prescription (recently filled or refills available) for medication(s) requested? No    Pharmacy Name: [facility] [place]      Does the patient have long term Plus and need 100-day supply? (This applies to ALL medications) Patient does not have SCP

## 2025-06-26 ENCOUNTER — PHARMACY VISIT (OUTPATIENT)
Dept: PHARMACY | Facility: MEDICAL CENTER | Age: 45
End: 2025-06-26
Payer: COMMERCIAL

## 2025-07-23 ENCOUNTER — APPOINTMENT (OUTPATIENT)
Dept: URGENT CARE | Facility: CLINIC | Age: 45
End: 2025-07-23
Payer: COMMERCIAL

## 2025-08-05 PROCEDURE — RXMED WILLOW AMBULATORY MEDICATION CHARGE: Performed by: FAMILY MEDICINE

## 2025-08-05 RX ORDER — OMEPRAZOLE 20 MG/1
20 CAPSULE, DELAYED RELEASE ORAL
Qty: 90 CAPSULE | Refills: 3 | Status: SHIPPED | OUTPATIENT
Start: 2025-08-05

## 2025-08-06 ENCOUNTER — PHARMACY VISIT (OUTPATIENT)
Dept: PHARMACY | Facility: MEDICAL CENTER | Age: 45
End: 2025-08-06
Payer: COMMERCIAL

## (undated) DEVICE — ELECTRODE 850 FOAM ADHESIVE - HYDROGEL RADIOTRNSPRNT (50/PK)

## (undated) DEVICE — GLOVE BIOGEL PI INDICATOR SZ 7.0 SURGICAL PF LF - (50/BX 4BX/CA)

## (undated) DEVICE — NEPTUNE 4 PORT MANIFOLD - (20/PK)

## (undated) DEVICE — SODIUM CHL IRRIGATION 0.9% 1000ML (12EA/CA)

## (undated) DEVICE — TUBE E-T HI-LO CUFF 7.0MM (10EA/PK)

## (undated) DEVICE — BANDAGE ELASTIC LATEX STERILE VELCRO 4 X 5 YDS (25EA/CA)

## (undated) DEVICE — Device

## (undated) DEVICE — SET LEADWIRE 5 LEAD BEDSIDE DISPOSABLE ECG (1SET OF 5/EA)

## (undated) DEVICE — TUBING SETDISPOS HIGH FLOW II - (10/BX)

## (undated) DEVICE — CANISTER SUCTION RIGID RED 1500CC (40EA/CA)

## (undated) DEVICE — DRESSING TRANSPARENT FILM TEGADERM 2.375 X 2.75"  (100EA/BX)"

## (undated) DEVICE — LACTATED RINGERS INJ 1000 ML - (14EA/CA 60CA/PF)

## (undated) DEVICE — TOWEL STOP TIMEOUT SAFETY FLAG (40EA/CA)

## (undated) DEVICE — GOWN WARMING STANDARD FLEX - (30/CA)

## (undated) DEVICE — SET SUCTION/IRRIGATION WITH DISPOSABLE TIP (6/CA )PART #0250-070-520 IS A SUB

## (undated) DEVICE — NEEDLE INSFL 120MM 14GA VRRS - (20/BX)

## (undated) DEVICE — ARMREST CRADLE FOAM - (2PR/PK 12PR/CA)

## (undated) DEVICE — DRAPE VAGINAL BIB W/ POUCH (10EA/CA)

## (undated) DEVICE — GLOVE BIOGEL SZ 6.5 SURGICAL PF LTX (50PR/BX 4BX/CA)

## (undated) DEVICE — CATHETER IV 20 GA X 1-1/4 ---SURG.& SDS ONLY--- (50EA/BX)

## (undated) DEVICE — PAD BABY LAP 4X18 W/O - RINGS PREWASHED 5/PK 40PK/CS

## (undated) DEVICE — SUTURE 0 VICRYL PLUS CT-2 - 27 INCH (36/BX)

## (undated) DEVICE — SET EXTENSION WITH 2 PORTS (48EA/CA) ***PART #2C8610 IS A SUBSTITUTE*****

## (undated) DEVICE — PACK MINOR BASIN - (2EA/CA)

## (undated) DEVICE — SUTURE GENERAL

## (undated) DEVICE — TRAY SRGPRP PVP IOD WT PRP - (20/CA)

## (undated) DEVICE — TROCAR 5X100 BLADED ADVANCE - FIXATION (6/BX)

## (undated) DEVICE — WATER IRRIGATION STERILE 1000ML (12EA/CA)

## (undated) DEVICE — CANNULA O2 COMFORT SOFT EAR ADULT 7 FT TUBING (50/CA)

## (undated) DEVICE — SUCTION INSTRUMENT YANKAUER BULBOUS TIP W/O VENT (50EA/CA)

## (undated) DEVICE — BLADE SURGICAL #10 - (50/BX)

## (undated) DEVICE — KIT ANESTHESIA W/CIRCUIT & 3/LT BAG W/FILTER (20EA/CA)

## (undated) DEVICE — LIGASURE 5MM BLUNT TIP LONG - 44CM (6EA/PK)

## (undated) DEVICE — BLADE SURGICAL CLIPPER - (50EA/CA)

## (undated) DEVICE — PAD SANITARY 11IN MAXI IND WRAPPED  (12EA/PK 24PK/CA)

## (undated) DEVICE — CANISTER SUCTION 3000ML MECHANICAL FILTER AUTO SHUTOFF MEDI-VAC NONSTERILE LF DISP  (40EA/CA)

## (undated) DEVICE — BANDAGE ROLL STERILE BULKEE 4.5 IN X 4 YD (100EA/CA)

## (undated) DEVICE — ELECTRODE DUAL RETURN W/ CORD - (50/PK)

## (undated) DEVICE — TUBE CONNECTING SUCTION - CLEAR PLASTIC STERILE 72 IN (50EA/CA)

## (undated) DEVICE — HEAD HOLDER JUNIOR/ADULT

## (undated) DEVICE — GLOVE LITE HANDLE DISP. - (1/PK 80PK/CS)

## (undated) DEVICE — SENSOR SPO2 NEO LNCS ADHESIVE (20/BX) SEE USER NOTES

## (undated) DEVICE — APPICATOR HEMOSTAT ARISTA XL - FLEXITIP (10EA/CA)

## (undated) DEVICE — GLOVE SZ 6.5 BIOGEL PI MICRO - PF LF (50PR/BX)

## (undated) DEVICE — CHLORAPREP 26 ML APPLICATOR - ORANGE TINT(25/CA)

## (undated) DEVICE — SUTURE 2-0 VICRYL PLUS CT-2 - 27 INCH (36/BX)

## (undated) DEVICE — SYSTEM CLEARIFY VISUALIZATION (10EA/PK)

## (undated) DEVICE — TUBING CLEARLINK DUO-VENT - C-FLO (48EA/CA)

## (undated) DEVICE — TRAY FOLEY CATHETER STATLOCK 16FR SURESTEP  (10EA/CA)

## (undated) DEVICE — SUTURE 0 VICRYL PLUS UR-6 - 27 INCH (36/BX)

## (undated) DEVICE — SUTURE 4-0 PROLENE PS-2 18 (36PK/BX)"

## (undated) DEVICE — WATER IRRIG. STER 3000 ML - (4/CA)

## (undated) DEVICE — TOURNIQUET CUFF 18 X 3 ONE PORT DISP - STERILE (10/BX)

## (undated) DEVICE — MASK OXYGEN VNYL ADLT MED CONC WITH 7 FOOT TUBING  - (50EA/CA)

## (undated) DEVICE — SLEEVE, VASO, THIGH, MED

## (undated) DEVICE — SYRINGE 3 CC 21 GA X 1-1/2 - NDL SAFETY (50/BX 8BX/CA)

## (undated) DEVICE — KIT  I.V. START (100EA/CA)

## (undated) DEVICE — SUTURE 0 COATED VICRYL 6-18IN - (12PK/BX)

## (undated) DEVICE — SUTURE 0 VICRYL PLUS CT-1 - 36 INCH (36/BX)

## (undated) DEVICE — SENSOR OXIMETER ADULT SPO2 RD SET (20EA/BX)

## (undated) DEVICE — PAD PREP 24 X 48 CUFFED - (100/CA)

## (undated) DEVICE — SET IRRIGATION CYSTOSCOPY TUBE L80 IN (20EA/CA)

## (undated) DEVICE — PROTECTOR ULNA NERVE - (36PR/CA)

## (undated) DEVICE — GLOVE SZ 7 BIOGEL PI MICRO - PF LF (50PR/BX 4BX/CA)

## (undated) DEVICE — PACK LAPAROSCOPY - (1/CA)

## (undated) DEVICE — TROCAR Z THREAD 11 X 100 - BLADED (6/BX)

## (undated) DEVICE — MASK ANESTHESIA ADULT  - (100/CA)

## (undated) DEVICE — SLEEVE VASO CALF MED - (10PR/CA)

## (undated) DEVICE — UTERINE MANIP RUMI 6.7X8 - (5/BX)

## (undated) DEVICE — DRAIN PENROSE STERILE 1/4 X - 18 IN  (25EA/BX)

## (undated) DEVICE — PACK UPPER EXTREMITY (2EA/CA)

## (undated) DEVICE — HEMOSTAT ARISTA PWD 5 GRAM - (5/BX)

## (undated) DEVICE — COVER LIGHT HANDLE FLEXIBLE - SOFT (2EA/PK 80PK/CA)

## (undated) DEVICE — SUTURE 4-0 VICRYL PLUSFS-1 - 27 INCH (36/BX)

## (undated) DEVICE — SPONGE GAUZESTER. 2X2 4-PL - (2/PK 50PK/BX 30BX/CS)